# Patient Record
Sex: FEMALE | Race: WHITE | NOT HISPANIC OR LATINO | URBAN - METROPOLITAN AREA
[De-identification: names, ages, dates, MRNs, and addresses within clinical notes are randomized per-mention and may not be internally consistent; named-entity substitution may affect disease eponyms.]

---

## 2017-02-07 VITALS
RESPIRATION RATE: 16 BRPM | DIASTOLIC BLOOD PRESSURE: 62 MMHG | RESPIRATION RATE: 12 BRPM | HEART RATE: 79 BPM | DIASTOLIC BLOOD PRESSURE: 76 MMHG | RESPIRATION RATE: 21 BRPM | DIASTOLIC BLOOD PRESSURE: 81 MMHG | HEART RATE: 83 BPM | TEMPERATURE: 97.1 F | SYSTOLIC BLOOD PRESSURE: 134 MMHG | HEART RATE: 64 BPM | HEIGHT: 64 IN | DIASTOLIC BLOOD PRESSURE: 68 MMHG | RESPIRATION RATE: 22 BRPM | SYSTOLIC BLOOD PRESSURE: 124 MMHG | OXYGEN SATURATION: 97 % | OXYGEN SATURATION: 99 % | TEMPERATURE: 97.5 F | WEIGHT: 121 LBS | DIASTOLIC BLOOD PRESSURE: 63 MMHG | HEART RATE: 99 BPM | RESPIRATION RATE: 18 BRPM | DIASTOLIC BLOOD PRESSURE: 74 MMHG | SYSTOLIC BLOOD PRESSURE: 122 MMHG | HEART RATE: 68 BPM | HEART RATE: 65 BPM | SYSTOLIC BLOOD PRESSURE: 132 MMHG | SYSTOLIC BLOOD PRESSURE: 123 MMHG | HEART RATE: 69 BPM | HEART RATE: 66 BPM | SYSTOLIC BLOOD PRESSURE: 125 MMHG | RESPIRATION RATE: 20 BRPM | OXYGEN SATURATION: 100 %

## 2017-02-08 ENCOUNTER — OFFICE (AMBULATORY)
Dept: URBAN - METROPOLITAN AREA CLINIC 64 | Facility: CLINIC | Age: 82
End: 2017-02-08
Payer: MEDICARE

## 2017-02-08 ENCOUNTER — AMBULATORY SURGICAL CENTER (AMBULATORY)
Dept: URBAN - METROPOLITAN AREA SURGERY 17 | Facility: SURGERY | Age: 82
End: 2017-02-08
Payer: MEDICARE

## 2017-02-08 DIAGNOSIS — R13.10 DYSPHAGIA, UNSPECIFIED: ICD-10-CM

## 2017-02-08 DIAGNOSIS — K29.70 GASTRITIS, UNSPECIFIED, WITHOUT BLEEDING: ICD-10-CM

## 2017-02-08 DIAGNOSIS — K21.9 GASTRO-ESOPHAGEAL REFLUX DISEASE WITHOUT ESOPHAGITIS: ICD-10-CM

## 2017-02-08 DIAGNOSIS — K44.9 DIAPHRAGMATIC HERNIA WITHOUT OBSTRUCTION OR GANGRENE: ICD-10-CM

## 2017-02-08 LAB
GI HISTOLOGY: A. UNSPECIFIED: (no result)
GI HISTOLOGY: B. SELECT: (no result)
GI HISTOLOGY: C. UNSPECIFIED: (no result)
GI HISTOLOGY: D. UNSPECIFIED: (no result)
GI HISTOLOGY: PDF REPORT: (no result)

## 2017-02-08 PROCEDURE — 88305 TISSUE EXAM BY PATHOLOGIST: CPT | Performed by: INTERNAL MEDICINE

## 2017-02-08 PROCEDURE — 43239 EGD BIOPSY SINGLE/MULTIPLE: CPT | Performed by: INTERNAL MEDICINE

## 2017-02-08 RX ADMIN — PROPOFOL 50 MG: 10 INJECTION, EMULSION INTRAVENOUS at 10:15

## 2017-02-08 RX ADMIN — PROPOFOL 50 MG: 10 INJECTION, EMULSION INTRAVENOUS at 10:21

## 2017-02-08 RX ADMIN — LIDOCAINE HYDROCHLORIDE 50 MG: 10 INJECTION, SOLUTION EPIDURAL; INFILTRATION; INTRACAUDAL; PERINEURAL at 10:14

## 2017-02-08 RX ADMIN — PROPOFOL 50 MG: 10 INJECTION, EMULSION INTRAVENOUS at 10:19

## 2017-06-21 ENCOUNTER — TELEPHONE (OUTPATIENT)
Dept: CARDIOLOGY | Facility: CLINIC | Age: 82
End: 2017-06-21

## 2017-06-22 NOTE — TELEPHONE ENCOUNTER
I actually called and spoke with her.  The pain is exclusively with turning her head and positional changes. She has had no chest pain, SOA, or syncope.  She exerts herself without any issues.  I told her this is very likely musculoskeletal and not cardiac.  She will call back with any issues.  Thanks     JAYDEN

## 2017-10-04 ENCOUNTER — HOSPITAL ENCOUNTER (OUTPATIENT)
Facility: HOSPITAL | Age: 82
Setting detail: HOSPITAL OUTPATIENT SURGERY
End: 2017-10-04
Attending: PLASTIC SURGERY | Admitting: PLASTIC SURGERY

## 2017-10-18 ENCOUNTER — APPOINTMENT (OUTPATIENT)
Dept: PREADMISSION TESTING | Facility: HOSPITAL | Age: 82
End: 2017-10-18

## 2017-11-14 ENCOUNTER — APPOINTMENT (OUTPATIENT)
Dept: PREADMISSION TESTING | Facility: HOSPITAL | Age: 82
End: 2017-11-14

## 2017-11-14 VITALS
SYSTOLIC BLOOD PRESSURE: 121 MMHG | HEART RATE: 62 BPM | DIASTOLIC BLOOD PRESSURE: 64 MMHG | HEIGHT: 64 IN | RESPIRATION RATE: 16 BRPM | TEMPERATURE: 97.9 F | WEIGHT: 118 LBS | OXYGEN SATURATION: 96 % | BODY MASS INDEX: 20.14 KG/M2

## 2017-11-14 LAB
ANION GAP SERPL CALCULATED.3IONS-SCNC: 13.8 MMOL/L
BUN BLD-MCNC: 11 MG/DL (ref 8–23)
BUN/CREAT SERPL: 14.3 (ref 7–25)
CALCIUM SPEC-SCNC: 9.2 MG/DL (ref 8.6–10.5)
CHLORIDE SERPL-SCNC: 103 MMOL/L (ref 98–107)
CO2 SERPL-SCNC: 25.2 MMOL/L (ref 22–29)
CREAT BLD-MCNC: 0.77 MG/DL (ref 0.57–1)
DEPRECATED RDW RBC AUTO: 43.9 FL (ref 37–54)
ERYTHROCYTE [DISTWIDTH] IN BLOOD BY AUTOMATED COUNT: 13.3 % (ref 11.7–13)
GFR SERPL CREATININE-BSD FRML MDRD: 72 ML/MIN/1.73
GLUCOSE BLD-MCNC: 97 MG/DL (ref 65–99)
HCT VFR BLD AUTO: 37.1 % (ref 35.6–45.5)
HGB BLD-MCNC: 12.3 G/DL (ref 11.9–15.5)
MCH RBC QN AUTO: 30.1 PG (ref 26.9–32)
MCHC RBC AUTO-ENTMCNC: 33.2 G/DL (ref 32.4–36.3)
MCV RBC AUTO: 90.9 FL (ref 80.5–98.2)
PLATELET # BLD AUTO: 231 10*3/MM3 (ref 140–500)
PMV BLD AUTO: 8.4 FL (ref 6–12)
POTASSIUM BLD-SCNC: 3.3 MMOL/L (ref 3.5–5.2)
RBC # BLD AUTO: 4.08 10*6/MM3 (ref 3.9–5.2)
SODIUM BLD-SCNC: 142 MMOL/L (ref 136–145)
WBC NRBC COR # BLD: 6 10*3/MM3 (ref 4.5–10.7)

## 2017-11-14 PROCEDURE — 85027 COMPLETE CBC AUTOMATED: CPT | Performed by: PLASTIC SURGERY

## 2017-11-14 PROCEDURE — 36415 COLL VENOUS BLD VENIPUNCTURE: CPT

## 2017-11-14 PROCEDURE — 93010 ELECTROCARDIOGRAM REPORT: CPT | Performed by: INTERNAL MEDICINE

## 2017-11-14 PROCEDURE — 80048 BASIC METABOLIC PNL TOTAL CA: CPT | Performed by: PLASTIC SURGERY

## 2017-11-14 PROCEDURE — 93005 ELECTROCARDIOGRAM TRACING: CPT

## 2017-11-14 RX ORDER — FLUTICASONE PROPIONATE 50 MCG
2 SPRAY, SUSPENSION (ML) NASAL DAILY PRN
Status: ON HOLD | COMMUNITY
Start: 2017-11-02 | End: 2018-10-30

## 2017-11-28 ENCOUNTER — ANESTHESIA EVENT (OUTPATIENT)
Dept: PERIOP | Facility: HOSPITAL | Age: 82
End: 2017-11-28

## 2017-11-28 ENCOUNTER — ANESTHESIA (OUTPATIENT)
Dept: PERIOP | Facility: HOSPITAL | Age: 82
End: 2017-11-28

## 2017-11-28 ENCOUNTER — HOSPITAL ENCOUNTER (OUTPATIENT)
Facility: HOSPITAL | Age: 82
Setting detail: HOSPITAL OUTPATIENT SURGERY
Discharge: HOME | End: 2017-11-28
Attending: PLASTIC SURGERY | Admitting: PLASTIC SURGERY

## 2017-11-28 VITALS
DIASTOLIC BLOOD PRESSURE: 97 MMHG | RESPIRATION RATE: 16 BRPM | HEART RATE: 63 BPM | SYSTOLIC BLOOD PRESSURE: 120 MMHG | OXYGEN SATURATION: 100 % | TEMPERATURE: 98.1 F

## 2017-11-28 DIAGNOSIS — L98.9 SKIN LESION: ICD-10-CM

## 2017-11-28 PROCEDURE — 25010000002 PHENYLEPHRINE PER 1 ML: Performed by: NURSE ANESTHETIST, CERTIFIED REGISTERED

## 2017-11-28 PROCEDURE — 25010000002 PROPOFOL 10 MG/ML EMULSION: Performed by: NURSE ANESTHETIST, CERTIFIED REGISTERED

## 2017-11-28 PROCEDURE — 88305 TISSUE EXAM BY PATHOLOGIST: CPT | Performed by: PLASTIC SURGERY

## 2017-11-28 PROCEDURE — 25010000002 MIDAZOLAM PER 1 MG: Performed by: ANESTHESIOLOGY

## 2017-11-28 PROCEDURE — 25010000002 ONDANSETRON PER 1 MG: Performed by: ANESTHESIOLOGY

## 2017-11-28 PROCEDURE — 25010000002 FENTANYL CITRATE (PF) 100 MCG/2ML SOLUTION: Performed by: NURSE ANESTHETIST, CERTIFIED REGISTERED

## 2017-11-28 PROCEDURE — 88331 PATH CONSLTJ SURG 1 BLK 1SPC: CPT | Performed by: PLASTIC SURGERY

## 2017-11-28 RX ORDER — ONDANSETRON 2 MG/ML
INJECTION INTRAMUSCULAR; INTRAVENOUS AS NEEDED
Status: DISCONTINUED | OUTPATIENT
Start: 2017-11-28 | End: 2017-11-28 | Stop reason: SURG

## 2017-11-28 RX ORDER — SODIUM CHLORIDE 0.9 % (FLUSH) 0.9 %
1-10 SYRINGE (ML) INJECTION AS NEEDED
Status: DISCONTINUED | OUTPATIENT
Start: 2017-11-28 | End: 2017-11-28 | Stop reason: HOSPADM

## 2017-11-28 RX ORDER — BACITRACIN ZINC 500 [USP'U]/G
OINTMENT TOPICAL AS NEEDED
Status: DISCONTINUED | OUTPATIENT
Start: 2017-11-28 | End: 2017-11-28 | Stop reason: HOSPADM

## 2017-11-28 RX ORDER — PROPOFOL 10 MG/ML
VIAL (ML) INTRAVENOUS AS NEEDED
Status: DISCONTINUED | OUTPATIENT
Start: 2017-11-28 | End: 2017-11-28 | Stop reason: SURG

## 2017-11-28 RX ORDER — LIDOCAINE HYDROCHLORIDE 20 MG/ML
INJECTION, SOLUTION INFILTRATION; PERINEURAL AS NEEDED
Status: DISCONTINUED | OUTPATIENT
Start: 2017-11-28 | End: 2017-11-28 | Stop reason: SURG

## 2017-11-28 RX ORDER — OXYCODONE HYDROCHLORIDE AND ACETAMINOPHEN 5; 325 MG/1; MG/1
1 TABLET ORAL ONCE AS NEEDED
Status: CANCELLED | OUTPATIENT
Start: 2017-11-28 | End: 2017-12-08

## 2017-11-28 RX ORDER — HYDROCODONE BITARTRATE AND ACETAMINOPHEN 5; 325 MG/1; MG/1
1 TABLET ORAL EVERY 4 HOURS PRN
Qty: 12 TABLET | Refills: 0 | Status: ON HOLD | OUTPATIENT
Start: 2017-11-28 | End: 2018-10-30

## 2017-11-28 RX ORDER — NALBUPHINE HCL 10 MG/ML
2 AMPUL (ML) INJECTION EVERY 4 HOURS PRN
Status: CANCELLED | OUTPATIENT
Start: 2017-11-28

## 2017-11-28 RX ORDER — LIDOCAINE HYDROCHLORIDE 10 MG/ML
0.5 INJECTION, SOLUTION EPIDURAL; INFILTRATION; INTRACAUDAL; PERINEURAL ONCE AS NEEDED
Status: DISCONTINUED | OUTPATIENT
Start: 2017-11-28 | End: 2017-11-28 | Stop reason: HOSPADM

## 2017-11-28 RX ORDER — PROMETHAZINE HYDROCHLORIDE 25 MG/ML
6.25 INJECTION, SOLUTION INTRAMUSCULAR; INTRAVENOUS ONCE AS NEEDED
Status: CANCELLED | OUTPATIENT
Start: 2017-11-28

## 2017-11-28 RX ORDER — SODIUM CHLORIDE, SODIUM LACTATE, POTASSIUM CHLORIDE, CALCIUM CHLORIDE 600; 310; 30; 20 MG/100ML; MG/100ML; MG/100ML; MG/100ML
9 INJECTION, SOLUTION INTRAVENOUS CONTINUOUS
Status: DISCONTINUED | OUTPATIENT
Start: 2017-11-28 | End: 2017-11-28 | Stop reason: HOSPADM

## 2017-11-28 RX ORDER — ACETAMINOPHEN 325 MG/1
650 TABLET ORAL ONCE
Status: COMPLETED | OUTPATIENT
Start: 2017-11-28 | End: 2017-11-28

## 2017-11-28 RX ORDER — DIPHENHYDRAMINE HYDROCHLORIDE 50 MG/ML
12.5 INJECTION INTRAMUSCULAR; INTRAVENOUS
Status: CANCELLED | OUTPATIENT
Start: 2017-11-28

## 2017-11-28 RX ORDER — MAGNESIUM HYDROXIDE 1200 MG/15ML
LIQUID ORAL AS NEEDED
Status: DISCONTINUED | OUTPATIENT
Start: 2017-11-28 | End: 2017-11-28 | Stop reason: HOSPADM

## 2017-11-28 RX ORDER — NALBUPHINE HCL 10 MG/ML
10 AMPUL (ML) INJECTION EVERY 4 HOURS PRN
Status: CANCELLED | OUTPATIENT
Start: 2017-11-28

## 2017-11-28 RX ORDER — ACETAMINOPHEN 650 MG/1
650 SUPPOSITORY RECTAL ONCE AS NEEDED
Status: CANCELLED | OUTPATIENT
Start: 2017-11-28

## 2017-11-28 RX ORDER — FAMOTIDINE 10 MG/ML
20 INJECTION, SOLUTION INTRAVENOUS ONCE
Status: COMPLETED | OUTPATIENT
Start: 2017-11-28 | End: 2017-11-28

## 2017-11-28 RX ORDER — MIDAZOLAM HYDROCHLORIDE 1 MG/ML
1 INJECTION INTRAMUSCULAR; INTRAVENOUS
Status: DISCONTINUED | OUTPATIENT
Start: 2017-11-28 | End: 2017-11-28 | Stop reason: HOSPADM

## 2017-11-28 RX ORDER — PROMETHAZINE HYDROCHLORIDE 25 MG/1
25 SUPPOSITORY RECTAL ONCE AS NEEDED
Status: CANCELLED | OUTPATIENT
Start: 2017-11-28

## 2017-11-28 RX ORDER — NALOXONE HCL 0.4 MG/ML
0.4 VIAL (ML) INJECTION AS NEEDED
Status: CANCELLED | OUTPATIENT
Start: 2017-11-28

## 2017-11-28 RX ORDER — PROMETHAZINE HYDROCHLORIDE 25 MG/1
25 TABLET ORAL ONCE AS NEEDED
Status: CANCELLED | OUTPATIENT
Start: 2017-11-28

## 2017-11-28 RX ORDER — MIDAZOLAM HYDROCHLORIDE 1 MG/ML
2 INJECTION INTRAMUSCULAR; INTRAVENOUS
Status: DISCONTINUED | OUTPATIENT
Start: 2017-11-28 | End: 2017-11-28 | Stop reason: HOSPADM

## 2017-11-28 RX ORDER — PROPOFOL 10 MG/ML
VIAL (ML) INTRAVENOUS CONTINUOUS PRN
Status: DISCONTINUED | OUTPATIENT
Start: 2017-11-28 | End: 2017-11-28 | Stop reason: SURG

## 2017-11-28 RX ORDER — FENTANYL CITRATE 50 UG/ML
50 INJECTION, SOLUTION INTRAMUSCULAR; INTRAVENOUS
Status: CANCELLED | OUTPATIENT
Start: 2017-11-28

## 2017-11-28 RX ORDER — FENTANYL CITRATE 50 UG/ML
INJECTION, SOLUTION INTRAMUSCULAR; INTRAVENOUS AS NEEDED
Status: DISCONTINUED | OUTPATIENT
Start: 2017-11-28 | End: 2017-11-28 | Stop reason: SURG

## 2017-11-28 RX ORDER — HYDROMORPHONE HYDROCHLORIDE 1 MG/ML
0.5 INJECTION, SOLUTION INTRAMUSCULAR; INTRAVENOUS; SUBCUTANEOUS
Status: CANCELLED | OUTPATIENT
Start: 2017-11-28 | End: 2017-11-29

## 2017-11-28 RX ORDER — ACETAMINOPHEN 325 MG/1
650 TABLET ORAL ONCE AS NEEDED
Status: CANCELLED | OUTPATIENT
Start: 2017-11-28

## 2017-11-28 RX ORDER — HYDRALAZINE HYDROCHLORIDE 20 MG/ML
5 INJECTION INTRAMUSCULAR; INTRAVENOUS
Status: CANCELLED | OUTPATIENT
Start: 2017-11-28

## 2017-11-28 RX ADMIN — ONDANSETRON 4 MG: 2 INJECTION INTRAMUSCULAR; INTRAVENOUS at 15:26

## 2017-11-28 RX ADMIN — ACETAMINOPHEN 650 MG: 325 TABLET ORAL at 13:31

## 2017-11-28 RX ADMIN — PROPOFOL 100 MCG/KG/MIN: 10 INJECTION, EMULSION INTRAVENOUS at 14:40

## 2017-11-28 RX ADMIN — SODIUM CHLORIDE, POTASSIUM CHLORIDE, SODIUM LACTATE AND CALCIUM CHLORIDE 9 ML/HR: 600; 310; 30; 20 INJECTION, SOLUTION INTRAVENOUS at 13:31

## 2017-11-28 RX ADMIN — PHENYLEPHRINE HYDROCHLORIDE 50 MCG: 10 INJECTION INTRAVENOUS at 15:00

## 2017-11-28 RX ADMIN — MIDAZOLAM 1 MG: 1 INJECTION INTRAMUSCULAR; INTRAVENOUS at 13:32

## 2017-11-28 RX ADMIN — LIDOCAINE HYDROCHLORIDE 60 MG: 20 INJECTION, SOLUTION INFILTRATION; PERINEURAL at 14:40

## 2017-11-28 RX ADMIN — PHENYLEPHRINE HYDROCHLORIDE 50 MCG: 10 INJECTION INTRAVENOUS at 14:56

## 2017-11-28 RX ADMIN — FAMOTIDINE 20 MG: 10 INJECTION, SOLUTION INTRAVENOUS at 13:31

## 2017-11-28 RX ADMIN — PROPOFOL 50 MG: 10 INJECTION, EMULSION INTRAVENOUS at 14:40

## 2017-11-28 RX ADMIN — FENTANYL CITRATE 25 MCG: 50 INJECTION INTRAMUSCULAR; INTRAVENOUS at 14:40

## 2017-11-28 NOTE — ANESTHESIA PREPROCEDURE EVALUATION
Anesthesia Evaluation     NPO Solid Status: > 8 hours       Airway   Mallampati: III  TM distance: >3 FB  Neck ROM: full  possible difficult intubation  Dental - normal exam     Pulmonary - negative pulmonary ROS and normal exam   Cardiovascular     ECG reviewed  Rhythm: regular    (+) valvular problems/murmurs AS, murmur,     ROS comment: Mod/severe AS.  No hx of syncope or CP    Perfusion scan wnl    Neuro/Psych- negative ROS  GI/Hepatic/Renal/Endo - negative ROS     Musculoskeletal     Abdominal  - normal exam   Substance History      OB/GYN          Other                                      Anesthesia Plan    ASA 3     MAC   (Pt understands high probability of recall.  Will need to be awake without supplemental O2 for surgical portion.    Spoke w Dr. CHRISTIANSON, she desires deeper sedation and will inform us of any cautery use. She is aware of concerns of airway fire and O2)

## 2017-11-28 NOTE — ANESTHESIA POSTPROCEDURE EVALUATION
Patient: Tequila Vargas    Procedure Summary     Date Anesthesia Start Anesthesia Stop Room / Location    11/28/17 1436 1533  [facility] /  [facility]       Procedure Diagnosis Surgeon Provider    EXCISION ACTINIC KERATOSIS NASAL TIP (N/A ) No diagnosis on file. Maurine Waterhouse, MD Edwin Ray Render, MD          Anesthesia Type: MAC  Last vitals  BP   120/97 (11/28/17 1600)   Temp   36.7 °C (98.1 °F) (11/28/17 1531)   Pulse   63 (11/28/17 1600)   Resp   16 (11/28/17 1540)     SpO2   100 % (11/28/17 1600)     Post Anesthesia Care and Evaluation    Patient location during evaluation: bedside  Patient participation: complete - patient participated  Level of consciousness: awake and alert  Pain management: adequate  Airway patency: patent  Anesthetic complications: No anesthetic complications    Cardiovascular status: acceptable  Respiratory status: acceptable  Hydration status: acceptable    Comments: /97  Pulse 63  Temp 36.7 °C (98.1 °F) (Oral)   Resp 16  SpO2 100%  Pt seen in prop room where recovering prior to discharge.   Sign out done from enodo suite where I was on a computer

## 2017-11-30 LAB
LAB AP CASE REPORT: NORMAL
LAB AP CLINICAL INFORMATION: NORMAL
Lab: NORMAL
Lab: NORMAL
PATH REPORT.FINAL DX SPEC: NORMAL
PATH REPORT.GROSS SPEC: NORMAL

## 2018-10-16 ENCOUNTER — TELEPHONE (OUTPATIENT)
Dept: CARDIOLOGY | Facility: CLINIC | Age: 83
End: 2018-10-16

## 2018-10-16 DIAGNOSIS — I35.0 NONRHEUMATIC AORTIC VALVE STENOSIS: Primary | ICD-10-CM

## 2018-10-16 DIAGNOSIS — R06.02 SHORTNESS OF BREATH: ICD-10-CM

## 2018-10-16 NOTE — TELEPHONE ENCOUNTER
10/16/18   Pt called she is having increase Soa. She states she can just be standing and feels like she can't catch her breath. She states it has been going on for about a 1 month.   She is having chest tightness/pain and no swelling and some dizziness but thinks that from her Ear problems.   She wanted to know if you want to see in office or  order testing?    Pt's call back # [phone]    Thanks Jonh ACUÑA

## 2018-10-16 NOTE — TELEPHONE ENCOUNTER
I spoke with pt and she is aware no driving and Schedulers from Office will call her with Echo time for tomorrow.   Jonh ACUÑA

## 2018-10-16 NOTE — TELEPHONE ENCOUNTER
Jonh,    Per our conversation earlier, she is scheduled for an echo tomorrow.  She should not drive for now.  I am suspicious for significant aortic stenosis.      GM

## 2018-10-17 ENCOUNTER — HOSPITAL ENCOUNTER (OUTPATIENT)
Dept: CARDIOLOGY | Facility: HOSPITAL | Age: 83
Discharge: HOME | End: 2018-10-17
Attending: INTERNAL MEDICINE | Admitting: INTERNAL MEDICINE

## 2018-10-17 VITALS
SYSTOLIC BLOOD PRESSURE: 100 MMHG | BODY MASS INDEX: 20.14 KG/M2 | DIASTOLIC BLOOD PRESSURE: 70 MMHG | HEIGHT: 64 IN | HEART RATE: 110 BPM | WEIGHT: 118 LBS

## 2018-10-17 DIAGNOSIS — I35.0 NONRHEUMATIC AORTIC VALVE STENOSIS: ICD-10-CM

## 2018-10-17 DIAGNOSIS — R06.02 SHORTNESS OF BREATH: ICD-10-CM

## 2018-10-17 LAB
AORTIC DIMENSIONLESS INDEX: 0.2 (DI)
ASCENDING AORTA: 2.9 CM
BH CV ECHO MEAS - ACS: 0.8 CM
BH CV ECHO MEAS - AO MAX PG (FULL): 35.4 MMHG
BH CV ECHO MEAS - AO MAX PG: 37 MMHG
BH CV ECHO MEAS - AO MEAN PG (FULL): 26.8 MMHG
BH CV ECHO MEAS - AO MEAN PG: 27 MMHG
BH CV ECHO MEAS - AO ROOT AREA (BSA CORRECTED): 2
BH CV ECHO MEAS - AO ROOT AREA: 7.9 CM^2
BH CV ECHO MEAS - AO ROOT DIAM: 3.2 CM
BH CV ECHO MEAS - AO V2 MAX: 302.7 CM/SEC
BH CV ECHO MEAS - AO V2 MEAN: 258.3 CM/SEC
BH CV ECHO MEAS - AO V2 VTI: 71.9 CM
BH CV ECHO MEAS - AVA(I,A): 0.51 CM^2
BH CV ECHO MEAS - AVA(I,D): 0.51 CM^2
BH CV ECHO MEAS - AVA(V,A): 0.57 CM^2
BH CV ECHO MEAS - AVA(V,D): 0.57 CM^2
BH CV ECHO MEAS - BSA(HAYCOCK): 1.6 M^2
BH CV ECHO MEAS - BSA: 1.6 M^2
BH CV ECHO MEAS - BZI_BMI: 20.3 KILOGRAMS/M^2
BH CV ECHO MEAS - BZI_METRIC_HEIGHT: 162.6 CM
BH CV ECHO MEAS - BZI_METRIC_WEIGHT: 53.5 KG
BH CV ECHO MEAS - EDV(MOD-SP2): 56 ML
BH CV ECHO MEAS - EDV(MOD-SP4): 49 ML
BH CV ECHO MEAS - EDV(TEICH): 57.6 ML
BH CV ECHO MEAS - EF(CUBED): 64.4 %
BH CV ECHO MEAS - EF(MOD-BP): 53 %
BH CV ECHO MEAS - EF(MOD-SP2): 51.8 %
BH CV ECHO MEAS - EF(MOD-SP4): 55.1 %
BH CV ECHO MEAS - EF(TEICH): 56.8 %
BH CV ECHO MEAS - ESV(MOD-SP2): 27 ML
BH CV ECHO MEAS - ESV(MOD-SP4): 22 ML
BH CV ECHO MEAS - ESV(TEICH): 24.9 ML
BH CV ECHO MEAS - FS: 29.1 %
BH CV ECHO MEAS - IVS/LVPW: 1.2
BH CV ECHO MEAS - IVSD: 1.1 CM
BH CV ECHO MEAS - LAT PEAK E' VEL: 8 CM/SEC
BH CV ECHO MEAS - LV DIASTOLIC VOL/BSA (35-75): 31.3 ML/M^2
BH CV ECHO MEAS - LV MASS(C)D: 115.2 GRAMS
BH CV ECHO MEAS - LV MASS(C)DI: 73.7 GRAMS/M^2
BH CV ECHO MEAS - LV MAX PG: 1.3 MMHG
BH CV ECHO MEAS - LV MEAN PG: 0.7 MMHG
BH CV ECHO MEAS - LV SYSTOLIC VOL/BSA (12-30): 14.1 ML/M^2
BH CV ECHO MEAS - LV V1 MAX: 56.8 CM/SEC
BH CV ECHO MEAS - LV V1 MEAN: 39.1 CM/SEC
BH CV ECHO MEAS - LV V1 VTI: 12.1 CM
BH CV ECHO MEAS - LVIDD: 3.7 CM
BH CV ECHO MEAS - LVIDS: 2.6 CM
BH CV ECHO MEAS - LVLD AP2: 6.6 CM
BH CV ECHO MEAS - LVLD AP4: 6.5 CM
BH CV ECHO MEAS - LVLS AP2: 6.3 CM
BH CV ECHO MEAS - LVLS AP4: 5.5 CM
BH CV ECHO MEAS - LVOT AREA (M): 3.1 CM^2
BH CV ECHO MEAS - LVOT AREA: 3 CM^2
BH CV ECHO MEAS - LVOT DIAM: 2 CM
BH CV ECHO MEAS - LVPWD: 0.93 CM
BH CV ECHO MEAS - MED PEAK E' VEL: 8 CM/SEC
BH CV ECHO MEAS - MR MAX PG: 100.7 MMHG
BH CV ECHO MEAS - MR MAX VEL: 501.7 CM/SEC
BH CV ECHO MEAS - MV DEC SLOPE: 670.6 CM/SEC^2
BH CV ECHO MEAS - MV DEC TIME: 0.19 SEC
BH CV ECHO MEAS - MV E MAX VEL: 126 CM/SEC
BH CV ECHO MEAS - MV MAX PG: 8.2 MMHG
BH CV ECHO MEAS - MV MEAN PG: 2.3 MMHG
BH CV ECHO MEAS - MV P1/2T MAX VEL: 125.1 CM/SEC
BH CV ECHO MEAS - MV P1/2T: 54.7 MSEC
BH CV ECHO MEAS - MV V2 MAX: 143.3 CM/SEC
BH CV ECHO MEAS - MV V2 MEAN: 65.4 CM/SEC
BH CV ECHO MEAS - MV V2 VTI: 21.2 CM
BH CV ECHO MEAS - MVA P1/2T LCG: 1.8 CM^2
BH CV ECHO MEAS - MVA(P1/2T): 4 CM^2
BH CV ECHO MEAS - MVA(VTI): 1.7 CM^2
BH CV ECHO MEAS - PA ACC TIME: 0.11 SEC
BH CV ECHO MEAS - PA MAX PG (FULL): 2 MMHG
BH CV ECHO MEAS - PA MAX PG: 3.4 MMHG
BH CV ECHO MEAS - PA PR(ACCEL): 29.9 MMHG
BH CV ECHO MEAS - PA V2 MAX: 92.3 CM/SEC
BH CV ECHO MEAS - PVA(V,A): 1.7 CM^2
BH CV ECHO MEAS - PVA(V,D): 1.7 CM^2
BH CV ECHO MEAS - QP/QS: 0.91
BH CV ECHO MEAS - RAP SYSTOLE: 8 MMHG
BH CV ECHO MEAS - RV MAX PG: 1.4 MMHG
BH CV ECHO MEAS - RV MEAN PG: 0.86 MMHG
BH CV ECHO MEAS - RV V1 MAX: 59.2 CM/SEC
BH CV ECHO MEAS - RV V1 MEAN: 44.1 CM/SEC
BH CV ECHO MEAS - RV V1 VTI: 12.4 CM
BH CV ECHO MEAS - RVOT AREA: 2.7 CM^2
BH CV ECHO MEAS - RVOT DIAM: 1.9 CM
BH CV ECHO MEAS - RVSP: 43 MMHG
BH CV ECHO MEAS - SI(AO): 362.1 ML/M^2
BH CV ECHO MEAS - SI(CUBED): 20.6 ML/M^2
BH CV ECHO MEAS - SI(LVOT): 23.5 ML/M^2
BH CV ECHO MEAS - SI(MOD-SP2): 18.6 ML/M^2
BH CV ECHO MEAS - SI(MOD-SP4): 17.3 ML/M^2
BH CV ECHO MEAS - SI(TEICH): 20.9 ML/M^2
BH CV ECHO MEAS - SUP REN AO DIAM: 1.6 CM
BH CV ECHO MEAS - SV(AO): 566 ML
BH CV ECHO MEAS - SV(CUBED): 32.3 ML
BH CV ECHO MEAS - SV(LVOT): 36.8 ML
BH CV ECHO MEAS - SV(MOD-SP2): 29 ML
BH CV ECHO MEAS - SV(MOD-SP4): 27 ML
BH CV ECHO MEAS - SV(RVOT): 33.6 ML
BH CV ECHO MEAS - SV(TEICH): 32.7 ML
BH CV ECHO MEAS - TAPSE (>1.6): 1.4 CM2
BH CV ECHO MEAS - TR MAX VEL: 295.9 CM/SEC
BH CV ECHO MEASUREMENTS AVERAGE E/E' RATIO: 15.75
BH CV XLRA - RV BASE: 2.3 CM
BH CV XLRA - TDI S': 11 CM/SEC
LEFT ATRIUM VOLUME INDEX: 26 ML/M2
MAXIMAL PREDICTED HEART RATE: 139 BPM
SINUS: 2.8 CM
STJ: 2.8 CM
STRESS TARGET HR: 118 BPM
Z-SCORE OF LEFT VENTRICULAR DIMENSION IN END SYSTOLE: 5.3

## 2018-10-17 PROCEDURE — 93306 TTE W/DOPPLER COMPLETE: CPT

## 2018-10-17 PROCEDURE — 93306 TTE W/DOPPLER COMPLETE: CPT | Performed by: INTERNAL MEDICINE

## 2018-10-18 ENCOUNTER — APPOINTMENT (OUTPATIENT)
Dept: GENERAL RADIOLOGY | Facility: HOSPITAL | Age: 83
End: 2018-10-18
Attending: INTERNAL MEDICINE

## 2018-10-18 ENCOUNTER — HOSPITAL ENCOUNTER (INPATIENT)
Facility: HOSPITAL | Age: 83
LOS: 7 days | Discharge: HOME | End: 2018-10-25
Attending: INTERNAL MEDICINE | Admitting: INTERNAL MEDICINE

## 2018-10-18 DIAGNOSIS — I48.91 ATRIAL FIBRILLATION WITH RVR (HCC): ICD-10-CM

## 2018-10-18 DIAGNOSIS — I48.91 ATRIAL FIBRILLATION, RAPID (HCC): Primary | ICD-10-CM

## 2018-10-18 LAB
ALBUMIN SERPL-MCNC: 4.3 G/DL (ref 3.5–5.2)
ALBUMIN/GLOB SERPL: 1.5 G/DL
ALP SERPL-CCNC: 67 U/L (ref 39–117)
ALT SERPL W P-5'-P-CCNC: 20 U/L (ref 1–33)
ANION GAP SERPL CALCULATED.3IONS-SCNC: 15.9 MMOL/L
AST SERPL-CCNC: 30 U/L (ref 1–32)
BASOPHILS # BLD AUTO: 0.03 10*3/MM3 (ref 0–0.2)
BASOPHILS NFR BLD AUTO: 0.5 % (ref 0–1.5)
BILIRUB SERPL-MCNC: 0.6 MG/DL (ref 0.1–1.2)
BUN BLD-MCNC: 14 MG/DL (ref 8–23)
BUN/CREAT SERPL: 16.1 (ref 7–25)
CALCIUM SPEC-SCNC: 9.4 MG/DL (ref 8.6–10.5)
CHLORIDE SERPL-SCNC: 102 MMOL/L (ref 98–107)
CO2 SERPL-SCNC: 23.1 MMOL/L (ref 22–29)
CREAT BLD-MCNC: 0.87 MG/DL (ref 0.57–1)
DEPRECATED RDW RBC AUTO: 45.8 FL (ref 37–54)
EOSINOPHIL # BLD AUTO: 0.11 10*3/MM3 (ref 0–0.7)
EOSINOPHIL NFR BLD AUTO: 1.8 % (ref 0.3–6.2)
ERYTHROCYTE [DISTWIDTH] IN BLOOD BY AUTOMATED COUNT: 13.7 % (ref 11.7–13)
GFR SERPL CREATININE-BSD FRML MDRD: 62 ML/MIN/1.73
GLOBULIN UR ELPH-MCNC: 2.8 GM/DL
GLUCOSE BLD-MCNC: 101 MG/DL (ref 65–99)
HCT VFR BLD AUTO: 42.3 % (ref 35.6–45.5)
HGB BLD-MCNC: 13.3 G/DL (ref 11.9–15.5)
IMM GRANULOCYTES # BLD: 0 10*3/MM3 (ref 0–0.03)
IMM GRANULOCYTES NFR BLD: 0 % (ref 0–0.5)
LYMPHOCYTES # BLD AUTO: 1.49 10*3/MM3 (ref 0.9–4.8)
LYMPHOCYTES NFR BLD AUTO: 25 % (ref 19.6–45.3)
MAGNESIUM SERPL-MCNC: 2.1 MG/DL (ref 1.6–2.4)
MCH RBC QN AUTO: 29 PG (ref 26.9–32)
MCHC RBC AUTO-ENTMCNC: 31.4 G/DL (ref 32.4–36.3)
MCV RBC AUTO: 92.4 FL (ref 80.5–98.2)
MONOCYTES # BLD AUTO: 0.48 10*3/MM3 (ref 0.2–1.2)
MONOCYTES NFR BLD AUTO: 8.1 % (ref 5–12)
NEUTROPHILS # BLD AUTO: 3.85 10*3/MM3 (ref 1.9–8.1)
NEUTROPHILS NFR BLD AUTO: 64.6 % (ref 42.7–76)
PLATELET # BLD AUTO: 215 10*3/MM3 (ref 140–500)
PMV BLD AUTO: 8.6 FL (ref 6–12)
POTASSIUM BLD-SCNC: 4.1 MMOL/L (ref 3.5–5.2)
PROT SERPL-MCNC: 7.1 G/DL (ref 6–8.5)
RBC # BLD AUTO: 4.58 10*6/MM3 (ref 3.9–5.2)
SODIUM BLD-SCNC: 141 MMOL/L (ref 136–145)
T4 FREE SERPL-MCNC: 1.52 NG/DL (ref 0.93–1.7)
TROPONIN T SERPL-MCNC: <0.01 NG/ML (ref 0–0.03)
TSH SERPL DL<=0.05 MIU/L-ACNC: 2.22 MIU/ML (ref 0.27–4.2)
WBC NRBC COR # BLD: 5.96 10*3/MM3 (ref 4.5–10.7)

## 2018-10-18 PROCEDURE — 99223 1ST HOSP IP/OBS HIGH 75: CPT | Performed by: INTERNAL MEDICINE

## 2018-10-18 PROCEDURE — 84484 ASSAY OF TROPONIN QUANT: CPT | Performed by: INTERNAL MEDICINE

## 2018-10-18 PROCEDURE — 93010 ELECTROCARDIOGRAM REPORT: CPT | Performed by: INTERNAL MEDICINE

## 2018-10-18 PROCEDURE — 84439 ASSAY OF FREE THYROXINE: CPT | Performed by: INTERNAL MEDICINE

## 2018-10-18 PROCEDURE — 25010000002 ENOXAPARIN PER 10 MG: Performed by: INTERNAL MEDICINE

## 2018-10-18 PROCEDURE — G0378 HOSPITAL OBSERVATION PER HR: HCPCS

## 2018-10-18 PROCEDURE — 85025 COMPLETE CBC W/AUTO DIFF WBC: CPT | Performed by: INTERNAL MEDICINE

## 2018-10-18 PROCEDURE — 71046 X-RAY EXAM CHEST 2 VIEWS: CPT

## 2018-10-18 PROCEDURE — 80053 COMPREHEN METABOLIC PANEL: CPT | Performed by: INTERNAL MEDICINE

## 2018-10-18 PROCEDURE — 84443 ASSAY THYROID STIM HORMONE: CPT | Performed by: INTERNAL MEDICINE

## 2018-10-18 PROCEDURE — 83735 ASSAY OF MAGNESIUM: CPT | Performed by: INTERNAL MEDICINE

## 2018-10-18 PROCEDURE — 93005 ELECTROCARDIOGRAM TRACING: CPT | Performed by: INTERNAL MEDICINE

## 2018-10-18 PROCEDURE — 25010000002 DIGOXIN PER 500 MCG: Performed by: INTERNAL MEDICINE

## 2018-10-18 RX ORDER — ONDANSETRON 2 MG/ML
4 INJECTION INTRAMUSCULAR; INTRAVENOUS EVERY 6 HOURS PRN
Status: DISCONTINUED | OUTPATIENT
Start: 2018-10-18 | End: 2018-10-25 | Stop reason: HOSPADM

## 2018-10-18 RX ORDER — SODIUM CHLORIDE 0.9 % (FLUSH) 0.9 %
3-10 SYRINGE (ML) INJECTION AS NEEDED
Status: DISCONTINUED | OUTPATIENT
Start: 2018-10-18 | End: 2018-10-25 | Stop reason: HOSPADM

## 2018-10-18 RX ORDER — DIGOXIN 0.25 MG/ML
500 INJECTION INTRAMUSCULAR; INTRAVENOUS ONCE
Status: COMPLETED | OUTPATIENT
Start: 2018-10-18 | End: 2018-10-18

## 2018-10-18 RX ORDER — TEMAZEPAM 7.5 MG/1
7.5 CAPSULE ORAL NIGHTLY PRN
Status: DISCONTINUED | OUTPATIENT
Start: 2018-10-18 | End: 2018-10-25 | Stop reason: HOSPADM

## 2018-10-18 RX ORDER — ALPRAZOLAM 0.25 MG/1
0.25 TABLET ORAL 4 TIMES DAILY PRN
Status: DISCONTINUED | OUTPATIENT
Start: 2018-10-18 | End: 2018-10-25 | Stop reason: HOSPADM

## 2018-10-18 RX ORDER — SODIUM CHLORIDE 0.9 % (FLUSH) 0.9 %
3 SYRINGE (ML) INJECTION EVERY 12 HOURS SCHEDULED
Status: DISCONTINUED | OUTPATIENT
Start: 2018-10-18 | End: 2018-10-25 | Stop reason: HOSPADM

## 2018-10-18 RX ADMIN — Medication 3 ML: at 21:55

## 2018-10-18 RX ADMIN — DIGOXIN 500 MCG: 0.25 INJECTION INTRAMUSCULAR; INTRAVENOUS at 15:09

## 2018-10-18 RX ADMIN — ENOXAPARIN SODIUM 50 MG: 100 INJECTION SUBCUTANEOUS at 23:43

## 2018-10-18 RX ADMIN — SODIUM CHLORIDE 5 MG/HR: 900 INJECTION, SOLUTION INTRAVENOUS at 15:09

## 2018-10-19 ENCOUNTER — APPOINTMENT (OUTPATIENT)
Dept: CARDIOLOGY | Facility: HOSPITAL | Age: 83
End: 2018-10-19
Attending: INTERNAL MEDICINE

## 2018-10-19 PROBLEM — I48.91 ATRIAL FIBRILLATION WITH RVR: Status: ACTIVE | Noted: 2018-10-19

## 2018-10-19 LAB
ANION GAP SERPL CALCULATED.3IONS-SCNC: 10.9 MMOL/L
AORTIC DIMENSIONLESS INDEX: 0.2 (DI)
BH CV ECHO MEAS - AI DEC SLOPE: 265 CM/SEC^2
BH CV ECHO MEAS - AI MAX PG: 27.5 MMHG
BH CV ECHO MEAS - AI MAX VEL: 262 CM/SEC
BH CV ECHO MEAS - AI P1/2T: 289.6 MSEC
BH CV ECHO MEAS - AO MAX PG: 91 MMHG
BH CV ECHO MEAS - AO MEAN PG (FULL): 44 MMHG
BH CV ECHO MEAS - AO MEAN PG: 45 MMHG
BH CV ECHO MEAS - AO ROOT AREA (BSA CORRECTED): 1.9
BH CV ECHO MEAS - AO ROOT AREA: 7.1 CM^2
BH CV ECHO MEAS - AO ROOT DIAM: 3 CM
BH CV ECHO MEAS - AO V2 MAX: 477 CM/SEC
BH CV ECHO MEAS - AO V2 MEAN: 317 CM/SEC
BH CV ECHO MEAS - AO V2 VTI: 97 CM
BH CV ECHO MEAS - AVA(I,A): 0.43 CM^2
BH CV ECHO MEAS - AVA(I,D): 0.43 CM^2
BH CV ECHO MEAS - BSA(HAYCOCK): 1.5 M^2
BH CV ECHO MEAS - BSA: 1.6 M^2
BH CV ECHO MEAS - BZI_BMI: 20.1 KILOGRAMS/M^2
BH CV ECHO MEAS - BZI_METRIC_HEIGHT: 162.6 CM
BH CV ECHO MEAS - BZI_METRIC_WEIGHT: 53.1 KG
BH CV ECHO MEAS - LV MEAN PG: 1 MMHG
BH CV ECHO MEAS - LV V1 MAX: 70 CM/SEC
BH CV ECHO MEAS - LV V1 MEAN: 54.8 CM/SEC
BH CV ECHO MEAS - LV V1 VTI: 16.5 CM
BH CV ECHO MEAS - LVOT AREA (M): 2.5 CM^2
BH CV ECHO MEAS - LVOT AREA: 2.5 CM^2
BH CV ECHO MEAS - LVOT DIAM: 1.8 CM
BH CV ECHO MEAS - SI(AO): 440.2 ML/M^2
BH CV ECHO MEAS - SI(LVOT): 27 ML/M^2
BH CV ECHO MEAS - SV(AO): 685.7 ML
BH CV ECHO MEAS - SV(LVOT): 42 ML
BH CV VAS BP LEFT ARM: NORMAL MMHG
BUN BLD-MCNC: 14 MG/DL (ref 8–23)
BUN/CREAT SERPL: 16.3 (ref 7–25)
CALCIUM SPEC-SCNC: 8.8 MG/DL (ref 8.6–10.5)
CHLORIDE SERPL-SCNC: 106 MMOL/L (ref 98–107)
CO2 SERPL-SCNC: 26.1 MMOL/L (ref 22–29)
CREAT BLD-MCNC: 0.86 MG/DL (ref 0.57–1)
DEPRECATED RDW RBC AUTO: 47.1 FL (ref 37–54)
ERYTHROCYTE [DISTWIDTH] IN BLOOD BY AUTOMATED COUNT: 13.8 % (ref 11.7–13)
GFR SERPL CREATININE-BSD FRML MDRD: 63 ML/MIN/1.73
GLUCOSE BLD-MCNC: 88 MG/DL (ref 65–99)
HCT VFR BLD AUTO: 37.5 % (ref 35.6–45.5)
HGB BLD-MCNC: 11.8 G/DL (ref 11.9–15.5)
MAXIMAL PREDICTED HEART RATE: 139 BPM
MCH RBC QN AUTO: 29.3 PG (ref 26.9–32)
MCHC RBC AUTO-ENTMCNC: 31.5 G/DL (ref 32.4–36.3)
MCV RBC AUTO: 93.1 FL (ref 80.5–98.2)
PLATELET # BLD AUTO: 189 10*3/MM3 (ref 140–500)
PMV BLD AUTO: 8.5 FL (ref 6–12)
POTASSIUM BLD-SCNC: 3.7 MMOL/L (ref 3.5–5.2)
RBC # BLD AUTO: 4.03 10*6/MM3 (ref 3.9–5.2)
SODIUM BLD-SCNC: 143 MMOL/L (ref 136–145)
STRESS TARGET HR: 118 BPM
WBC NRBC COR # BLD: 4.98 10*3/MM3 (ref 4.5–10.7)

## 2018-10-19 PROCEDURE — 93321 DOPPLER ECHO F-UP/LMTD STD: CPT | Performed by: INTERNAL MEDICINE

## 2018-10-19 PROCEDURE — 93308 TTE F-UP OR LMTD: CPT

## 2018-10-19 PROCEDURE — 93321 DOPPLER ECHO F-UP/LMTD STD: CPT

## 2018-10-19 PROCEDURE — 85027 COMPLETE CBC AUTOMATED: CPT | Performed by: INTERNAL MEDICINE

## 2018-10-19 PROCEDURE — 93308 TTE F-UP OR LMTD: CPT | Performed by: INTERNAL MEDICINE

## 2018-10-19 PROCEDURE — 93010 ELECTROCARDIOGRAM REPORT: CPT | Performed by: INTERNAL MEDICINE

## 2018-10-19 PROCEDURE — 25010000002 DIGOXIN PER 500 MCG: Performed by: INTERNAL MEDICINE

## 2018-10-19 PROCEDURE — 25010000002 ENOXAPARIN PER 10 MG: Performed by: INTERNAL MEDICINE

## 2018-10-19 PROCEDURE — 93325 DOPPLER ECHO COLOR FLOW MAPG: CPT

## 2018-10-19 PROCEDURE — 80048 BASIC METABOLIC PNL TOTAL CA: CPT | Performed by: INTERNAL MEDICINE

## 2018-10-19 PROCEDURE — 93325 DOPPLER ECHO COLOR FLOW MAPG: CPT | Performed by: INTERNAL MEDICINE

## 2018-10-19 PROCEDURE — 93005 ELECTROCARDIOGRAM TRACING: CPT | Performed by: INTERNAL MEDICINE

## 2018-10-19 RX ORDER — DIGOXIN 125 MCG
125 TABLET ORAL
Status: DISCONTINUED | OUTPATIENT
Start: 2018-10-20 | End: 2018-10-22

## 2018-10-19 RX ORDER — DIGOXIN 0.25 MG/ML
500 INJECTION INTRAMUSCULAR; INTRAVENOUS ONCE
Status: COMPLETED | OUTPATIENT
Start: 2018-10-19 | End: 2018-10-19

## 2018-10-19 RX ADMIN — ENOXAPARIN SODIUM 50 MG: 100 INJECTION SUBCUTANEOUS at 10:07

## 2018-10-19 RX ADMIN — SODIUM CHLORIDE 5 MG/HR: 900 INJECTION, SOLUTION INTRAVENOUS at 05:56

## 2018-10-19 RX ADMIN — DILTIAZEM HYDROCHLORIDE 30 MG: 30 TABLET, FILM COATED ORAL at 12:38

## 2018-10-19 RX ADMIN — DIGOXIN 500 MCG: 0.25 INJECTION INTRAMUSCULAR; INTRAVENOUS at 12:38

## 2018-10-19 RX ADMIN — DILTIAZEM HYDROCHLORIDE 30 MG: 30 TABLET, FILM COATED ORAL at 22:13

## 2018-10-19 RX ADMIN — Medication 3 ML: at 10:27

## 2018-10-19 RX ADMIN — ENOXAPARIN SODIUM 50 MG: 100 INJECTION SUBCUTANEOUS at 22:19

## 2018-10-20 ENCOUNTER — APPOINTMENT (OUTPATIENT)
Dept: CARDIOLOGY | Facility: HOSPITAL | Age: 83
End: 2018-10-20
Attending: INTERNAL MEDICINE

## 2018-10-20 LAB
ANION GAP SERPL CALCULATED.3IONS-SCNC: 12.3 MMOL/L
BH CV XLRA MEAS LEFT CCA RATIO VEL: -80.3 CM/SEC
BH CV XLRA MEAS LEFT DIST CCA EDV: -27.6 CM/SEC
BH CV XLRA MEAS LEFT DIST CCA PSV: -80.3 CM/SEC
BH CV XLRA MEAS LEFT DIST ICA EDV: -24.4 CM/SEC
BH CV XLRA MEAS LEFT DIST ICA PSV: -74 CM/SEC
BH CV XLRA MEAS LEFT ICA RATIO VEL: -88.5 CM/SEC
BH CV XLRA MEAS LEFT ICA/CCA RATIO: 1.1
BH CV XLRA MEAS LEFT MID ICA EDV: -33 CM/SEC
BH CV XLRA MEAS LEFT MID ICA PSV: -88.5 CM/SEC
BH CV XLRA MEAS LEFT PROX CCA EDV: 19.3 CM/SEC
BH CV XLRA MEAS LEFT PROX CCA PSV: 69.2 CM/SEC
BH CV XLRA MEAS LEFT PROX ECA EDV: -7 CM/SEC
BH CV XLRA MEAS LEFT PROX ECA PSV: -57.5 CM/SEC
BH CV XLRA MEAS LEFT PROX ICA EDV: -31.7 CM/SEC
BH CV XLRA MEAS LEFT PROX ICA PSV: -84.4 CM/SEC
BH CV XLRA MEAS LEFT PROX SCLA PSV: 91.1 CM/SEC
BH CV XLRA MEAS LEFT VERTEBRAL A EDV: 7.5 CM/SEC
BH CV XLRA MEAS LEFT VERTEBRAL A PSV: 47.5 CM/SEC
BH CV XLRA MEAS RIGHT CCA RATIO VEL: -75.6 CM/SEC
BH CV XLRA MEAS RIGHT DIST CCA EDV: -24.6 CM/SEC
BH CV XLRA MEAS RIGHT DIST CCA PSV: -75.6 CM/SEC
BH CV XLRA MEAS RIGHT DIST ICA EDV: -25.2 CM/SEC
BH CV XLRA MEAS RIGHT DIST ICA PSV: -72.9 CM/SEC
BH CV XLRA MEAS RIGHT ICA RATIO VEL: -94.9 CM/SEC
BH CV XLRA MEAS RIGHT ICA/CCA RATIO: 1.3
BH CV XLRA MEAS RIGHT MID ICA EDV: -24.6 CM/SEC
BH CV XLRA MEAS RIGHT MID ICA PSV: -94.9 CM/SEC
BH CV XLRA MEAS RIGHT PROX CCA EDV: 11.1 CM/SEC
BH CV XLRA MEAS RIGHT PROX CCA PSV: 96.2 CM/SEC
BH CV XLRA MEAS RIGHT PROX ECA EDV: -4.7 CM/SEC
BH CV XLRA MEAS RIGHT PROX ECA PSV: -53 CM/SEC
BH CV XLRA MEAS RIGHT PROX ICA EDV: -15.7 CM/SEC
BH CV XLRA MEAS RIGHT PROX ICA PSV: -52.6 CM/SEC
BH CV XLRA MEAS RIGHT PROX SCLA PSV: 110 CM/SEC
BH CV XLRA MEAS RIGHT VERTEBRAL A EDV: 13 CM/SEC
BH CV XLRA MEAS RIGHT VERTEBRAL A PSV: 44.8 CM/SEC
BUN BLD-MCNC: 14 MG/DL (ref 8–23)
BUN/CREAT SERPL: 23.3 (ref 7–25)
CALCIUM SPEC-SCNC: 8.7 MG/DL (ref 8.6–10.5)
CHLORIDE SERPL-SCNC: 106 MMOL/L (ref 98–107)
CO2 SERPL-SCNC: 21.7 MMOL/L (ref 22–29)
CREAT BLD-MCNC: 0.6 MG/DL (ref 0.57–1)
DEPRECATED RDW RBC AUTO: 46 FL (ref 37–54)
ERYTHROCYTE [DISTWIDTH] IN BLOOD BY AUTOMATED COUNT: 13.6 % (ref 11.7–13)
GFR SERPL CREATININE-BSD FRML MDRD: 96 ML/MIN/1.73
GLUCOSE BLD-MCNC: 91 MG/DL (ref 65–99)
HCT VFR BLD AUTO: 38.4 % (ref 35.6–45.5)
HGB BLD-MCNC: 12.1 G/DL (ref 11.9–15.5)
LEFT ARM BP: NORMAL MMHG
MCH RBC QN AUTO: 29.2 PG (ref 26.9–32)
MCHC RBC AUTO-ENTMCNC: 31.5 G/DL (ref 32.4–36.3)
MCV RBC AUTO: 92.8 FL (ref 80.5–98.2)
PLATELET # BLD AUTO: 188 10*3/MM3 (ref 140–500)
PMV BLD AUTO: 8.5 FL (ref 6–12)
POTASSIUM BLD-SCNC: 3.9 MMOL/L (ref 3.5–5.2)
RBC # BLD AUTO: 4.14 10*6/MM3 (ref 3.9–5.2)
RIGHT ARM BP: NORMAL MMHG
SODIUM BLD-SCNC: 140 MMOL/L (ref 136–145)
WBC NRBC COR # BLD: 4.57 10*3/MM3 (ref 4.5–10.7)

## 2018-10-20 PROCEDURE — 99222 1ST HOSP IP/OBS MODERATE 55: CPT | Performed by: INTERNAL MEDICINE

## 2018-10-20 PROCEDURE — 25010000002 ENOXAPARIN PER 10 MG: Performed by: INTERNAL MEDICINE

## 2018-10-20 PROCEDURE — 85027 COMPLETE CBC AUTOMATED: CPT | Performed by: INTERNAL MEDICINE

## 2018-10-20 PROCEDURE — 93010 ELECTROCARDIOGRAM REPORT: CPT | Performed by: INTERNAL MEDICINE

## 2018-10-20 PROCEDURE — 93005 ELECTROCARDIOGRAM TRACING: CPT | Performed by: INTERNAL MEDICINE

## 2018-10-20 PROCEDURE — 93880 EXTRACRANIAL BILAT STUDY: CPT

## 2018-10-20 PROCEDURE — 80048 BASIC METABOLIC PNL TOTAL CA: CPT | Performed by: INTERNAL MEDICINE

## 2018-10-20 RX ADMIN — DILTIAZEM HYDROCHLORIDE 30 MG: 30 TABLET, FILM COATED ORAL at 13:08

## 2018-10-20 RX ADMIN — DIGOXIN 125 MCG: 0.12 TABLET ORAL at 13:08

## 2018-10-20 RX ADMIN — Medication 3 ML: at 01:40

## 2018-10-20 RX ADMIN — DILTIAZEM HYDROCHLORIDE 30 MG: 30 TABLET, FILM COATED ORAL at 23:02

## 2018-10-20 RX ADMIN — ENOXAPARIN SODIUM 50 MG: 100 INJECTION SUBCUTANEOUS at 13:08

## 2018-10-20 RX ADMIN — ENOXAPARIN SODIUM 50 MG: 100 INJECTION SUBCUTANEOUS at 23:02

## 2018-10-20 RX ADMIN — DILTIAZEM HYDROCHLORIDE 30 MG: 30 TABLET, FILM COATED ORAL at 06:38

## 2018-10-20 RX ADMIN — Medication 3 ML: at 21:05

## 2018-10-21 PROCEDURE — 93460 R&L HRT ART/VENTRICLE ANGIO: CPT | Performed by: INTERNAL MEDICINE

## 2018-10-21 PROCEDURE — 25010000002 HEPARIN (PORCINE) PER 1000 UNITS: Performed by: INTERNAL MEDICINE

## 2018-10-21 PROCEDURE — 0 IOPAMIDOL PER 1 ML: Performed by: INTERNAL MEDICINE

## 2018-10-21 PROCEDURE — 99232 SBSQ HOSP IP/OBS MODERATE 35: CPT | Performed by: INTERNAL MEDICINE

## 2018-10-21 PROCEDURE — C1894 INTRO/SHEATH, NON-LASER: HCPCS | Performed by: INTERNAL MEDICINE

## 2018-10-21 PROCEDURE — 85014 HEMATOCRIT: CPT

## 2018-10-21 PROCEDURE — 25010000002 FENTANYL CITRATE (PF) 100 MCG/2ML SOLUTION: Performed by: INTERNAL MEDICINE

## 2018-10-21 PROCEDURE — 25010000002 MIDAZOLAM PER 1 MG: Performed by: INTERNAL MEDICINE

## 2018-10-21 PROCEDURE — C1769 GUIDE WIRE: HCPCS | Performed by: INTERNAL MEDICINE

## 2018-10-21 PROCEDURE — C1887 CATHETER, GUIDING: HCPCS | Performed by: INTERNAL MEDICINE

## 2018-10-21 PROCEDURE — 25010000002 ENOXAPARIN PER 10 MG: Performed by: INTERNAL MEDICINE

## 2018-10-21 PROCEDURE — 99152 MOD SED SAME PHYS/QHP 5/>YRS: CPT | Performed by: INTERNAL MEDICINE

## 2018-10-21 PROCEDURE — B2111ZZ FLUOROSCOPY OF MULTIPLE CORONARY ARTERIES USING LOW OSMOLAR CONTRAST: ICD-10-PCS | Performed by: INTERNAL MEDICINE

## 2018-10-21 PROCEDURE — 85018 HEMOGLOBIN: CPT

## 2018-10-21 PROCEDURE — B2161ZZ FLUOROSCOPY OF RIGHT AND LEFT HEART USING LOW OSMOLAR CONTRAST: ICD-10-PCS | Performed by: INTERNAL MEDICINE

## 2018-10-21 PROCEDURE — 4A023N8 MEASUREMENT OF CARDIAC SAMPLING AND PRESSURE, BILATERAL, PERCUTANEOUS APPROACH: ICD-10-PCS | Performed by: INTERNAL MEDICINE

## 2018-10-21 RX ORDER — SODIUM CHLORIDE 9 MG/ML
100 INJECTION, SOLUTION INTRAVENOUS CONTINUOUS
Status: ACTIVE | OUTPATIENT
Start: 2018-10-21 | End: 2018-10-21

## 2018-10-21 RX ORDER — FENTANYL CITRATE 50 UG/ML
INJECTION, SOLUTION INTRAMUSCULAR; INTRAVENOUS AS NEEDED
Status: DISCONTINUED | OUTPATIENT
Start: 2018-10-21 | End: 2018-10-21 | Stop reason: HOSPADM

## 2018-10-21 RX ORDER — SODIUM CHLORIDE 9 MG/ML
INJECTION, SOLUTION INTRAVENOUS CONTINUOUS PRN
Status: COMPLETED | OUTPATIENT
Start: 2018-10-21 | End: 2018-10-21

## 2018-10-21 RX ORDER — LIDOCAINE HYDROCHLORIDE 20 MG/ML
INJECTION, SOLUTION INFILTRATION; PERINEURAL AS NEEDED
Status: DISCONTINUED | OUTPATIENT
Start: 2018-10-21 | End: 2018-10-21 | Stop reason: HOSPADM

## 2018-10-21 RX ORDER — MIDAZOLAM HYDROCHLORIDE 1 MG/ML
INJECTION INTRAMUSCULAR; INTRAVENOUS AS NEEDED
Status: DISCONTINUED | OUTPATIENT
Start: 2018-10-21 | End: 2018-10-21 | Stop reason: HOSPADM

## 2018-10-21 RX ADMIN — Medication 3 ML: at 20:05

## 2018-10-21 RX ADMIN — DILTIAZEM HYDROCHLORIDE 30 MG: 30 TABLET, FILM COATED ORAL at 06:46

## 2018-10-21 RX ADMIN — DIGOXIN 125 MCG: 0.12 TABLET ORAL at 13:30

## 2018-10-21 RX ADMIN — DILTIAZEM HYDROCHLORIDE 30 MG: 30 TABLET, FILM COATED ORAL at 21:17

## 2018-10-21 RX ADMIN — Medication 3 ML: at 09:24

## 2018-10-21 RX ADMIN — ENOXAPARIN SODIUM 50 MG: 100 INJECTION SUBCUTANEOUS at 21:16

## 2018-10-21 RX ADMIN — DILTIAZEM HYDROCHLORIDE 30 MG: 30 TABLET, FILM COATED ORAL at 14:43

## 2018-10-22 ENCOUNTER — APPOINTMENT (OUTPATIENT)
Dept: CT IMAGING | Facility: HOSPITAL | Age: 83
End: 2018-10-22

## 2018-10-22 ENCOUNTER — APPOINTMENT (OUTPATIENT)
Dept: RESPIRATORY THERAPY | Facility: HOSPITAL | Age: 83
End: 2018-10-22
Attending: INTERNAL MEDICINE

## 2018-10-22 LAB
ANION GAP SERPL CALCULATED.3IONS-SCNC: 8.3 MMOL/L
BUN BLD-MCNC: 13 MG/DL (ref 8–23)
BUN/CREAT SERPL: 17.8 (ref 7–25)
CALCIUM SPEC-SCNC: 8.8 MG/DL (ref 8.6–10.5)
CHLORIDE SERPL-SCNC: 106 MMOL/L (ref 98–107)
CO2 SERPL-SCNC: 24.7 MMOL/L (ref 22–29)
CREAT BLD-MCNC: 0.73 MG/DL (ref 0.57–1)
DEPRECATED RDW RBC AUTO: 46 FL (ref 37–54)
ERYTHROCYTE [DISTWIDTH] IN BLOOD BY AUTOMATED COUNT: 13.5 % (ref 11.7–13)
GFR SERPL CREATININE-BSD FRML MDRD: 77 ML/MIN/1.73
GLUCOSE BLD-MCNC: 97 MG/DL (ref 65–99)
HCT VFR BLD AUTO: 38.7 % (ref 35.6–45.5)
HCT VFR BLDA CALC: 33 % (ref 38–51)
HCT VFR BLDA CALC: 35 % (ref 38–51)
HGB BLD-MCNC: 12.2 G/DL (ref 11.9–15.5)
HGB BLDA-MCNC: 11.2 G/DL (ref 12–17)
HGB BLDA-MCNC: 11.9 G/DL (ref 12–17)
MCH RBC QN AUTO: 29.5 PG (ref 26.9–32)
MCHC RBC AUTO-ENTMCNC: 31.5 G/DL (ref 32.4–36.3)
MCV RBC AUTO: 93.5 FL (ref 80.5–98.2)
PLATELET # BLD AUTO: 176 10*3/MM3 (ref 140–500)
PMV BLD AUTO: 8.3 FL (ref 6–12)
POTASSIUM BLD-SCNC: 4.2 MMOL/L (ref 3.5–5.2)
RBC # BLD AUTO: 4.14 10*6/MM3 (ref 3.9–5.2)
SAO2 % BLDA: 72 % (ref 95–98)
SAO2 % BLDA: 90 % (ref 95–98)
SODIUM BLD-SCNC: 139 MMOL/L (ref 136–145)
WBC NRBC COR # BLD: 5.83 10*3/MM3 (ref 4.5–10.7)

## 2018-10-22 PROCEDURE — 93005 ELECTROCARDIOGRAM TRACING: CPT | Performed by: INTERNAL MEDICINE

## 2018-10-22 PROCEDURE — 80048 BASIC METABOLIC PNL TOTAL CA: CPT | Performed by: INTERNAL MEDICINE

## 2018-10-22 PROCEDURE — 94060 EVALUATION OF WHEEZING: CPT

## 2018-10-22 PROCEDURE — 25010000002 ENOXAPARIN PER 10 MG: Performed by: INTERNAL MEDICINE

## 2018-10-22 PROCEDURE — 93010 ELECTROCARDIOGRAM REPORT: CPT | Performed by: INTERNAL MEDICINE

## 2018-10-22 PROCEDURE — 94729 DIFFUSING CAPACITY: CPT

## 2018-10-22 PROCEDURE — 85027 COMPLETE CBC AUTOMATED: CPT | Performed by: INTERNAL MEDICINE

## 2018-10-22 PROCEDURE — 94640 AIRWAY INHALATION TREATMENT: CPT

## 2018-10-22 PROCEDURE — 99223 1ST HOSP IP/OBS HIGH 75: CPT | Performed by: THORACIC SURGERY (CARDIOTHORACIC VASCULAR SURGERY)

## 2018-10-22 PROCEDURE — 99232 SBSQ HOSP IP/OBS MODERATE 35: CPT | Performed by: INTERNAL MEDICINE

## 2018-10-22 PROCEDURE — 94726 PLETHYSMOGRAPHY LUNG VOLUMES: CPT

## 2018-10-22 PROCEDURE — 71275 CT ANGIOGRAPHY CHEST: CPT

## 2018-10-22 PROCEDURE — 0 IOPAMIDOL PER 1 ML: Performed by: INTERNAL MEDICINE

## 2018-10-22 PROCEDURE — 74174 CTA ABD&PLVS W/CONTRAST: CPT

## 2018-10-22 RX ORDER — DIGOXIN 250 MCG
250 TABLET ORAL
Status: DISCONTINUED | OUTPATIENT
Start: 2018-10-23 | End: 2018-10-25 | Stop reason: HOSPADM

## 2018-10-22 RX ORDER — ALBUTEROL SULFATE 2.5 MG/3ML
2.5 SOLUTION RESPIRATORY (INHALATION) ONCE
Status: COMPLETED | OUTPATIENT
Start: 2018-10-22 | End: 2018-10-22

## 2018-10-22 RX ORDER — DILTIAZEM HYDROCHLORIDE 120 MG/1
120 CAPSULE, COATED, EXTENDED RELEASE ORAL
Status: DISCONTINUED | OUTPATIENT
Start: 2018-10-22 | End: 2018-10-23

## 2018-10-22 RX ADMIN — IOPAMIDOL 95 ML: 755 INJECTION, SOLUTION INTRAVENOUS at 12:58

## 2018-10-22 RX ADMIN — ENOXAPARIN SODIUM 50 MG: 100 INJECTION SUBCUTANEOUS at 12:35

## 2018-10-22 RX ADMIN — ENOXAPARIN SODIUM 50 MG: 100 INJECTION SUBCUTANEOUS at 22:36

## 2018-10-22 RX ADMIN — ALBUTEROL SULFATE 2.5 MG: 2.5 SOLUTION RESPIRATORY (INHALATION) at 08:27

## 2018-10-22 RX ADMIN — DILTIAZEM HYDROCHLORIDE 30 MG: 30 TABLET, FILM COATED ORAL at 05:30

## 2018-10-22 RX ADMIN — DILTIAZEM HYDROCHLORIDE 120 MG: 120 CAPSULE, COATED, EXTENDED RELEASE ORAL at 17:17

## 2018-10-22 RX ADMIN — Medication 3 ML: at 20:28

## 2018-10-23 LAB
ANION GAP SERPL CALCULATED.3IONS-SCNC: 11.1 MMOL/L
BUN BLD-MCNC: 12 MG/DL (ref 8–23)
BUN/CREAT SERPL: 19.7 (ref 7–25)
CALCIUM SPEC-SCNC: 8.7 MG/DL (ref 8.6–10.5)
CHLORIDE SERPL-SCNC: 103 MMOL/L (ref 98–107)
CO2 SERPL-SCNC: 23.9 MMOL/L (ref 22–29)
CREAT BLD-MCNC: 0.61 MG/DL (ref 0.57–1)
DEPRECATED RDW RBC AUTO: 45.4 FL (ref 37–54)
ERYTHROCYTE [DISTWIDTH] IN BLOOD BY AUTOMATED COUNT: 13.5 % (ref 11.7–13)
GFR SERPL CREATININE-BSD FRML MDRD: 94 ML/MIN/1.73
GLUCOSE BLD-MCNC: 93 MG/DL (ref 65–99)
HCT VFR BLD AUTO: 36.3 % (ref 35.6–45.5)
HGB BLD-MCNC: 11.8 G/DL (ref 11.9–15.5)
MCH RBC QN AUTO: 29.9 PG (ref 26.9–32)
MCHC RBC AUTO-ENTMCNC: 32.5 G/DL (ref 32.4–36.3)
MCV RBC AUTO: 92.1 FL (ref 80.5–98.2)
PLATELET # BLD AUTO: 204 10*3/MM3 (ref 140–500)
PMV BLD AUTO: 8.5 FL (ref 6–12)
POTASSIUM BLD-SCNC: 4 MMOL/L (ref 3.5–5.2)
RBC # BLD AUTO: 3.94 10*6/MM3 (ref 3.9–5.2)
SODIUM BLD-SCNC: 138 MMOL/L (ref 136–145)
WBC NRBC COR # BLD: 5.02 10*3/MM3 (ref 4.5–10.7)

## 2018-10-23 PROCEDURE — 99233 SBSQ HOSP IP/OBS HIGH 50: CPT | Performed by: INTERNAL MEDICINE

## 2018-10-23 PROCEDURE — 99232 SBSQ HOSP IP/OBS MODERATE 35: CPT | Performed by: PHYSICIAN ASSISTANT

## 2018-10-23 PROCEDURE — 99232 SBSQ HOSP IP/OBS MODERATE 35: CPT | Performed by: INTERNAL MEDICINE

## 2018-10-23 PROCEDURE — 80048 BASIC METABOLIC PNL TOTAL CA: CPT | Performed by: INTERNAL MEDICINE

## 2018-10-23 PROCEDURE — 85027 COMPLETE CBC AUTOMATED: CPT | Performed by: INTERNAL MEDICINE

## 2018-10-23 PROCEDURE — 99233 SBSQ HOSP IP/OBS HIGH 50: CPT | Performed by: THORACIC SURGERY (CARDIOTHORACIC VASCULAR SURGERY)

## 2018-10-23 RX ORDER — DILTIAZEM HYDROCHLORIDE 180 MG/1
180 CAPSULE, COATED, EXTENDED RELEASE ORAL
Status: DISCONTINUED | OUTPATIENT
Start: 2018-10-24 | End: 2018-10-25 | Stop reason: HOSPADM

## 2018-10-23 RX ADMIN — Medication 3 ML: at 21:00

## 2018-10-23 RX ADMIN — DILTIAZEM HYDROCHLORIDE 120 MG: 120 CAPSULE, COATED, EXTENDED RELEASE ORAL at 07:54

## 2018-10-23 RX ADMIN — Medication 3 ML: at 09:00

## 2018-10-23 RX ADMIN — DIGOXIN 250 MCG: 250 TABLET ORAL at 12:30

## 2018-10-24 LAB
ABO GROUP BLD: NORMAL
ANION GAP SERPL CALCULATED.3IONS-SCNC: 11.8 MMOL/L
BILIRUB UR QL STRIP: NEGATIVE
BLD GP AB SCN SERPL QL: NEGATIVE
BUN BLD-MCNC: 10 MG/DL (ref 8–23)
BUN/CREAT SERPL: 16.9 (ref 7–25)
CALCIUM SPEC-SCNC: 8.9 MG/DL (ref 8.6–10.5)
CHLORIDE SERPL-SCNC: 103 MMOL/L (ref 98–107)
CLARITY UR: CLEAR
CO2 SERPL-SCNC: 23.2 MMOL/L (ref 22–29)
COLOR UR: YELLOW
CREAT BLD-MCNC: 0.59 MG/DL (ref 0.57–1)
DEPRECATED RDW RBC AUTO: 45.4 FL (ref 37–54)
ERYTHROCYTE [DISTWIDTH] IN BLOOD BY AUTOMATED COUNT: 13.4 % (ref 11.7–13)
GFR SERPL CREATININE-BSD FRML MDRD: 98 ML/MIN/1.73
GLUCOSE BLD-MCNC: 107 MG/DL (ref 65–99)
GLUCOSE UR STRIP-MCNC: NEGATIVE MG/DL
HCT VFR BLD AUTO: 37.2 % (ref 35.6–45.5)
HGB BLD-MCNC: 11.8 G/DL (ref 11.9–15.5)
HGB UR QL STRIP.AUTO: NEGATIVE
KETONES UR QL STRIP: NEGATIVE
LEUKOCYTE ESTERASE UR QL STRIP.AUTO: NEGATIVE
MCH RBC QN AUTO: 29.4 PG (ref 26.9–32)
MCHC RBC AUTO-ENTMCNC: 31.7 G/DL (ref 32.4–36.3)
MCV RBC AUTO: 92.5 FL (ref 80.5–98.2)
NITRITE UR QL STRIP: NEGATIVE
PH UR STRIP.AUTO: 7 [PH] (ref 5–8)
PLATELET # BLD AUTO: 175 10*3/MM3 (ref 140–500)
PMV BLD AUTO: 8.6 FL (ref 6–12)
POTASSIUM BLD-SCNC: 3.8 MMOL/L (ref 3.5–5.2)
PROT UR QL STRIP: NEGATIVE
RBC # BLD AUTO: 4.02 10*6/MM3 (ref 3.9–5.2)
RH BLD: POSITIVE
SODIUM BLD-SCNC: 138 MMOL/L (ref 136–145)
SP GR UR STRIP: 1.01 (ref 1–1.03)
T&S EXPIRATION DATE: NORMAL
UROBILINOGEN UR QL STRIP: NORMAL
WBC NRBC COR # BLD: 5.78 10*3/MM3 (ref 4.5–10.7)

## 2018-10-24 PROCEDURE — 99232 SBSQ HOSP IP/OBS MODERATE 35: CPT | Performed by: INTERNAL MEDICINE

## 2018-10-24 PROCEDURE — 80048 BASIC METABOLIC PNL TOTAL CA: CPT | Performed by: INTERNAL MEDICINE

## 2018-10-24 PROCEDURE — 85027 COMPLETE CBC AUTOMATED: CPT | Performed by: INTERNAL MEDICINE

## 2018-10-24 PROCEDURE — 99233 SBSQ HOSP IP/OBS HIGH 50: CPT | Performed by: THORACIC SURGERY (CARDIOTHORACIC VASCULAR SURGERY)

## 2018-10-24 PROCEDURE — 81003 URINALYSIS AUTO W/O SCOPE: CPT | Performed by: NURSE PRACTITIONER

## 2018-10-24 PROCEDURE — 86901 BLOOD TYPING SEROLOGIC RH(D): CPT | Performed by: NURSE PRACTITIONER

## 2018-10-24 PROCEDURE — 86900 BLOOD TYPING SEROLOGIC ABO: CPT

## 2018-10-24 PROCEDURE — 86923 COMPATIBILITY TEST ELECTRIC: CPT

## 2018-10-24 PROCEDURE — 86850 RBC ANTIBODY SCREEN: CPT | Performed by: NURSE PRACTITIONER

## 2018-10-24 PROCEDURE — 86901 BLOOD TYPING SEROLOGIC RH(D): CPT

## 2018-10-24 PROCEDURE — 86900 BLOOD TYPING SEROLOGIC ABO: CPT | Performed by: NURSE PRACTITIONER

## 2018-10-24 RX ORDER — CHLORHEXIDINE GLUCONATE 0.12 MG/ML
15 RINSE ORAL EVERY 12 HOURS SCHEDULED
Status: DISCONTINUED | OUTPATIENT
Start: 2018-10-24 | End: 2018-10-25 | Stop reason: HOSPADM

## 2018-10-24 RX ORDER — DOCUSATE SODIUM 100 MG/1
100 CAPSULE, LIQUID FILLED ORAL 2 TIMES DAILY PRN
Status: DISCONTINUED | OUTPATIENT
Start: 2018-10-24 | End: 2018-10-25 | Stop reason: HOSPADM

## 2018-10-24 RX ADMIN — DIGOXIN 250 MCG: 250 TABLET ORAL at 13:20

## 2018-10-24 RX ADMIN — Medication 3 ML: at 08:30

## 2018-10-24 RX ADMIN — DILTIAZEM HYDROCHLORIDE 180 MG: 180 CAPSULE, COATED, EXTENDED RELEASE ORAL at 08:30

## 2018-10-24 RX ADMIN — DOCUSATE SODIUM 100 MG: 100 CAPSULE, LIQUID FILLED ORAL at 11:38

## 2018-10-24 RX ADMIN — Medication 3 ML: at 21:00

## 2018-10-25 ENCOUNTER — TELEPHONE (OUTPATIENT)
Dept: CARDIOLOGY | Facility: CLINIC | Age: 83
End: 2018-10-25

## 2018-10-25 ENCOUNTER — PREP FOR SURGERY (OUTPATIENT)
Dept: OTHER | Facility: HOSPITAL | Age: 83
End: 2018-10-25

## 2018-10-25 VITALS
TEMPERATURE: 97.6 F | SYSTOLIC BLOOD PRESSURE: 104 MMHG | OXYGEN SATURATION: 93 % | HEART RATE: 80 BPM | WEIGHT: 117 LBS | DIASTOLIC BLOOD PRESSURE: 73 MMHG | RESPIRATION RATE: 16 BRPM | HEIGHT: 64 IN | BODY MASS INDEX: 19.97 KG/M2

## 2018-10-25 PROCEDURE — 99232 SBSQ HOSP IP/OBS MODERATE 35: CPT | Performed by: PHYSICIAN ASSISTANT

## 2018-10-25 PROCEDURE — 99233 SBSQ HOSP IP/OBS HIGH 50: CPT | Performed by: THORACIC SURGERY (CARDIOTHORACIC VASCULAR SURGERY)

## 2018-10-25 PROCEDURE — 99239 HOSP IP/OBS DSCHRG MGMT >30: CPT | Performed by: INTERNAL MEDICINE

## 2018-10-25 RX ORDER — DIGOXIN 250 MCG
250 TABLET ORAL
Qty: 30 TABLET | Refills: 11 | Status: SHIPPED | OUTPATIENT
Start: 2018-10-25 | End: 2019-10-10 | Stop reason: SDUPTHER

## 2018-10-25 RX ORDER — DILTIAZEM HYDROCHLORIDE 180 MG/1
180 CAPSULE, COATED, EXTENDED RELEASE ORAL
Qty: 30 CAPSULE | Refills: 11 | Status: SHIPPED | OUTPATIENT
Start: 2018-10-25 | End: 2019-01-08

## 2018-10-25 RX ADMIN — DILTIAZEM HYDROCHLORIDE 180 MG: 180 CAPSULE, COATED, EXTENDED RELEASE ORAL at 09:19

## 2018-10-25 RX ADMIN — Medication 3 ML: at 09:20

## 2018-10-25 RX ADMIN — DIGOXIN 250 MCG: 250 TABLET ORAL at 13:16

## 2018-10-26 ENCOUNTER — PREP FOR SURGERY (OUTPATIENT)
Dept: OTHER | Facility: HOSPITAL | Age: 83
End: 2018-10-26

## 2018-10-26 ENCOUNTER — READMISSION MANAGEMENT (OUTPATIENT)
Dept: CALL CENTER | Facility: HOSPITAL | Age: 83
End: 2018-10-26

## 2018-10-26 DIAGNOSIS — I35.0 AORTIC VALVE STENOSIS, ETIOLOGY OF CARDIAC VALVE DISEASE UNSPECIFIED: Primary | ICD-10-CM

## 2018-10-26 RX ORDER — CHLORHEXIDINE GLUCONATE 0.12 MG/ML
15 RINSE ORAL ONCE
Status: CANCELLED | OUTPATIENT
Start: 2018-10-30 | End: 2018-10-30

## 2018-10-26 RX ORDER — CEFAZOLIN SODIUM 2 G/100ML
2 INJECTION, SOLUTION INTRAVENOUS ONCE
Status: CANCELLED | OUTPATIENT
Start: 2018-10-30 | End: 2018-10-30

## 2018-10-27 NOTE — OUTREACH NOTE
Prep Survey      Responses   Facility patient discharged from?  [facility]   Is patient eligible?  Yes   Discharge diagnosis  A-fib with RVR,  Heart cath - severe aortic stenosis   Does the patient have one of the following disease processes/diagnoses(primary or secondary)?  Other   Does the patient have Home health ordered?  No   Is there a DME ordered?  No   General alerts for this patient  For TVAR on 10/30   Prep survey completed?  Yes          Olinda Pulido RN

## 2018-10-29 ENCOUNTER — READMISSION MANAGEMENT (OUTPATIENT)
Dept: CALL CENTER | Facility: HOSPITAL | Age: 83
End: 2018-10-29

## 2018-10-29 ENCOUNTER — ANESTHESIA EVENT (OUTPATIENT)
Dept: PERIOP | Facility: HOSPITAL | Age: 83
End: 2018-10-29

## 2018-10-29 NOTE — OUTREACH NOTE
Medical Week 1 Survey      Responses   Facility patient discharged from?  [facility]   Does the patient have one of the following disease processes/diagnoses(primary or secondary)?  Other   Is there a successful TCM telephone encounter documented?  No   Week 1 attempt successful?  Yes   Call start time  1142   Call end time  1151   General alerts for this patient  For TVAR on 10/30   Discharge diagnosis  A-fib with RVR,  Heart cath - severe aortic stenosis   Meds reviewed with patient/caregiver?  Yes   Is the patient having any side effects they believe may be caused by any medication additions or changes?  No   Does the patient have all medications ordered at discharge?  Yes   Is the patient taking all medications as directed (includes completed medication regime)?  Yes   Comments regarding appointments  Patient scheduled for valve replacement juan on 10/30/2018 at [facility]   Does the patient have a primary care provider?   Yes   Does the patient have an appointment with their PCP within 7 days of discharge?  N/A [Will be a readmission tomorrow- for surgery]   Comments regarding PCP  Dr Carter- PCP.    Has the patient kept scheduled appointments due by today?  Yes   Psychosocial issues?  No   Comments  No SOB, dizziness, or chest pain.    Did the patient receive a copy of their discharge instructions?  Yes   Nursing interventions  Reviewed instructions with patient   What is the patient's perception of their health status since discharge?  Improving   Is the patient/caregiver able to teach back signs and symptoms related to disease process for when to call PCP?  Yes   Is the patient/caregiver able to teach back signs and symptoms related to disease process for when to call 911?  Yes   Is the patient/caregiver able to teach back the hierarchy of who to call/visit for symptoms/problems? PCP, Specialist, Home health nurse, Urgent Care, ED, 911  Yes   Week 1 call completed?  Yes          Bladimir Vargas RN

## 2018-10-30 ENCOUNTER — APPOINTMENT (OUTPATIENT)
Dept: GENERAL RADIOLOGY | Facility: HOSPITAL | Age: 83
End: 2018-10-30

## 2018-10-30 ENCOUNTER — ANCILLARY PROCEDURE (OUTPATIENT)
Dept: PERIOP | Facility: HOSPITAL | Age: 83
End: 2018-10-30

## 2018-10-30 ENCOUNTER — READMISSION MANAGEMENT (OUTPATIENT)
Dept: CALL CENTER | Facility: HOSPITAL | Age: 83
End: 2018-10-30

## 2018-10-30 ENCOUNTER — ANESTHESIA (OUTPATIENT)
Dept: PERIOP | Facility: HOSPITAL | Age: 83
End: 2018-10-30

## 2018-10-30 ENCOUNTER — HOSPITAL ENCOUNTER (INPATIENT)
Facility: HOSPITAL | Age: 83
LOS: 1 days | Discharge: HOME | End: 2018-10-31
Attending: THORACIC SURGERY (CARDIOTHORACIC VASCULAR SURGERY) | Admitting: INTERNAL MEDICINE

## 2018-10-30 DIAGNOSIS — I35.0 NONRHEUMATIC AORTIC VALVE STENOSIS: Primary | ICD-10-CM

## 2018-10-30 DIAGNOSIS — I35.0 AORTIC VALVE STENOSIS, ETIOLOGY OF CARDIAC VALVE DISEASE UNSPECIFIED: ICD-10-CM

## 2018-10-30 DIAGNOSIS — I50.32 CHRONIC DIASTOLIC CONGESTIVE HEART FAILURE (HCC): ICD-10-CM

## 2018-10-30 LAB
BASE EXCESS BLDA CALC-SCNC: -3 MMOL/L (ref -5–5)
BASE EXCESS BLDA CALC-SCNC: 0 MMOL/L (ref -5–5)
CO2 BLDA-SCNC: 24 MMOL/L (ref 24–29)
CO2 BLDA-SCNC: 27 MMOL/L (ref 24–29)
GLUCOSE BLDC GLUCOMTR-MCNC: 100 MG/DL (ref 70–130)
GLUCOSE BLDC GLUCOMTR-MCNC: 110 MG/DL (ref 70–130)
HCO3 BLDA-SCNC: 22.7 MMOL/L (ref 22–26)
HCO3 BLDA-SCNC: 25.7 MMOL/L (ref 22–26)
HCT VFR BLDA CALC: 27 % (ref 38–51)
HCT VFR BLDA CALC: 28 % (ref 38–51)
HGB BLDA-MCNC: 9.2 G/DL (ref 12–17)
HGB BLDA-MCNC: 9.5 G/DL (ref 12–17)
PCO2 BLDA: 43.2 MM HG (ref 35–45)
PCO2 BLDA: 49.6 MM HG (ref 35–45)
PH BLDA: 7.32 PH UNITS (ref 7.35–7.6)
PH BLDA: 7.33 PH UNITS (ref 7.35–7.6)
PO2 BLDA: 112 MMHG (ref 80–105)
PO2 BLDA: 140 MMHG (ref 80–105)
POTASSIUM BLDA-SCNC: 3.1 MMOL/L (ref 3.5–4.9)
POTASSIUM BLDA-SCNC: 3.3 MMOL/L (ref 3.5–4.9)
SAO2 % BLDA: 98 % (ref 95–98)
SAO2 % BLDA: 99 % (ref 95–98)

## 2018-10-30 PROCEDURE — C1769 GUIDE WIRE: HCPCS | Performed by: THORACIC SURGERY (CARDIOTHORACIC VASCULAR SURGERY)

## 2018-10-30 PROCEDURE — 33361 REPLACE AORTIC VALVE PERQ: CPT | Performed by: THORACIC SURGERY (CARDIOTHORACIC VASCULAR SURGERY)

## 2018-10-30 PROCEDURE — 25010000002 PROTAMINE SULFATE PER 10 MG: Performed by: ANESTHESIOLOGY

## 2018-10-30 PROCEDURE — 85347 COAGULATION TIME ACTIVATED: CPT

## 2018-10-30 PROCEDURE — 25010000002 PROPOFOL 10 MG/ML EMULSION: Performed by: ANESTHESIOLOGY

## 2018-10-30 PROCEDURE — C1751 CATH, INF, PER/CENT/MIDLINE: HCPCS | Performed by: ANESTHESIOLOGY

## 2018-10-30 PROCEDURE — 71045 X-RAY EXAM CHEST 1 VIEW: CPT

## 2018-10-30 PROCEDURE — 85018 HEMOGLOBIN: CPT

## 2018-10-30 PROCEDURE — 25010000002 HEPARIN (PORCINE) PER 1000 UNITS: Performed by: ANESTHESIOLOGY

## 2018-10-30 PROCEDURE — B41D1ZZ FLUOROSCOPY OF AORTA AND BILATERAL LOWER EXTREMITY ARTERIES USING LOW OSMOLAR CONTRAST: ICD-10-PCS | Performed by: THORACIC SURGERY (CARDIOTHORACIC VASCULAR SURGERY)

## 2018-10-30 PROCEDURE — C1760 CLOSURE DEV, VASC: HCPCS | Performed by: THORACIC SURGERY (CARDIOTHORACIC VASCULAR SURGERY)

## 2018-10-30 PROCEDURE — 02RF08Z REPLACEMENT OF AORTIC VALVE WITH ZOOPLASTIC TISSUE, OPEN APPROACH: ICD-10-PCS | Performed by: THORACIC SURGERY (CARDIOTHORACIC VASCULAR SURGERY)

## 2018-10-30 PROCEDURE — 25010000002 FENTANYL CITRATE (PF) 100 MCG/2ML SOLUTION: Performed by: ANESTHESIOLOGY

## 2018-10-30 PROCEDURE — 93010 ELECTROCARDIOGRAM REPORT: CPT | Performed by: INTERNAL MEDICINE

## 2018-10-30 PROCEDURE — C1894 INTRO/SHEATH, NON-LASER: HCPCS | Performed by: THORACIC SURGERY (CARDIOTHORACIC VASCULAR SURGERY)

## 2018-10-30 PROCEDURE — 93005 ELECTROCARDIOGRAM TRACING: CPT | Performed by: INTERNAL MEDICINE

## 2018-10-30 PROCEDURE — 25010000002 MIDAZOLAM PER 1 MG: Performed by: ANESTHESIOLOGY

## 2018-10-30 PROCEDURE — 25010000003 CEFAZOLIN IN DEXTROSE 2-4 GM/100ML-% SOLUTION: Performed by: NURSE PRACTITIONER

## 2018-10-30 PROCEDURE — 85014 HEMATOCRIT: CPT

## 2018-10-30 PROCEDURE — 25010000002 HEPARIN (PORCINE) PER 1000 UNITS: Performed by: THORACIC SURGERY (CARDIOTHORACIC VASCULAR SURGERY)

## 2018-10-30 PROCEDURE — 25010000002 PHENYLEPHRINE PER 1 ML: Performed by: ANESTHESIOLOGY

## 2018-10-30 PROCEDURE — 82803 BLOOD GASES ANY COMBINATION: CPT

## 2018-10-30 PROCEDURE — 82947 ASSAY GLUCOSE BLOOD QUANT: CPT

## 2018-10-30 DEVICE — EDWARDS SAPIEN 3 TRANSCATHETER HEART VALVE (23MM)
Type: IMPLANTABLE DEVICE | Site: HEART | Status: FUNCTIONAL
Brand: EDWARDS SAPIEN 3 TRANSCATHETER HEART VALVE (THV)

## 2018-10-30 RX ORDER — MIDAZOLAM HYDROCHLORIDE 1 MG/ML
1 INJECTION INTRAMUSCULAR; INTRAVENOUS
Status: DISCONTINUED | OUTPATIENT
Start: 2018-10-30 | End: 2018-10-30 | Stop reason: HOSPADM

## 2018-10-30 RX ORDER — SODIUM CHLORIDE 0.9 % (FLUSH) 0.9 %
1-10 SYRINGE (ML) INJECTION AS NEEDED
Status: DISCONTINUED | OUTPATIENT
Start: 2018-10-30 | End: 2018-10-30 | Stop reason: HOSPADM

## 2018-10-30 RX ORDER — SODIUM CHLORIDE 9 MG/ML
INJECTION, SOLUTION INTRAVENOUS CONTINUOUS PRN
Status: DISCONTINUED | OUTPATIENT
Start: 2018-10-30 | End: 2018-10-30 | Stop reason: SURG

## 2018-10-30 RX ORDER — SODIUM CHLORIDE 9 MG/ML
15 INJECTION, SOLUTION INTRAVENOUS CONTINUOUS
Status: DISCONTINUED | OUTPATIENT
Start: 2018-10-30 | End: 2018-10-31 | Stop reason: HOSPADM

## 2018-10-30 RX ORDER — MIDAZOLAM HYDROCHLORIDE 1 MG/ML
2 INJECTION INTRAMUSCULAR; INTRAVENOUS
Status: DISCONTINUED | OUTPATIENT
Start: 2018-10-30 | End: 2018-10-30 | Stop reason: HOSPADM

## 2018-10-30 RX ORDER — CHLORHEXIDINE GLUCONATE 0.12 MG/ML
15 RINSE ORAL ONCE
Status: DISCONTINUED | OUTPATIENT
Start: 2018-10-30 | End: 2018-10-30 | Stop reason: HOSPADM

## 2018-10-30 RX ORDER — MAGNESIUM HYDROXIDE 1200 MG/15ML
LIQUID ORAL AS NEEDED
Status: DISCONTINUED | OUTPATIENT
Start: 2018-10-30 | End: 2018-10-30 | Stop reason: HOSPADM

## 2018-10-30 RX ORDER — SODIUM CHLORIDE 9 MG/ML
100 INJECTION, SOLUTION INTRAVENOUS CONTINUOUS
Status: DISCONTINUED | OUTPATIENT
Start: 2018-10-30 | End: 2018-10-30

## 2018-10-30 RX ORDER — PROPOFOL 10 MG/ML
VIAL (ML) INTRAVENOUS CONTINUOUS PRN
Status: DISCONTINUED | OUTPATIENT
Start: 2018-10-30 | End: 2018-10-30 | Stop reason: SURG

## 2018-10-30 RX ORDER — LIDOCAINE HYDROCHLORIDE 10 MG/ML
0.5 INJECTION, SOLUTION EPIDURAL; INFILTRATION; INTRACAUDAL; PERINEURAL ONCE AS NEEDED
Status: DISCONTINUED | OUTPATIENT
Start: 2018-10-30 | End: 2018-10-30 | Stop reason: HOSPADM

## 2018-10-30 RX ORDER — FENTANYL CITRATE 50 UG/ML
50 INJECTION, SOLUTION INTRAMUSCULAR; INTRAVENOUS
Status: DISCONTINUED | OUTPATIENT
Start: 2018-10-30 | End: 2018-10-30 | Stop reason: HOSPADM

## 2018-10-30 RX ORDER — CEFAZOLIN SODIUM 2 G/100ML
2 INJECTION, SOLUTION INTRAVENOUS ONCE
Status: COMPLETED | OUTPATIENT
Start: 2018-10-30 | End: 2018-10-30

## 2018-10-30 RX ORDER — PROTAMINE SULFATE 10 MG/ML
INJECTION, SOLUTION INTRAVENOUS AS NEEDED
Status: DISCONTINUED | OUTPATIENT
Start: 2018-10-30 | End: 2018-10-30 | Stop reason: SURG

## 2018-10-30 RX ORDER — DILTIAZEM HYDROCHLORIDE 180 MG/1
180 CAPSULE, COATED, EXTENDED RELEASE ORAL ONCE
Status: COMPLETED | OUTPATIENT
Start: 2018-10-30 | End: 2018-10-30

## 2018-10-30 RX ORDER — HEPARIN SODIUM 1000 [USP'U]/ML
INJECTION, SOLUTION INTRAVENOUS; SUBCUTANEOUS AS NEEDED
Status: DISCONTINUED | OUTPATIENT
Start: 2018-10-30 | End: 2018-10-30 | Stop reason: SURG

## 2018-10-30 RX ORDER — DILTIAZEM HYDROCHLORIDE 180 MG/1
180 CAPSULE, COATED, EXTENDED RELEASE ORAL
Status: CANCELLED | OUTPATIENT
Start: 2018-10-30

## 2018-10-30 RX ORDER — CLOPIDOGREL BISULFATE 75 MG/1
75 TABLET ORAL DAILY
Status: DISCONTINUED | OUTPATIENT
Start: 2018-10-31 | End: 2018-10-31 | Stop reason: HOSPADM

## 2018-10-30 RX ORDER — DIGOXIN 250 MCG
250 TABLET ORAL
Status: CANCELLED | OUTPATIENT
Start: 2018-10-31

## 2018-10-30 RX ORDER — SODIUM CHLORIDE, SODIUM LACTATE, POTASSIUM CHLORIDE, CALCIUM CHLORIDE 600; 310; 30; 20 MG/100ML; MG/100ML; MG/100ML; MG/100ML
9 INJECTION, SOLUTION INTRAVENOUS CONTINUOUS
Status: DISCONTINUED | OUTPATIENT
Start: 2018-10-30 | End: 2018-10-31 | Stop reason: HOSPADM

## 2018-10-30 RX ORDER — ASPIRIN 81 MG/1
81 TABLET ORAL DAILY
Status: DISCONTINUED | OUTPATIENT
Start: 2018-10-30 | End: 2018-10-31 | Stop reason: HOSPADM

## 2018-10-30 RX ORDER — FAMOTIDINE 10 MG/ML
20 INJECTION, SOLUTION INTRAVENOUS ONCE
Status: COMPLETED | OUTPATIENT
Start: 2018-10-30 | End: 2018-10-30

## 2018-10-30 RX ADMIN — EPHEDRINE SULFATE 10 MG: 50 INJECTION INTRAMUSCULAR; INTRAVENOUS; SUBCUTANEOUS at 15:04

## 2018-10-30 RX ADMIN — PHENYLEPHRINE HYDROCHLORIDE 0.25 MCG/KG/MIN: 10 INJECTION, SOLUTION INTRAMUSCULAR; INTRAVENOUS; SUBCUTANEOUS at 14:34

## 2018-10-30 RX ADMIN — EPHEDRINE SULFATE 5 MG: 50 INJECTION INTRAMUSCULAR; INTRAVENOUS; SUBCUTANEOUS at 14:56

## 2018-10-30 RX ADMIN — FENTANYL CITRATE 50 MCG: 50 INJECTION INTRAMUSCULAR; INTRAVENOUS at 11:59

## 2018-10-30 RX ADMIN — SODIUM CHLORIDE 100 ML/HR: 9 INJECTION, SOLUTION INTRAVENOUS at 15:56

## 2018-10-30 RX ADMIN — FAMOTIDINE 20 MG: 10 INJECTION, SOLUTION INTRAVENOUS at 08:14

## 2018-10-30 RX ADMIN — Medication 1 MG: at 11:59

## 2018-10-30 RX ADMIN — SODIUM CHLORIDE 0.4 MCG/KG/HR: 900 INJECTION, SOLUTION INTRAVENOUS at 14:07

## 2018-10-30 RX ADMIN — PHENYLEPHRINE HYDROCHLORIDE 50 MCG: 10 INJECTION INTRAVENOUS at 14:24

## 2018-10-30 RX ADMIN — HEPARIN SODIUM 3000 UNITS: 1000 INJECTION, SOLUTION INTRAVENOUS; SUBCUTANEOUS at 14:50

## 2018-10-30 RX ADMIN — Medication 1 MG: at 11:27

## 2018-10-30 RX ADMIN — Medication 0.5 MG: at 14:12

## 2018-10-30 RX ADMIN — Medication 0.5 MG: at 14:07

## 2018-10-30 RX ADMIN — FENTANYL CITRATE 25 MCG: 50 INJECTION INTRAMUSCULAR; INTRAVENOUS at 11:40

## 2018-10-30 RX ADMIN — PROTAMINE SULFATE 110 MG: 10 INJECTION, SOLUTION INTRAVENOUS at 15:01

## 2018-10-30 RX ADMIN — ASPIRIN 81 MG: 81 TABLET, DELAYED RELEASE ORAL at 18:14

## 2018-10-30 RX ADMIN — CEFAZOLIN SODIUM 2 G: 2 INJECTION, SOLUTION INTRAVENOUS at 14:08

## 2018-10-30 RX ADMIN — FENTANYL CITRATE 25 MCG: 50 INJECTION INTRAMUSCULAR; INTRAVENOUS at 11:27

## 2018-10-30 RX ADMIN — FENTANYL CITRATE 25 MCG: 50 INJECTION INTRAMUSCULAR; INTRAVENOUS at 14:07

## 2018-10-30 RX ADMIN — PHENYLEPHRINE HYDROCHLORIDE 100 MCG: 10 INJECTION INTRAVENOUS at 14:36

## 2018-10-30 RX ADMIN — SODIUM CHLORIDE 15 ML/HR: 9 INJECTION, SOLUTION INTRAVENOUS at 16:10

## 2018-10-30 RX ADMIN — PROPOFOL 50 MCG/KG/MIN: 10 INJECTION, EMULSION INTRAVENOUS at 14:07

## 2018-10-30 RX ADMIN — Medication 1 MG: at 11:33

## 2018-10-30 RX ADMIN — PHENYLEPHRINE HYDROCHLORIDE 100 MCG: 10 INJECTION INTRAVENOUS at 14:54

## 2018-10-30 RX ADMIN — HEPARIN SODIUM 8000 UNITS: 1000 INJECTION, SOLUTION INTRAVENOUS; SUBCUTANEOUS at 14:42

## 2018-10-30 RX ADMIN — DILTIAZEM HYDROCHLORIDE 180 MG: 180 CAPSULE, COATED, EXTENDED RELEASE ORAL at 08:13

## 2018-10-30 RX ADMIN — PHENYLEPHRINE HYDROCHLORIDE 50 MCG: 10 INJECTION INTRAVENOUS at 14:30

## 2018-10-30 RX ADMIN — SODIUM CHLORIDE: 9 INJECTION, SOLUTION INTRAVENOUS at 13:51

## 2018-10-30 RX ADMIN — FENTANYL CITRATE 25 MCG: 50 INJECTION INTRAMUSCULAR; INTRAVENOUS at 14:12

## 2018-10-30 RX ADMIN — Medication 1 MG: at 11:40

## 2018-10-30 RX ADMIN — PHENYLEPHRINE HYDROCHLORIDE 100 MCG: 10 INJECTION INTRAVENOUS at 15:04

## 2018-10-30 NOTE — ANESTHESIA POSTPROCEDURE EVALUATION
Patient: Tequila Vargas    Procedure Summary     Date:  10/30/18 Room / Location:  [facility] OR 19 INV / [facility]    Anesthesia Start:  1351 Anesthesia Stop:  1530    Procedure:  TRANSFEMORAL TRANSCATHETER AORTIC VALVE REPLACEMENT WITH POSSIBLE BAIL OUT OPEN HEART CARDIAC SURGERY with TTE (N/A Chest) Diagnosis:       Aortic valve stenosis, etiology of cardiac valve disease unspecified      (Aortic valve stenosis, etiology of cardiac valve disease unspecified [I35.0])    Surgeon:  Black Hagen MD Provider:  Moise Robles MD    Anesthesia Type:  MAC ASA Status:  4          Anesthesia Type: MAC  Last vitals  BP   95/47 (10/30/18 1209)   Temp   36.7 °C (98 °F) (10/30/18 0731)   Pulse   79 (10/30/18 1209)   Resp   14 (10/30/18 1209)     SpO2   100 % (10/30/18 1209)     Post Anesthesia Care and Evaluation    Patient location during evaluation: PACU  Patient participation: complete - patient participated  Level of consciousness: awake and alert  Pain management: adequate  Airway patency: patent  Anesthetic complications: No anesthetic complications  PONV Status: none  Cardiovascular status: acceptable  Respiratory status: acceptable and nasal cannula  Hydration status: acceptable

## 2018-10-30 NOTE — ANESTHESIA PREPROCEDURE EVALUATION
Anesthesia Evaluation     Patient summary reviewed and Nursing notes reviewed   NPO Solid Status: > 8 hours  NPO Liquid Status: > 2 hours           Airway   Mallampati: II  TM distance: >3 FB  Neck ROM: full  no difficulty expected and No difficulty expected  Dental - normal exam     Pulmonary     breath sounds clear to auscultation  Cardiovascular     ECG reviewed  Rhythm: irregular  Rate: normal    (+) valvular problems/murmurs AS, dysrhythmias Atrial Fib,     ROS comment: The valve appears trileaflet. The aortic valve is abnormal in structure. Trace aortic valve regurgitation is present. The aortic valve leaflets are severely calcified. There is severe aortic stenosis with a peak gradient of 91 mm Hg, and a mean gradient of 45 mm Hg..  PE comment: Afib    Neuro/Psych  GI/Hepatic/Renal/Endo      Musculoskeletal     Abdominal  - normal exam   Substance History      OB/GYN          Other      history of cancer                  Anesthesia Plan    ASA 4     MAC   total IV anesthesia(Art line/6 FR cordis)  intravenous induction   Anesthetic plan, all risks, benefits, and alternatives have been provided, discussed and informed consent has been obtained with: patient.

## 2018-10-30 NOTE — ANESTHESIA PROCEDURE NOTES
Arterial Line    Pre-sedation assessment completed: 10/30/2018 11:32 AM    Patient reassessed immediately prior to procedure    Patient location during procedure: holding area  Start time: 10/30/2018 11:32 AM  Stop Time:10/30/2018 11:39 AM       Line placed for hemodynamic monitoring.  Performed By   Anesthesiologist: SARAH LEE  Preanesthetic Checklist  Completed: patient identified, site marked, surgical consent, pre-op evaluation, timeout performed, IV checked, risks and benefits discussed and monitors and equipment checked  Arterial Line Prep   Sterile Tech: gloves and sterile barriers  Prep: ChloraPrep  Patient monitoring: blood pressure monitoring, continuous pulse oximetry and EKG  Arterial Line Procedure   Laterality:left  Location:  radial artery  Catheter size: 20 G   Guidance: landmark technique  Number of attempts: 1  Successful placement: yes          Post Assessment   Dressing Type: occlusive dressing applied.   Complications no  Circ/Move/Sens Assessment: normal.   Patient Tolerance: patient tolerated the procedure well with no apparent complications

## 2018-10-30 NOTE — ANESTHESIA PROCEDURE NOTES
Central Line    Pre-sedation assessment completed: 10/30/2018 11:40 AM    Patient reassessed immediately prior to procedure    Patient location during procedure: holding area  Start time: 10/30/2018 11:40 AM  Stop Time:10/30/2018 12:12 PM  Indications: vascular access and MD/Surgeon request  Staff  Anesthesiologist: SARAH LEE  Preanesthetic Checklist  Completed: patient identified, site marked, surgical consent, pre-op evaluation, timeout performed, IV checked, risks and benefits discussed and monitors and equipment checked  Central Line Prep  Sterile Tech:cap, gloves, gown, mask and sterile barriers  Prep: chloraprep  Patient monitoring: blood pressure monitoring, continuous pulse oximetry and EKG  Central Line Procedure  Laterality:right  Location:internal jugular  Catheter Type:MAC, Cordis and single lumen  Catheter Size:6 Fr  Guidance:ultrasound guided  PROCEDURE NOTE/ULTRASOUND INTERPRETATION.  Using ultrasound guidance the potential vascular sites for insertion of the catheter were visualized to determine the patency of the vessel to be used for vascular access.  After selecting the appropriate site for insertion, the needle was visualized under ultrasound being inserted into the internal jugular vein, followed by ultrasound confirmation of wire and catheter placement. There were no abnormalities seen on ultrasound; an image was taken; and the patient tolerated the procedure with no complications.   Assessment  Post procedure:biopatch applied, line sutured and occlusive dressing applied  Assessement:blood return through all ports, free fluid flow, no pneumothorax on x-ray, placement verified by x-ray, chest x-ray ordered and Todd Test  Complications:no  Patient Tolerance:patient tolerated the procedure well with no apparent complications

## 2018-10-31 ENCOUNTER — APPOINTMENT (OUTPATIENT)
Dept: CARDIOLOGY | Facility: HOSPITAL | Age: 83
End: 2018-10-31
Attending: INTERNAL MEDICINE

## 2018-10-31 VITALS
SYSTOLIC BLOOD PRESSURE: 109 MMHG | DIASTOLIC BLOOD PRESSURE: 71 MMHG | BODY MASS INDEX: 20.32 KG/M2 | RESPIRATION RATE: 16 BRPM | HEIGHT: 64 IN | HEART RATE: 75 BPM | TEMPERATURE: 98.6 F | OXYGEN SATURATION: 94 % | WEIGHT: 119 LBS

## 2018-10-31 LAB
ACT BLD: 103 SECONDS (ref 82–152)
ACT BLD: 131 SECONDS (ref 82–152)
ACT BLD: 219 SECONDS (ref 82–152)
ACT BLD: 252 SECONDS (ref 82–152)
ANION GAP SERPL CALCULATED.3IONS-SCNC: 11.5 MMOL/L
AORTIC DIMENSIONLESS INDEX: 0.4 (DI)
BH CV ECHO MEAS - AO MAX PG (FULL): 18.4 MMHG
BH CV ECHO MEAS - AO MAX PG: 22.1 MMHG
BH CV ECHO MEAS - AO MEAN PG (FULL): 11 MMHG
BH CV ECHO MEAS - AO MEAN PG: 13 MMHG
BH CV ECHO MEAS - AO ROOT AREA (BSA CORRECTED): 2
BH CV ECHO MEAS - AO ROOT AREA: 7.5 CM^2
BH CV ECHO MEAS - AO ROOT DIAM: 3.1 CM
BH CV ECHO MEAS - AO V2 MAX: 235 CM/SEC
BH CV ECHO MEAS - AO V2 MEAN: 171 CM/SEC
BH CV ECHO MEAS - AO V2 VTI: 46.8 CM
BH CV ECHO MEAS - ASC AORTA: 3.2 CM
BH CV ECHO MEAS - AVA(I,A): 1.1 CM^2
BH CV ECHO MEAS - AVA(I,D): 1.1 CM^2
BH CV ECHO MEAS - AVA(V,A): 1.2 CM^2
BH CV ECHO MEAS - AVA(V,D): 1.2 CM^2
BH CV ECHO MEAS - BSA(HAYCOCK): 1.6 M^2
BH CV ECHO MEAS - BSA: 1.6 M^2
BH CV ECHO MEAS - BZI_BMI: 20.4 KILOGRAMS/M^2
BH CV ECHO MEAS - BZI_METRIC_HEIGHT: 162.6 CM
BH CV ECHO MEAS - BZI_METRIC_WEIGHT: 54 KG
BH CV ECHO MEAS - EDV(CUBED): 68.9 ML
BH CV ECHO MEAS - EDV(MOD-SP2): 44 ML
BH CV ECHO MEAS - EDV(MOD-SP4): 29 ML
BH CV ECHO MEAS - EDV(TEICH): 74.2 ML
BH CV ECHO MEAS - EF(CUBED): 64.6 %
BH CV ECHO MEAS - EF(MOD-BP): 60.8 %
BH CV ECHO MEAS - EF(MOD-SP2): 63.6 %
BH CV ECHO MEAS - EF(MOD-SP4): 58.6 %
BH CV ECHO MEAS - EF(TEICH): 56.6 %
BH CV ECHO MEAS - ESV(CUBED): 24.4 ML
BH CV ECHO MEAS - ESV(MOD-SP2): 16 ML
BH CV ECHO MEAS - ESV(MOD-SP4): 12 ML
BH CV ECHO MEAS - ESV(TEICH): 32.2 ML
BH CV ECHO MEAS - FS: 29.3 %
BH CV ECHO MEAS - IVS/LVPW: 0.73
BH CV ECHO MEAS - IVSD: 0.8 CM
BH CV ECHO MEAS - LV DIASTOLIC VOL/BSA (35-75): 18.5 ML/M^2
BH CV ECHO MEAS - LV MASS(C)D: 123 GRAMS
BH CV ECHO MEAS - LV MASS(C)DI: 78.4 GRAMS/M^2
BH CV ECHO MEAS - LV MAX PG: 3.7 MMHG
BH CV ECHO MEAS - LV MEAN PG: 2 MMHG
BH CV ECHO MEAS - LV SYSTOLIC VOL/BSA (12-30): 7.6 ML/M^2
BH CV ECHO MEAS - LV V1 MAX: 96.5 CM/SEC
BH CV ECHO MEAS - LV V1 MEAN: 66 CM/SEC
BH CV ECHO MEAS - LV V1 VTI: 18.7 CM
BH CV ECHO MEAS - LVIDD: 4.1 CM
BH CV ECHO MEAS - LVIDS: 2.9 CM
BH CV ECHO MEAS - LVLD AP2: 5.7 CM
BH CV ECHO MEAS - LVLD AP4: 5.8 CM
BH CV ECHO MEAS - LVLS AP2: 5.2 CM
BH CV ECHO MEAS - LVLS AP4: 4.8 CM
BH CV ECHO MEAS - LVOT AREA (M): 2.8 CM^2
BH CV ECHO MEAS - LVOT AREA: 2.8 CM^2
BH CV ECHO MEAS - LVOT DIAM: 1.9 CM
BH CV ECHO MEAS - LVPWD: 1.1 CM
BH CV ECHO MEAS - MV A DUR: 39 SEC
BH CV ECHO MEAS - MV DEC SLOPE: 697 CM/SEC^2
BH CV ECHO MEAS - MV DEC TIME: 0.22 SEC
BH CV ECHO MEAS - MV E MAX VEL: 128 CM/SEC
BH CV ECHO MEAS - MV MAX PG: 10.1 MMHG
BH CV ECHO MEAS - MV MEAN PG: 2 MMHG
BH CV ECHO MEAS - MV P1/2T MAX VEL: 164 CM/SEC
BH CV ECHO MEAS - MV P1/2T: 68.9 MSEC
BH CV ECHO MEAS - MV V2 MAX: 159 CM/SEC
BH CV ECHO MEAS - MV V2 MEAN: 61.1 CM/SEC
BH CV ECHO MEAS - MV V2 VTI: 39.4 CM
BH CV ECHO MEAS - MVA P1/2T LCG: 1.3 CM^2
BH CV ECHO MEAS - MVA(P1/2T): 3.2 CM^2
BH CV ECHO MEAS - MVA(VTI): 1.3 CM^2
BH CV ECHO MEAS - PA ACC TIME: 0.12 SEC
BH CV ECHO MEAS - PA MAX PG (FULL): 2.6 MMHG
BH CV ECHO MEAS - PA MAX PG: 4.2 MMHG
BH CV ECHO MEAS - PA PR(ACCEL): 25 MMHG
BH CV ECHO MEAS - PA V2 MAX: 102 CM/SEC
BH CV ECHO MEAS - PVA(V,A): 1.4 CM^2
BH CV ECHO MEAS - PVA(V,D): 1.4 CM^2
BH CV ECHO MEAS - QP/QS: 0.54
BH CV ECHO MEAS - RAP SYSTOLE: 8 MMHG
BH CV ECHO MEAS - RV MAX PG: 1.5 MMHG
BH CV ECHO MEAS - RV MEAN PG: 1 MMHG
BH CV ECHO MEAS - RV V1 MAX: 62.1 CM/SEC
BH CV ECHO MEAS - RV V1 MEAN: 47 CM/SEC
BH CV ECHO MEAS - RV V1 VTI: 12.7 CM
BH CV ECHO MEAS - RVOT AREA: 2.3 CM^2
BH CV ECHO MEAS - RVOT DIAM: 1.7 CM
BH CV ECHO MEAS - RVSP: 37 MMHG
BH CV ECHO MEAS - SI(AO): 225.2 ML/M^2
BH CV ECHO MEAS - SI(CUBED): 28.4 ML/M^2
BH CV ECHO MEAS - SI(LVOT): 33.8 ML/M^2
BH CV ECHO MEAS - SI(MOD-SP2): 17.8 ML/M^2
BH CV ECHO MEAS - SI(MOD-SP4): 10.8 ML/M^2
BH CV ECHO MEAS - SI(TEICH): 26.8 ML/M^2
BH CV ECHO MEAS - SV(AO): 353.2 ML
BH CV ECHO MEAS - SV(CUBED): 44.5 ML
BH CV ECHO MEAS - SV(LVOT): 53 ML
BH CV ECHO MEAS - SV(MOD-SP2): 28 ML
BH CV ECHO MEAS - SV(MOD-SP4): 17 ML
BH CV ECHO MEAS - SV(RVOT): 28.8 ML
BH CV ECHO MEAS - SV(TEICH): 42 ML
BH CV ECHO MEAS - TAPSE (>1.6): 1.77 CM2
BH CV ECHO MEAS - TR MAX VEL: 270 CM/SEC
BH CV VAS BP LEFT ARM: NORMAL MMHG
BH CV XLRA - RV BASE: 3.05 CM
BH CV XLRA - TDI S': 12.6 CM/SEC
BUN BLD-MCNC: 8 MG/DL (ref 8–23)
BUN/CREAT SERPL: 12.3 (ref 7–25)
CALCIUM SPEC-SCNC: 8.4 MG/DL (ref 8.6–10.5)
CHLORIDE SERPL-SCNC: 107 MMOL/L (ref 98–107)
CO2 SERPL-SCNC: 22.5 MMOL/L (ref 22–29)
CREAT BLD-MCNC: 0.65 MG/DL (ref 0.57–1)
DEPRECATED RDW RBC AUTO: 44.3 FL (ref 37–54)
ERYTHROCYTE [DISTWIDTH] IN BLOOD BY AUTOMATED COUNT: 13.2 % (ref 11.7–13)
GFR SERPL CREATININE-BSD FRML MDRD: 87 ML/MIN/1.73
GLUCOSE BLD-MCNC: 84 MG/DL (ref 65–99)
HCT VFR BLD AUTO: 34 % (ref 35.6–45.5)
HGB BLD-MCNC: 10.8 G/DL (ref 11.9–15.5)
IVRT: 7 MSEC
LEFT ATRIUM VOLUME INDEX: 39 ML/M2
LV EF 2D ECHO EST: 61 %
MAXIMAL PREDICTED HEART RATE: 139 BPM
MCH RBC QN AUTO: 29.2 PG (ref 26.9–32)
MCHC RBC AUTO-ENTMCNC: 31.8 G/DL (ref 32.4–36.3)
MCV RBC AUTO: 91.9 FL (ref 80.5–98.2)
PLATELET # BLD AUTO: 183 10*3/MM3 (ref 140–500)
PMV BLD AUTO: 8.5 FL (ref 6–12)
POTASSIUM BLD-SCNC: 3.7 MMOL/L (ref 3.5–5.2)
RBC # BLD AUTO: 3.7 10*6/MM3 (ref 3.9–5.2)
SODIUM BLD-SCNC: 141 MMOL/L (ref 136–145)
STRESS TARGET HR: 118 BPM
WBC NRBC COR # BLD: 4.11 10*3/MM3 (ref 4.5–10.7)

## 2018-10-31 PROCEDURE — 99232 SBSQ HOSP IP/OBS MODERATE 35: CPT | Performed by: PHYSICIAN ASSISTANT

## 2018-10-31 PROCEDURE — 85027 COMPLETE CBC AUTOMATED: CPT | Performed by: INTERNAL MEDICINE

## 2018-10-31 PROCEDURE — 93306 TTE W/DOPPLER COMPLETE: CPT

## 2018-10-31 PROCEDURE — 93010 ELECTROCARDIOGRAM REPORT: CPT | Performed by: INTERNAL MEDICINE

## 2018-10-31 PROCEDURE — 99233 SBSQ HOSP IP/OBS HIGH 50: CPT | Performed by: THORACIC SURGERY (CARDIOTHORACIC VASCULAR SURGERY)

## 2018-10-31 PROCEDURE — 80048 BASIC METABOLIC PNL TOTAL CA: CPT | Performed by: INTERNAL MEDICINE

## 2018-10-31 PROCEDURE — 93005 ELECTROCARDIOGRAM TRACING: CPT | Performed by: INTERNAL MEDICINE

## 2018-10-31 PROCEDURE — 93306 TTE W/DOPPLER COMPLETE: CPT | Performed by: INTERNAL MEDICINE

## 2018-10-31 RX ORDER — ASPIRIN 81 MG/1
81 TABLET ORAL DAILY
Qty: 30 TABLET | Refills: 1 | Status: SHIPPED | OUTPATIENT
Start: 2018-10-31 | End: 2018-11-17 | Stop reason: HOSPADM

## 2018-10-31 RX ORDER — CLOPIDOGREL BISULFATE 75 MG/1
75 TABLET ORAL DAILY
Qty: 30 TABLET | Refills: 0 | Status: SHIPPED | OUTPATIENT
Start: 2018-10-31 | End: 2018-11-26 | Stop reason: SDUPTHER

## 2018-10-31 RX ADMIN — ASPIRIN 81 MG: 81 TABLET, DELAYED RELEASE ORAL at 09:45

## 2018-10-31 RX ADMIN — CLOPIDOGREL 75 MG: 75 TABLET, FILM COATED ORAL at 09:45

## 2018-10-31 NOTE — OUTREACH NOTE
Medical Week 2 Survey      Responses   Facility patient discharged from?  [place]   Does the patient have one of the following disease processes/diagnoses(primary or secondary)?  Other   Week 2 attempt successful?  No   Revoke  Readmitted          Olinda Pulido RN

## 2018-11-01 ENCOUNTER — TELEPHONE (OUTPATIENT)
Dept: CARDIOLOGY | Facility: CLINIC | Age: 83
End: 2018-11-01

## 2018-11-01 LAB
ABO + RH BLD: NORMAL
ABO + RH BLD: NORMAL
BH BB BLOOD EXPIRATION DATE: NORMAL
BH BB BLOOD EXPIRATION DATE: NORMAL
BH BB BLOOD TYPE BARCODE: 6200
BH BB BLOOD TYPE BARCODE: 6200
BH BB DISPENSE STATUS: NORMAL
BH BB DISPENSE STATUS: NORMAL
BH BB PRODUCT CODE: NORMAL
BH BB PRODUCT CODE: NORMAL
BH BB UNIT NUMBER: NORMAL
BH BB UNIT NUMBER: NORMAL
UNIT  ABO: NORMAL
UNIT  ABO: NORMAL
UNIT  RH: NORMAL
UNIT  RH: NORMAL

## 2018-11-01 NOTE — TELEPHONE ENCOUNTER
11/01/18   Kym,   will you please get pt appt.   Pt called she needs a 1 week appt with Kym Tierney NP for [facility]  f/up.    Thanks Jonh ACUÑA

## 2018-11-07 ENCOUNTER — OFFICE VISIT (OUTPATIENT)
Dept: CARDIOLOGY | Facility: CLINIC | Age: 83
End: 2018-11-07

## 2018-11-07 VITALS
OXYGEN SATURATION: 98 % | HEART RATE: 130 BPM | BODY MASS INDEX: 20.45 KG/M2 | SYSTOLIC BLOOD PRESSURE: 114 MMHG | DIASTOLIC BLOOD PRESSURE: 62 MMHG | WEIGHT: 119.8 LBS | HEIGHT: 64 IN

## 2018-11-07 DIAGNOSIS — I48.91 ATRIAL FIBRILLATION WITH RVR (HCC): Primary | ICD-10-CM

## 2018-11-07 DIAGNOSIS — I35.0 NONRHEUMATIC AORTIC VALVE STENOSIS: ICD-10-CM

## 2018-11-07 DIAGNOSIS — Z95.2 S/P TAVR (TRANSCATHETER AORTIC VALVE REPLACEMENT): ICD-10-CM

## 2018-11-07 PROCEDURE — 99214 OFFICE O/P EST MOD 30 MIN: CPT | Performed by: NURSE PRACTITIONER

## 2018-11-07 NOTE — PROGRESS NOTES
Patient Name: Tequila Vargas  :1935  Age: 83 y.o.  Primary Cardiologist: Sahil Marie MD  Encounter Provider:  LINDA Astudillo      Chief Complaint:   Chief Complaint   Patient presents with   • Follow-up     aortic valve stenosis         HPI  Tequila Vargas is a 83 y.o. female with a history significant for rheumatic fever as a child and aortic stenosis, atrial fibrillation with RVR.  On 10/30/18 she underwent TAVR by Dr. Corrales and Dr. Sosa.  Patient did well after discharge and remained in rate-controlled atrial fibrillation, continuing digoxin and Cardizem.  Patient is currently not anticoagulated secondary to gross hematuria while anticoagulated.  Patient was seen and evaluated by urology who felt that hematuria was related to anticoagulation, however they are scheduling a outpatient cystoscopy to be done in the next few weeks.    Patient presents today for hospital follow-up.  Patient states since the TAVR she feels better.  She does report episodes of tachycardia and heart palpitations.  Patient unfortunately got confused with her medications.  She did stop the Eliquis secondary to hematuria, unfortunately she also stopped digoxin and diltiazem.  She states that she has been seen by urology and reports that her cystoscopy is scheduled for .  Patient denies episodes of chest pain, lightheadedness, swelling.  She does report a mild amount of fatigue.    The following portions of the patient's history were reviewed and updated as appropriate: allergies, current medications, past family history, past medical history, past social history, past surgical history and problem list.    Current Outpatient Prescriptions on File Prior to Visit   Medication Sig Dispense Refill   • aspirin 81 MG EC tablet Take 1 tablet by mouth Daily. 30 tablet 1   • clopidogrel (PLAVIX) 75 MG tablet Take 1 tablet by mouth Daily. Follow-up with cardiology for refill. 30 tablet 0   • digoxin (LANOXIN) 250  "MCG tablet Take 1 tablet by mouth Daily. 30 tablet 11   • diltiaZEM CD (CARDIZEM CD) 180 MG 24 hr capsule Take 1 capsule by mouth Daily. 30 capsule 11     No current facility-administered medications on file prior to visit.          Review of Systems   Constitution: Positive for malaise/fatigue.   Cardiovascular: Positive for palpitations. Negative for chest pain and leg swelling.   Respiratory: Negative for shortness of breath.    Neurological: Negative for light-headedness.   All other systems reviewed and are negative.      OBJECTIVE:   Vital Signs  Vitals:    11/07/18 1053   BP: 114/62   Pulse: (!) 130   SpO2: 98%     Estimated body mass index is 20.56 kg/m² as calculated from the following:    Height as of this encounter: 162.6 cm (64\").    Weight as of this encounter: 54.3 kg (119 lb 12.8 oz).    Physical Exam   Constitutional: She is oriented to person, place, and time. Vital signs are normal. She appears well-developed and well-nourished. She is active.   Eyes: Conjunctivae are normal.   Neck: Carotid bruit is not present.   Cardiovascular: Normal heart sounds.  An irregularly irregular rhythm present. Tachycardia present.    130s   Pulmonary/Chest: Breath sounds normal.   Abdominal: Normal appearance.   Neurological: She is alert and oriented to person, place, and time. GCS eye subscore is 4. GCS verbal subscore is 5. GCS motor subscore is 6.   Skin: Skin is warm, dry and intact.   Psychiatric: She has a normal mood and affect. Her speech is normal and behavior is normal. Judgment and thought content normal. Cognition and memory are normal.       Procedures    Cardiac Cath:  10/21/2018:  FINDINGS:     1. HEMODYNAMICS:  RA -/7/5, RV 32/6, PA 32/13/21, PCWP -/20/13, /11, AO 89/63/72.  The peak AoV gradient was 65, mean of 40.  The cardiac output was 5.3 L/min.     2. LEFT VENTRICULOGRAPHY: EF 60%, no mitral regurgitation.     3. CORONARY ANGIOGRAPHY:  Right dominant system, mild coronary disease.  The " left main is normal.  The proximal LAD has 10% stenosis.  The mid LAD has 10% stenosis.  The circumflex has 30% proximal stenosis.  The RCA is normal.     SUMMARY: Severe aortic stenosis.     RECOMMENDATIONS: Proceed with surgical evaluation for SAVR versus TAVR.    Cardiac Echo:  10/30/2018:  · Left ventricular systolic function is normal. Calculated EF = 60.8%. Estimated EF was in agreement with the calculated EF. Estimated EF = 61%. Normal left ventricular cavity size noted. All left ventricular wall segments contract normally. Left ventricular wall thickness is consistent with mild concentric hypertrophy. Left ventricular diastolic function was indeterminate.  · Left atrial cavity size is dilated. Left atrial volume is moderately increased.  · Right atrial cavity size is borderline dilated.  · The aortic valve is not well visualized. There is a prosthetic aortic valve. There is a TAVR valve present. The aortic valve peak and mean gradients are within defined limits. Trivial perivalvular regurgitation is present. There is no significant prosthetic valve stenosis. The inferior aspect of the aortic valve prosthesis is slightly visualized the distal LV outflow tract.  · Moderate MAC is present. Trace mitral valve regurgitation is present.  · Moderate tricuspid valve regurgitation is present. Estimated right ventricular systolic pressure from tricuspid regurgitation is mildly elevated (35-45 mmHg). Calculated right ventricular systolic pressure from tricuspid regurgitation is 37 mmHg.    10/19/2018:  · There is severe aortic stenosis with a peak gradient of 91 mm Hg, and a mean gradient of 45 mm Hg..    10/17/2018:  · Estimated EF appears to be in the range of 51 - 55%.  · Left ventricular wall thickness is consistent with mild concentric hypertrophy.  · Normal right ventricular cavity size and systolic function noted.  · Left atrial cavity size is moderately dilated.  · There is at least moderate aortic stenosis,  although this may be underestimated secondary to the atrial fibrillation  · Aortic valve maximum pressure gradient is 37 mmHg. Aortic valve mean pressure gradient is 27 mmHg. The aortic valve leaflets are severely calcified.  · Mild aortic valve regurgitation is present.  · Moderate MAC is present. Mild mitral valve regurgitation is present  · Mild to moderate tricuspid valve regurgitation is present.  · Calculated right ventricular systolic pressure from tricuspid regurgitation is 43 mmHg.  · There is no evidence of pericardial effusion.    11/4/2016:  · Left ventricular function is normal. Calculated EF = 68.5%.  · Left ventricular wall thickness is consistent with mild concentric hypertrophy.  · Left ventricular diastolic dysfunction is noted (grade II w/high LAP) consistent with pseudonormalization.  · There is moderate aortic stenosis with a mean gradient of 23 mm Hg, and a peak gradient of 40 mm Hg..  · Mild aortic valve regurgitation is present.  · Mild mitral valve regurgitation is present  · The calculated RVSP is 26 mm Hg (normal).  · There is no evidence of pericardial effusion      ASSESSMENT:      Diagnosis Plan   1. Atrial fibrillation with RVR (CMS/HCC)     2. Nonrheumatic aortic valve stenosis     3. S/P TAVR (transcatheter aortic valve replacement)           PLAN OF CARE:     1.  Atrial fibrillation with RVR: Patient was advised to stop Eliquis secondary to hematuria needed to be evaluated.  Unfortunately patient also stopped digoxin and diltiazem.  Today in the office her heart rate is elevated at 130 irregularly irregular.  I reviewed her medications with her and made a list informing her to continue digoxin and diltiazem was exam.  She has a scheduled cystoscopy for November 17, 2018.  I informed her to hold Plavix 4 days prior to procedure.  She was also given samples of Eliquis 2.5 mg in the office which is to be started after her cystoscopy.  This was all written down and given to patient.   She was also advised to come in in 2 days for a repeat ECG.    2.  Aortic valve stenosis: s/p TAVR    3.  S/p TAVR: Patient states that her breathing and symptoms have improved since procedure.  Denies complaints.    4.  Follow-up in 2 days for repeat ECG.  Follow-up with Dr. Marie in one month.    Atrial Fibrillation and Atrial Flutter  Assessment  • The patient has paroxysmal atrial fibrillation  • The patient's CHADS2-VASc score is 4  • A [id number] score of 2 or more is considered a high risk for a thromboembolic event    Plan  • Attempt to maintain sinus rhythm  • Add apixaban for antithrombotic therapy  • Continue diltiazem for rhythm control  • Continue diltiazem and digoxin for rate control      Thank you for allowing me to participate in the care of your patient,      Sincerely,   Terrie Tierney APRPRESLEY  [facility]  11/07/18  11:05 AM

## 2018-11-09 ENCOUNTER — TELEPHONE (OUTPATIENT)
Dept: CARDIOLOGY | Facility: CLINIC | Age: 83
End: 2018-11-09

## 2018-11-09 NOTE — TELEPHONE ENCOUNTER
11/09/18  3:43 PM    Left message for patient to discuss cystoscopy.  I have had discussion with  in regards to cystoscopy and he would recommend patient wait till at least 6 months after TAVR to reduce risk of infection and the valve.    Spoke with Jonelle in triage at [facility], Dr. Navarrete who has cancelled cystoscopy.       Kym Tierney, LINDA  [facility]

## 2018-11-14 ENCOUNTER — DOCUMENTATION (OUTPATIENT)
Dept: TELEMETRY | Facility: HOSPITAL | Age: 83
End: 2018-11-14

## 2018-11-14 ENCOUNTER — TELEPHONE (OUTPATIENT)
Dept: CARDIOLOGY | Facility: CLINIC | Age: 83
End: 2018-11-14

## 2018-11-14 NOTE — TELEPHONE ENCOUNTER
Pt called questioning why her appointment with Urologist was cancelled. Informed per Dr. Sosa requested to wait 6 months after TAVR. Pt understood information but questions if she should be taking Eliquis? Pt also states she is severely SOB, no chest pain, fatigue, or dizziness. Please advise. Thanks, Ike

## 2018-11-14 NOTE — PROGRESS NOTES
"Mrs. Vargas's daughter contacted me stating her mother \"sounds terrible, is out of breath, has a rapid heartbeat\" and that she was very worried about her.  I called the patient and she confirmed that is weak, has REYES, palpitations and \"can't walk across the floor.\"  I asked if anything made the symptoms worse or better and she said she gets exhausted walking to the bathroom and sometimes feels like something is sitting on her chest.\"   Dr. Marie recommended that she come to the hospital to be admitted.  The patient insists that she cannot come in until tomorrow.  Dr. Marie will arrange for a bed and the hospital will call Mrs. Vargas in the morning and tell her when/where to come.  Mrs. Vargas is aware and agrees.  I also communicated this information to her daughter.               "

## 2018-11-15 ENCOUNTER — APPOINTMENT (OUTPATIENT)
Dept: GENERAL RADIOLOGY | Facility: HOSPITAL | Age: 83
End: 2018-11-15

## 2018-11-15 ENCOUNTER — APPOINTMENT (OUTPATIENT)
Dept: CARDIOLOGY | Facility: HOSPITAL | Age: 83
End: 2018-11-15
Attending: INTERNAL MEDICINE

## 2018-11-15 ENCOUNTER — HOSPITAL ENCOUNTER (OUTPATIENT)
Facility: HOSPITAL | Age: 83
Setting detail: OBSERVATION
Discharge: HOME | End: 2018-11-17
Attending: INTERNAL MEDICINE | Admitting: INTERNAL MEDICINE

## 2018-11-15 PROBLEM — I48.91 ATRIAL FIBRILLATION: Status: ACTIVE | Noted: 2018-11-15

## 2018-11-15 LAB
ANION GAP SERPL CALCULATED.3IONS-SCNC: 12.9 MMOL/L
APTT PPP: 28.3 SECONDS (ref 22.7–35.4)
BUN BLD-MCNC: 11 MG/DL (ref 8–23)
BUN/CREAT SERPL: 11.1 (ref 7–25)
CALCIUM SPEC-SCNC: 9.5 MG/DL (ref 8.6–10.5)
CHLORIDE SERPL-SCNC: 102 MMOL/L (ref 98–107)
CO2 SERPL-SCNC: 27.1 MMOL/L (ref 22–29)
CREAT BLD-MCNC: 0.99 MG/DL (ref 0.57–1)
DEPRECATED RDW RBC AUTO: 43.2 FL (ref 37–54)
DIGOXIN SERPL-MCNC: 1.2 NG/ML (ref 0.6–1.2)
ERYTHROCYTE [DISTWIDTH] IN BLOOD BY AUTOMATED COUNT: 13 % (ref 11.7–13)
GFR SERPL CREATININE-BSD FRML MDRD: 54 ML/MIN/1.73
GLUCOSE BLD-MCNC: 160 MG/DL (ref 65–99)
HCT VFR BLD AUTO: 37.4 % (ref 35.6–45.5)
HGB BLD-MCNC: 12.2 G/DL (ref 11.9–15.5)
INR PPP: 1.12 (ref 0.9–1.1)
MAGNESIUM SERPL-MCNC: 2.1 MG/DL (ref 1.6–2.4)
MCH RBC QN AUTO: 29.8 PG (ref 26.9–32)
MCHC RBC AUTO-ENTMCNC: 32.6 G/DL (ref 32.4–36.3)
MCV RBC AUTO: 91.4 FL (ref 80.5–98.2)
NT-PROBNP SERPL-MCNC: 1036 PG/ML (ref 0–1800)
PLATELET # BLD AUTO: 257 10*3/MM3 (ref 140–500)
PMV BLD AUTO: 8.2 FL (ref 6–12)
POTASSIUM BLD-SCNC: 3.8 MMOL/L (ref 3.5–5.2)
PROTHROMBIN TIME: 14.2 SECONDS (ref 11.7–14.2)
RBC # BLD AUTO: 4.09 10*6/MM3 (ref 3.9–5.2)
SODIUM BLD-SCNC: 142 MMOL/L (ref 136–145)
TROPONIN T SERPL-MCNC: <0.01 NG/ML (ref 0–0.03)
WBC NRBC COR # BLD: 6.05 10*3/MM3 (ref 4.5–10.7)

## 2018-11-15 PROCEDURE — 85730 THROMBOPLASTIN TIME PARTIAL: CPT | Performed by: INTERNAL MEDICINE

## 2018-11-15 PROCEDURE — 93306 TTE W/DOPPLER COMPLETE: CPT | Performed by: INTERNAL MEDICINE

## 2018-11-15 PROCEDURE — G0378 HOSPITAL OBSERVATION PER HR: HCPCS

## 2018-11-15 PROCEDURE — 93010 ELECTROCARDIOGRAM REPORT: CPT | Performed by: INTERNAL MEDICINE

## 2018-11-15 PROCEDURE — 71046 X-RAY EXAM CHEST 2 VIEWS: CPT

## 2018-11-15 PROCEDURE — 99219 PR INITIAL OBSERVATION CARE/DAY 50 MINUTES: CPT | Performed by: INTERNAL MEDICINE

## 2018-11-15 PROCEDURE — 83880 ASSAY OF NATRIURETIC PEPTIDE: CPT | Performed by: INTERNAL MEDICINE

## 2018-11-15 PROCEDURE — 93306 TTE W/DOPPLER COMPLETE: CPT

## 2018-11-15 PROCEDURE — 80162 ASSAY OF DIGOXIN TOTAL: CPT | Performed by: INTERNAL MEDICINE

## 2018-11-15 PROCEDURE — 85027 COMPLETE CBC AUTOMATED: CPT | Performed by: INTERNAL MEDICINE

## 2018-11-15 PROCEDURE — 83735 ASSAY OF MAGNESIUM: CPT | Performed by: INTERNAL MEDICINE

## 2018-11-15 PROCEDURE — 25010000002 FUROSEMIDE PER 20 MG: Performed by: INTERNAL MEDICINE

## 2018-11-15 PROCEDURE — 80048 BASIC METABOLIC PNL TOTAL CA: CPT | Performed by: INTERNAL MEDICINE

## 2018-11-15 PROCEDURE — 85610 PROTHROMBIN TIME: CPT | Performed by: INTERNAL MEDICINE

## 2018-11-15 PROCEDURE — 93005 ELECTROCARDIOGRAM TRACING: CPT | Performed by: INTERNAL MEDICINE

## 2018-11-15 PROCEDURE — 84484 ASSAY OF TROPONIN QUANT: CPT | Performed by: INTERNAL MEDICINE

## 2018-11-15 RX ORDER — DEXTROSE MONOHYDRATE 25 G/50ML
25 INJECTION, SOLUTION INTRAVENOUS
Status: DISCONTINUED | OUTPATIENT
Start: 2018-11-15 | End: 2018-11-15

## 2018-11-15 RX ORDER — DIGOXIN 250 MCG
250 TABLET ORAL
Status: DISCONTINUED | OUTPATIENT
Start: 2018-11-15 | End: 2018-11-17 | Stop reason: HOSPADM

## 2018-11-15 RX ORDER — SODIUM CHLORIDE 0.9 % (FLUSH) 0.9 %
3-10 SYRINGE (ML) INJECTION AS NEEDED
Status: DISCONTINUED | OUTPATIENT
Start: 2018-11-15 | End: 2018-11-17 | Stop reason: HOSPADM

## 2018-11-15 RX ORDER — ACETAMINOPHEN 325 MG/1
650 TABLET ORAL EVERY 4 HOURS PRN
Status: DISCONTINUED | OUTPATIENT
Start: 2018-11-15 | End: 2018-11-17 | Stop reason: HOSPADM

## 2018-11-15 RX ORDER — ASPIRIN 81 MG/1
81 TABLET ORAL DAILY
Status: DISCONTINUED | OUTPATIENT
Start: 2018-11-15 | End: 2018-11-17 | Stop reason: HOSPADM

## 2018-11-15 RX ORDER — SODIUM CHLORIDE 0.9 % (FLUSH) 0.9 %
3 SYRINGE (ML) INJECTION EVERY 12 HOURS SCHEDULED
Status: DISCONTINUED | OUTPATIENT
Start: 2018-11-15 | End: 2018-11-17 | Stop reason: HOSPADM

## 2018-11-15 RX ORDER — DILTIAZEM HYDROCHLORIDE 180 MG/1
180 CAPSULE, COATED, EXTENDED RELEASE ORAL
Status: DISCONTINUED | OUTPATIENT
Start: 2018-11-15 | End: 2018-11-17 | Stop reason: HOSPADM

## 2018-11-15 RX ORDER — NICOTINE POLACRILEX 4 MG
15 LOZENGE BUCCAL
Status: DISCONTINUED | OUTPATIENT
Start: 2018-11-15 | End: 2018-11-15

## 2018-11-15 RX ORDER — FUROSEMIDE 10 MG/ML
20 INJECTION INTRAMUSCULAR; INTRAVENOUS ONCE
Status: COMPLETED | OUTPATIENT
Start: 2018-11-15 | End: 2018-11-15

## 2018-11-15 RX ORDER — CLOPIDOGREL BISULFATE 75 MG/1
75 TABLET ORAL DAILY
Status: DISCONTINUED | OUTPATIENT
Start: 2018-11-15 | End: 2018-11-17 | Stop reason: HOSPADM

## 2018-11-15 RX ADMIN — Medication 3 ML: at 15:12

## 2018-11-15 RX ADMIN — FUROSEMIDE 20 MG: 10 INJECTION, SOLUTION INTRAMUSCULAR; INTRAVENOUS at 17:00

## 2018-11-15 RX ADMIN — Medication 3 ML: at 20:43

## 2018-11-15 NOTE — H&P
Cardiology History & Physical / Consultation      Patient Name: Tequila Vargas  Age/Sex: 83 y.o. female  : 1935  MRN: [id number]    Date of Admission: 11/15/2018  Date of Encounter Visit: 11/15/18  Encounter Provider: Althea Baumann RN  Referring Provider: Benedicto Marie MD  Place of Service: [facility] CARDIOLOGY  Patient Care Team:  Richard Carter MD as PCP - General  Gian Massey MD as PCP - Claims Attributed  Benedicto Marie MD as Consulting Physician (Cardiology)          Subjective:     Chief Complaint: Atrial fibrillation    History of Present Illness:  Tequila Vargas is a     83-year-old female who is usually followed by Dr. Marie.  Patient had a history of rheumatic fever and aortic stenosis.  She underwent a TAVR on 10/30/18.  She had some hematuria show she was not anticoagulated.  Initially she was feeling better but then got more short of breath.  She was also complaining of some tachycardi  , palpitations.  She was seen in the office was found to be in atrial fibrillation with a rapid rate.  Medicines were adjusted but she continued to have problems.  She contacted our office saying that she progressively had worsened.  Upon evaluation patient said she got short of breath when she walks stairs or exerting herself.  She said sometimes when she laid flat she may felt a little short of breath.  Her echo post have her was not really remarkable.  Patient was resting comfortably.  She was in atrial fibrillation her heart rate was in the 80s when I saw her and she was in no apparent distress.        Previous Cardiac Testing  ECHO 10/31/2018  · Left ventricular systolic function is normal. Calculated EF = 60.8%. Estimated EF was in agreement with the calculated EF. Estimated EF = 61%. Normal left ventricular cavity size noted. All left ventricular wall segments contract normally. Left ventricular wall thickness is consistent with  mild concentric hypertrophy. Left ventricular diastolic function was indeterminate.  · Left atrial cavity size is dilated. Left atrial volume is moderately increased.  · Right atrial cavity size is borderline dilated.  · The aortic valve is not well visualized. There is a prosthetic aortic valve. There is a TAVR valve present. The aortic valve peak and mean gradients are within defined limits. Trivial perivalvular regurgitation is present. There is no significant prosthetic valve stenosis. The inferior aspect of the aortic valve prosthesis is slightly visualized the distal LV outflow tract.  · Moderate MAC is present. Trace mitral valve regurgitation is present.  · Moderate tricuspid valve regurgitation is present. Estimated right ventricular systolic pressure from tricuspid regurgitation is mildly elevated (35-45 mmHg). Calculated right ventricular systolic pressure from tricuspid regurgitation is 37 mmHg.     Cardiac Catheterization 10/21/2018  FINDINGS:     1. HEMODYNAMICS:  RA -/7/5, RV 32/6, PA 32/13/21, PCWP -/20/13, /11, AO 89/63/72.  The peak AoV gradient was 65, mean of 40.  The cardiac output was 5.3 L/min.     2. LEFT VENTRICULOGRAPHY: EF 60%, no mitral regurgitation.     3. CORONARY ANGIOGRAPHY:  Right dominant system, mild coronary disease.  The left main is normal.  The proximal LAD has 10% stenosis.  The mid LAD has 10% stenosis.  The circumflex has 30% proximal stenosis.  The RCA is normal.     SUMMARY: Severe aortic stenosis.     RECOMMENDATIONS: Proceed with surgical evaluation for SAVR versus TAVR    Past Medical History:  Past Medical History:   Diagnosis Date   • Aortic stenosis     Moderate by echo 11/4/16 (mean gradient 23 mm Hg, peak gradient 40 mm Hg).   • Chronic back pain    • Colon polyps    • Murmur    • Osteoporosis    • Rheumatic fever     as a child   • Seasonal allergies    • Spinal stenosis    • Squamous cell carcinoma     NOSE       Past Surgical History:   Procedure  Laterality Date   • APPENDECTOMY     • COLONOSCOPY     • ELBOW ARTHROPLASTY Right    • HAND SURGERY Right     excision of tendon cyst; ganglion cyst removal   • HYSTERECTOMY      s/p partial hysterectomy (one ovary intact - although she does not know which one).   • MYRINGOTOMY Left     WITH TUBE PLACEMENT. SINCE REMOVED   • TOTAL KNEE ARTHROPLASTY Right    • TOTAL KNEE ARTHROPLASTY REVISION Right        Home Medications:   Medications Prior to Admission   Medication Sig Dispense Refill Last Dose   • aspirin 81 MG EC tablet Take 1 tablet by mouth Daily. 30 tablet 1 Taking   • clopidogrel (PLAVIX) 75 MG tablet Take 1 tablet by mouth Daily. Follow-up with cardiology for refill. 30 tablet 0 Taking   • digoxin (LANOXIN) 250 MCG tablet Take 1 tablet by mouth Daily. 30 tablet 11 Not Taking   • diltiaZEM CD (CARDIZEM CD) 180 MG 24 hr capsule Take 1 capsule by mouth Daily. 30 capsule 11 Not Taking       Allergies:  No Known Allergies    Past Social History:  Social History     Socioeconomic History   • Marital status:      Spouse name: Not on file   • Number of children: Not on file   • Years of education: Not on file   • Highest education level: Not on file   Social Needs   • Financial resource strain: Not on file   • Food insecurity - worry: Not on file   • Food insecurity - inability: Not on file   • Transportation needs - medical: Not on file   • Transportation needs - non-medical: Not on file   Occupational History   • Not on file   Tobacco Use   • Smoking status: Never Smoker   • Smokeless tobacco: Never Used   Substance and Sexual Activity   • Alcohol use: No     Comment: no caffeine   • Drug use: No   • Sexual activity: Not on file   Other Topics Concern   • Not on file   Social History Narrative   • Not on file       Past Family History: History reviewed. No pertinent family history.   Family History   Problem Relation Age of Onset   • Colon cancer Father    • Heart failure Father    • Heart disease Sister          s/p valve replacement (valve unknown)   • Colon cancer Brother    • Malig Hyperthermia Neg Hx        Review of Systems   Constitutional: Positive for fatigue.   Respiratory: Positive for shortness of breath.    All other systems reviewed and are negative.          Objective:     Objective:  BP: ()/()   Arterial Line BP: ()/()   No intake or output data in the 24 hours ending 11/15/18 1155  There is no height or weight on file to calculate BMI.  There were no vitals filed for this visit.        Physical Exam:   Physical Exam   Constitutional: She is oriented to person, place, and time. She appears well-developed.   HENT:   Head: Normocephalic.   Eyes: Conjunctivae are normal.   Neck: Normal range of motion.   Cardiovascular: Normal rate and normal heart sounds. An irregularly irregular rhythm present.   Pulmonary/Chest: Breath sounds normal.   Abdominal: Soft. Bowel sounds are normal.   Musculoskeletal: Normal range of motion. She exhibits no edema.   Neurological: She is alert and oriented to person, place, and time.   Skin: Skin is warm and dry.   Psychiatric: She has a normal mood and affect. Her behavior is normal.   Vitals reviewed.       Labs:   Lab Review:                                               Telemetry      PREVIOUS EKG:            Assessment:       * No active hospital problems. *        Plan:     1.  Atrial fibrillation.  Patient was in a rapid ventricular response as an outpatient but is currently well controlled.  2.  TAVR will repeat echo just to make sure nothing change.  3.  Shortness of breath she really looked comfortable and was not what was described to Dr. Marie over the phone.  We'll give her a little bit of Lasix and see what evolves.    Thank you for allowing me to participate in the care of Tequila SHAVONNE Vargas. Feel free to contact me directly with any further questions or concerns.    Althea Baumann RN  [facility]  11/15/18  11:55 AM     Deonte HARRIS  MD Denise  [facility]  11/15/2018 4:02 PM

## 2018-11-16 ENCOUNTER — APPOINTMENT (OUTPATIENT)
Dept: CT IMAGING | Facility: HOSPITAL | Age: 83
End: 2018-11-16

## 2018-11-16 LAB
ANION GAP SERPL CALCULATED.3IONS-SCNC: 13.8 MMOL/L
BH CV ECHO MEAS - AO MAX PG (FULL): 13.3 MMHG
BH CV ECHO MEAS - AO MAX PG: 16.6 MMHG
BH CV ECHO MEAS - AO MEAN PG (FULL): 8 MMHG
BH CV ECHO MEAS - AO MEAN PG: 10 MMHG
BH CV ECHO MEAS - AO ROOT AREA (BSA CORRECTED): 1.7
BH CV ECHO MEAS - AO ROOT AREA: 5.7 CM^2
BH CV ECHO MEAS - AO ROOT DIAM: 2.7 CM
BH CV ECHO MEAS - AO V2 MAX: 204 CM/SEC
BH CV ECHO MEAS - AO V2 MEAN: 148 CM/SEC
BH CV ECHO MEAS - AO V2 VTI: 34.4 CM
BH CV ECHO MEAS - AVA(I,A): 1.3 CM^2
BH CV ECHO MEAS - AVA(I,D): 1.3 CM^2
BH CV ECHO MEAS - AVA(V,A): 1.3 CM^2
BH CV ECHO MEAS - AVA(V,D): 1.3 CM^2
BH CV ECHO MEAS - BSA(HAYCOCK): 1.5 M^2
BH CV ECHO MEAS - BSA: 1.6 M^2
BH CV ECHO MEAS - BZI_BMI: 20.1 KILOGRAMS/M^2
BH CV ECHO MEAS - BZI_METRIC_HEIGHT: 162.6 CM
BH CV ECHO MEAS - BZI_METRIC_WEIGHT: 53.1 KG
BH CV ECHO MEAS - EDV(CUBED): 59.3 ML
BH CV ECHO MEAS - EDV(MOD-SP2): 33 ML
BH CV ECHO MEAS - EDV(MOD-SP4): 60 ML
BH CV ECHO MEAS - EDV(TEICH): 65.9 ML
BH CV ECHO MEAS - EF(CUBED): 76.7 %
BH CV ECHO MEAS - EF(MOD-BP): 63 %
BH CV ECHO MEAS - EF(MOD-SP2): 63.6 %
BH CV ECHO MEAS - EF(MOD-SP4): 61.7 %
BH CV ECHO MEAS - EF(TEICH): 69.4 %
BH CV ECHO MEAS - ESV(CUBED): 13.8 ML
BH CV ECHO MEAS - ESV(MOD-SP2): 12 ML
BH CV ECHO MEAS - ESV(MOD-SP4): 23 ML
BH CV ECHO MEAS - ESV(TEICH): 20.2 ML
BH CV ECHO MEAS - FS: 38.5 %
BH CV ECHO MEAS - IVS/LVPW: 0.67
BH CV ECHO MEAS - IVSD: 0.8 CM
BH CV ECHO MEAS - LAT PEAK E' VEL: 11 CM/SEC
BH CV ECHO MEAS - LV DIASTOLIC VOL/BSA (35-75): 38.5 ML/M^2
BH CV ECHO MEAS - LV MASS(C)D: 122.1 GRAMS
BH CV ECHO MEAS - LV MASS(C)DI: 78.4 GRAMS/M^2
BH CV ECHO MEAS - LV MAX PG: 3.3 MMHG
BH CV ECHO MEAS - LV MEAN PG: 2 MMHG
BH CV ECHO MEAS - LV SYSTOLIC VOL/BSA (12-30): 14.8 ML/M^2
BH CV ECHO MEAS - LV V1 MAX: 90.9 CM/SEC
BH CV ECHO MEAS - LV V1 MEAN: 61.2 CM/SEC
BH CV ECHO MEAS - LV V1 VTI: 16.1 CM
BH CV ECHO MEAS - LVIDD: 3.9 CM
BH CV ECHO MEAS - LVIDS: 2.4 CM
BH CV ECHO MEAS - LVLD AP2: 5.4 CM
BH CV ECHO MEAS - LVLD AP4: 6.5 CM
BH CV ECHO MEAS - LVLS AP2: 4.5 CM
BH CV ECHO MEAS - LVLS AP4: 5.6 CM
BH CV ECHO MEAS - LVOT AREA (M): 2.8 CM^2
BH CV ECHO MEAS - LVOT AREA: 2.8 CM^2
BH CV ECHO MEAS - LVOT DIAM: 1.9 CM
BH CV ECHO MEAS - LVPWD: 1.2 CM
BH CV ECHO MEAS - MED PEAK E' VEL: 10 CM/SEC
BH CV ECHO MEAS - MV A MAX VEL: 30.6 CM/SEC
BH CV ECHO MEAS - MV DEC SLOPE: 647 CM/SEC^2
BH CV ECHO MEAS - MV DEC TIME: 0.09 SEC
BH CV ECHO MEAS - MV E MAX VEL: 112 CM/SEC
BH CV ECHO MEAS - MV E/A: 3.7
BH CV ECHO MEAS - MV MAX PG: 8.4 MMHG
BH CV ECHO MEAS - MV MEAN PG: 2 MMHG
BH CV ECHO MEAS - MV P1/2T MAX VEL: 146 CM/SEC
BH CV ECHO MEAS - MV P1/2T: 66.1 MSEC
BH CV ECHO MEAS - MV V2 MAX: 145 CM/SEC
BH CV ECHO MEAS - MV V2 MEAN: 56.7 CM/SEC
BH CV ECHO MEAS - MV V2 VTI: 36.9 CM
BH CV ECHO MEAS - MVA P1/2T LCG: 1.5 CM^2
BH CV ECHO MEAS - MVA(P1/2T): 3.3 CM^2
BH CV ECHO MEAS - MVA(VTI): 1.2 CM^2
BH CV ECHO MEAS - PA ACC TIME: 0.1 SEC
BH CV ECHO MEAS - PA MAX PG (FULL): 1.5 MMHG
BH CV ECHO MEAS - PA MAX PG: 2.5 MMHG
BH CV ECHO MEAS - PA PR(ACCEL): 34.5 MMHG
BH CV ECHO MEAS - PA V2 MAX: 78.5 CM/SEC
BH CV ECHO MEAS - PULM A REVS DUR: 0.08 SEC
BH CV ECHO MEAS - PULM A REVS VEL: 21.3 CM/SEC
BH CV ECHO MEAS - PULM DIAS VEL: 28.1 CM/SEC
BH CV ECHO MEAS - PULM S/D: 1.2
BH CV ECHO MEAS - PULM SYS VEL: 34.4 CM/SEC
BH CV ECHO MEAS - PVA(V,A): 1.8 CM^2
BH CV ECHO MEAS - PVA(V,D): 1.8 CM^2
BH CV ECHO MEAS - QP/QS: 0.59
BH CV ECHO MEAS - RAP SYSTOLE: 3 MMHG
BH CV ECHO MEAS - RV MAX PG: 1 MMHG
BH CV ECHO MEAS - RV MEAN PG: 1 MMHG
BH CV ECHO MEAS - RV V1 MAX: 50.2 CM/SEC
BH CV ECHO MEAS - RV V1 MEAN: 33.6 CM/SEC
BH CV ECHO MEAS - RV V1 VTI: 9.6 CM
BH CV ECHO MEAS - RVOT AREA: 2.8 CM^2
BH CV ECHO MEAS - RVOT DIAM: 1.9 CM
BH CV ECHO MEAS - RVSP: 27 MMHG
BH CV ECHO MEAS - SI(AO): 126.5 ML/M^2
BH CV ECHO MEAS - SI(CUBED): 29.2 ML/M^2
BH CV ECHO MEAS - SI(LVOT): 29.3 ML/M^2
BH CV ECHO MEAS - SI(MOD-SP2): 13.5 ML/M^2
BH CV ECHO MEAS - SI(MOD-SP4): 23.8 ML/M^2
BH CV ECHO MEAS - SI(TEICH): 29.4 ML/M^2
BH CV ECHO MEAS - SV(AO): 197 ML
BH CV ECHO MEAS - SV(CUBED): 45.5 ML
BH CV ECHO MEAS - SV(LVOT): 45.6 ML
BH CV ECHO MEAS - SV(MOD-SP2): 21 ML
BH CV ECHO MEAS - SV(MOD-SP4): 37 ML
BH CV ECHO MEAS - SV(RVOT): 27.1 ML
BH CV ECHO MEAS - SV(TEICH): 45.8 ML
BH CV ECHO MEAS - TAPSE (>1.6): 2.2 CM2
BH CV ECHO MEAS - TR MAX VEL: 246 CM/SEC
BH CV ECHO MEASUREMENTS AVERAGE E/E' RATIO: 10.67
BH CV VAS BP LEFT ARM: NORMAL MMHG
BH CV XLRA - RV BASE: 3.7 CM
BH CV XLRA - TDI S': 12 CM/SEC
BUN BLD-MCNC: 15 MG/DL (ref 8–23)
BUN/CREAT SERPL: 18.5 (ref 7–25)
CALCIUM SPEC-SCNC: 9.1 MG/DL (ref 8.6–10.5)
CHLORIDE SERPL-SCNC: 101 MMOL/L (ref 98–107)
CO2 SERPL-SCNC: 26.2 MMOL/L (ref 22–29)
CREAT BLD-MCNC: 0.81 MG/DL (ref 0.57–1)
GFR SERPL CREATININE-BSD FRML MDRD: 68 ML/MIN/1.73
GLUCOSE BLD-MCNC: 93 MG/DL (ref 65–99)
LEFT ATRIUM VOLUME INDEX: 28 ML/M2
MAXIMAL PREDICTED HEART RATE: 139 BPM
POTASSIUM BLD-SCNC: 4.1 MMOL/L (ref 3.5–5.2)
SODIUM BLD-SCNC: 141 MMOL/L (ref 136–145)
STRESS TARGET HR: 118 BPM

## 2018-11-16 PROCEDURE — 96376 TX/PRO/DX INJ SAME DRUG ADON: CPT

## 2018-11-16 PROCEDURE — G0378 HOSPITAL OBSERVATION PER HR: HCPCS

## 2018-11-16 PROCEDURE — A9270 NON-COVERED ITEM OR SERVICE: HCPCS | Performed by: INTERNAL MEDICINE

## 2018-11-16 PROCEDURE — 0 IOPAMIDOL PER 1 ML: Performed by: INTERNAL MEDICINE

## 2018-11-16 PROCEDURE — 63710000001 DILTIAZEM CD 180 MG CAPSULE SUSTAINED-RELEASE 24 HR: Performed by: INTERNAL MEDICINE

## 2018-11-16 PROCEDURE — 96374 THER/PROPH/DIAG INJ IV PUSH: CPT

## 2018-11-16 PROCEDURE — 99225 PR SBSQ OBSERVATION CARE/DAY 25 MINUTES: CPT | Performed by: INTERNAL MEDICINE

## 2018-11-16 PROCEDURE — 71275 CT ANGIOGRAPHY CHEST: CPT

## 2018-11-16 PROCEDURE — 25010000002 FUROSEMIDE PER 20 MG: Performed by: INTERNAL MEDICINE

## 2018-11-16 PROCEDURE — 63710000001 ASPIRIN 81 MG TABLET DELAYED-RELEASE: Performed by: INTERNAL MEDICINE

## 2018-11-16 PROCEDURE — 80048 BASIC METABOLIC PNL TOTAL CA: CPT | Performed by: INTERNAL MEDICINE

## 2018-11-16 PROCEDURE — 94799 UNLISTED PULMONARY SVC/PX: CPT

## 2018-11-16 PROCEDURE — 63710000001 POTASSIUM CHLORIDE 10 MEQ CAPSULE CONTROLLED-RELEASE: Performed by: INTERNAL MEDICINE

## 2018-11-16 PROCEDURE — 63710000001 CLOPIDOGREL 75 MG TABLET: Performed by: INTERNAL MEDICINE

## 2018-11-16 RX ORDER — POTASSIUM CHLORIDE 750 MG/1
20 CAPSULE, EXTENDED RELEASE ORAL ONCE
Status: COMPLETED | OUTPATIENT
Start: 2018-11-16 | End: 2018-11-16

## 2018-11-16 RX ORDER — FUROSEMIDE 10 MG/ML
20 INJECTION INTRAMUSCULAR; INTRAVENOUS EVERY 12 HOURS
Status: COMPLETED | OUTPATIENT
Start: 2018-11-16 | End: 2018-11-16

## 2018-11-16 RX ADMIN — Medication 3 ML: at 20:56

## 2018-11-16 RX ADMIN — DIGOXIN 250 MCG: 250 TABLET ORAL at 13:20

## 2018-11-16 RX ADMIN — Medication 3 ML: at 08:47

## 2018-11-16 RX ADMIN — DILTIAZEM HYDROCHLORIDE 180 MG: 180 CAPSULE, COATED, EXTENDED RELEASE ORAL at 08:44

## 2018-11-16 RX ADMIN — IOPAMIDOL 95 ML: 755 INJECTION, SOLUTION INTRAVENOUS at 11:30

## 2018-11-16 RX ADMIN — CLOPIDOGREL 75 MG: 75 TABLET, FILM COATED ORAL at 08:45

## 2018-11-16 RX ADMIN — POTASSIUM CHLORIDE 20 MEQ: 750 CAPSULE, EXTENDED RELEASE ORAL at 10:45

## 2018-11-16 RX ADMIN — FUROSEMIDE 20 MG: 10 INJECTION, SOLUTION INTRAMUSCULAR; INTRAVENOUS at 10:45

## 2018-11-16 RX ADMIN — FUROSEMIDE 20 MG: 10 INJECTION, SOLUTION INTRAMUSCULAR; INTRAVENOUS at 21:03

## 2018-11-16 RX ADMIN — ASPIRIN 81 MG: 81 TABLET, DELAYED RELEASE ORAL at 08:46

## 2018-11-16 NOTE — PROGRESS NOTES
Discharge Planning Assessment  [facility]     Patient Name: Tequila Vargas  MRN: [id number]  Today's Date: 11/16/2018    Admit Date: 11/15/2018    Discharge Needs Assessment     Row Name 11/16/18 1508       Living Environment    Lives With  spouse    Name(s) of Who Lives With Patient  Loy Vargas    Current Living Arrangements  home/apartment/condo    Duration at Residence  28 years    Primary Care Provided by  self    Provides Primary Care For  no one    Family Caregiver if Needed  spouse    Quality of Family Relationships  helpful;supportive;involved    Able to Return to Prior Arrangements  yes       Resource/Environmental Concerns    Resource/Environmental Concerns  none       Transition Planning    Patient/Family Anticipates Transition to  home with family    Patient/Family Anticipated Services at Transition  none    Transportation Anticipated  family or friend will provide       Discharge Needs Assessment    Readmission Within the Last 30 Days  no previous admission in last 30 days    Concerns to be Addressed  no discharge needs identified;denies needs/concerns at this time    Equipment Currently Used at Home  none        Discharge Plan     Row Name 11/16/18 0669       Plan    Plan  Home;  Denies needs.    Patient/Family in Agreement with Plan  yes    Plan Comments  Spoke with Pt and spouse Loy Vargas ([phone]) at bedside.  CCP introduced self and role.  Pt confirmed information on face sheet.  Pt stated she is IADL'S, retired & drives.  Pt lives in a house with three entrance steps with her spouse.  Pt denies past home health, subacute rehab and DME.  Pt confirms pharmacy as [facility].  Pt denies issues with affording her medications.  Pt plans to return home at discharge and denies needs at this time.  CCP will continue to follow…ANABELLA RN/CCP        Destination      No service coordination in this encounter.      Durable Medical Equipment      No service coordination in this  encounter.      Dialysis/Infusion      No service coordination in this encounter.      Home Medical Care      No service coordination in this encounter.      Community Resources      No service coordination in this encounter.          Demographic Summary     Row Name 11/16/18 1505       General Information    Admission Type  observation    Arrived From  home    Required Notices Provided  Observation Status Notice    Referral Source  admission list    Reason for Consult  discharge planning    Preferred Language  English     Used During This Interaction  no        Functional Status     Row Name 11/16/18 1505       Functional Status    Usual Activity Tolerance  good    Current Activity Tolerance  good       Functional Status, IADL    Medications  independent    Meal Preparation  independent    Housekeeping  independent    Laundry  independent    Shopping  independent       Mental Status    General Appearance WDL  WDL       Mental Status Summary    Recent Changes in Mental Status/Cognitive Functioning  no changes       Employment/    Employment Status  retired        Psychosocial    No documentation.       Abuse/Neglect     Row Name 11/16/18 1506       Personal Safety    Feels Unsafe at Home or Work/School  no    Feels Threatened by Someone  no    Does Anyone Try to Keep You From Having Contact with Others or Doing Things Outside Your Home?  no    Physical Signs of Abuse Present  no        Legal    No documentation.       Substance Abuse    No documentation.       Patient Forms    No documentation.           Michelle Gaxiola RN

## 2018-11-16 NOTE — PLAN OF CARE
Problem: Patient Care Overview  Goal: Plan of Care Review  Outcome: Ongoing (interventions implemented as appropriate)   11/16/18 0513   Coping/Psychosocial   Plan of Care Reviewed With patient   Plan of Care Review   Progress no change   OTHER   Outcome Summary VSS. A-fib. RA. SCD's in use. ECHO 11/15. No complaints of pain or discomfort. Will continue to monitor.

## 2018-11-16 NOTE — PLAN OF CARE
Problem: Patient Care Overview  Goal: Plan of Care Review  Outcome: Ongoing (interventions implemented as appropriate)   11/16/18 1538   Coping/Psychosocial   Plan of Care Reviewed With patient   Plan of Care Review   Progress improving   OTHER   Outcome Summary Pt VSS. Afib, rate 70-80s with rest, improving to a heart rate of 106 with activity this afternoon. No complaints of pain. Early in shift complained of some SOA. Feeling better after 1.5L diuresis. Will continue to monitor.        Problem: Arrhythmia/Dysrhythmia (Symptomatic) (Adult)  Goal: Signs and Symptoms of Listed Potential Problems Will be Absent, Minimized or Managed (Arrhythmia/Dysrhythmia)  Outcome: Ongoing (interventions implemented as appropriate)   11/16/18 1538   Goal/Outcome Evaluation   Problems Assessed (Arrhythmia/Dysrhythmia) all   Problems Present (Dysrhythmia) electrophysiologic conduction defect       Problem: Fall Risk (Adult)  Goal: Identify Related Risk Factors and Signs and Symptoms  Outcome: Ongoing (interventions implemented as appropriate)   11/16/18 1538   Fall Risk (Adult)   Related Risk Factors (Fall Risk) other (see comments)  (Pt on diuretics with low BP)   Signs and Symptoms (Fall Risk) presence of risk factors

## 2018-11-16 NOTE — PROGRESS NOTES
LOS: 0 days   Patient Care Team:  Richard Carter MD as PCP - General  Gian Massey MD as PCP - Claims Attributed  Benedicto Marie MD as Consulting Physician (Cardiology)    Chief Complaint: Follow-up SOA, persistent atrial fibrillation, recent TAVR.    Interval History: Still SOA walking to bathroom.  HR does increase with ambulation - up to 118 while in halls, but come down after rest.  No chest pain.    Vital Signs:  Temp:  [97.8 °F (36.6 °C)-99 °F (37.2 °C)] 97.8 °F (36.6 °C)  Heart Rate:  [79-94] 79  Resp:  [16-18] 16  BP: (106-136)/(70-82) 106/70    Intake/Output Summary (Last 24 hours) at 11/16/2018 0935  Last data filed at 11/16/2018 0831  Gross per 24 hour   Intake 480 ml   Output --   Net 480 ml       Physical Exam:   General Appearance:    No acute distress, alert and oriented x4   Lungs:     Clear to auscultation bilaterally     Heart:    Irregularly irregular with normal rate.  No murmur.   Abdomen:     Soft, non-tender, non-distended.    Extremities:   Moves all extremities well.  No clubbing, cyanosis, or edema.     Results Review:    Results from last 7 days   Lab Units  11/16/18   0414   SODIUM mmol/L  141   POTASSIUM mmol/L  4.1   CHLORIDE mmol/L  101   CO2 mmol/L  26.2   BUN mg/dL  15   CREATININE mg/dL  0.81   GLUCOSE mg/dL  93   CALCIUM mg/dL  9.1     Results from last 7 days   Lab Units  11/15/18   1422   TROPONIN T ng/mL  <0.010     Results from last 7 days   Lab Units  11/15/18   1422   WBC 10*3/mm3  6.05   HEMOGLOBIN g/dL  12.2   HEMATOCRIT %  37.4   PLATELETS 10*3/mm3  257     Results from last 7 days   Lab Units  11/15/18   1422   INR   1.12*   APTT seconds  28.3         Results from last 7 days   Lab Units  11/15/18   1422   MAGNESIUM mg/dL  2.1           I reviewed the patient's new clinical results.        Assessment:  1. Shortness of breath with exertion  2. Status post TAVR 10/30/18 (for severe AS)  3. Persistent atrial fibrillation (diagnosed 10/18)  4. Normal EF  60-65%  5. Hematuria and Epistaxis while on Lovenox in past.    Plan:  -Echo reviewed - normal TAVR and normal normal EF.  -Her rate is largely controlled, although goes up with exertion (but comes down).  Will continue Cardizem CD and Digoxin.  -Etiology of SOA not clear - will check CTA of chest.  -Lasix 20 mg IV bid today and reassess response tomorrow AM.  -She had hematuria (severe) and epistaxis while on Lovenox in recent past.  I plan to start Eliquis 2.5 mg bid in future.  She has not had cystoscopy because of recent TAVR.  Will continue ASA and Plavix for now (unfortunately, this does not give her great stroke protection).    Benedicto Marie MD  11/16/18  9:35 AM

## 2018-11-17 VITALS
RESPIRATION RATE: 18 BRPM | BODY MASS INDEX: 19.55 KG/M2 | HEART RATE: 88 BPM | DIASTOLIC BLOOD PRESSURE: 82 MMHG | HEIGHT: 64 IN | OXYGEN SATURATION: 96 % | WEIGHT: 114.5 LBS | TEMPERATURE: 97.8 F | SYSTOLIC BLOOD PRESSURE: 117 MMHG

## 2018-11-17 LAB
ANION GAP SERPL CALCULATED.3IONS-SCNC: 12.6 MMOL/L
BUN BLD-MCNC: 18 MG/DL (ref 8–23)
BUN/CREAT SERPL: 21.2 (ref 7–25)
CALCIUM SPEC-SCNC: 9.4 MG/DL (ref 8.6–10.5)
CHLORIDE SERPL-SCNC: 97 MMOL/L (ref 98–107)
CO2 SERPL-SCNC: 26.4 MMOL/L (ref 22–29)
CREAT BLD-MCNC: 0.85 MG/DL (ref 0.57–1)
GFR SERPL CREATININE-BSD FRML MDRD: 64 ML/MIN/1.73
GLUCOSE BLD-MCNC: 95 MG/DL (ref 65–99)
POTASSIUM BLD-SCNC: 4.1 MMOL/L (ref 3.5–5.2)
SODIUM BLD-SCNC: 136 MMOL/L (ref 136–145)

## 2018-11-17 PROCEDURE — G0378 HOSPITAL OBSERVATION PER HR: HCPCS

## 2018-11-17 PROCEDURE — 80048 BASIC METABOLIC PNL TOTAL CA: CPT | Performed by: INTERNAL MEDICINE

## 2018-11-17 PROCEDURE — 99217 PR OBSERVATION CARE DISCHARGE MANAGEMENT: CPT | Performed by: NURSE PRACTITIONER

## 2018-11-17 RX ORDER — ASPIRIN 81 MG/1
81 TABLET ORAL DAILY
Qty: 90 TABLET | Refills: 3 | Status: SHIPPED | OUTPATIENT
Start: 2018-11-17

## 2018-11-17 RX ADMIN — CLOPIDOGREL 75 MG: 75 TABLET, FILM COATED ORAL at 09:30

## 2018-11-17 RX ADMIN — ASPIRIN 81 MG: 81 TABLET, DELAYED RELEASE ORAL at 09:30

## 2018-11-17 RX ADMIN — DILTIAZEM HYDROCHLORIDE 180 MG: 180 CAPSULE, COATED, EXTENDED RELEASE ORAL at 09:30

## 2018-11-17 NOTE — DISCHARGE SUMMARY
Hospital Discharge    Patient Name: Tequila Vargas  Age/Sex: 83 y.o. female  : 1935  MRN: [id number]    Encounter Provider: LINDA Rolon  Referring Provider: Benedicto Marie MD  Place of Service: [facility] CARDIOLOGY  Patient Care Team:  Richard Carter MD as PCP - General  Gian Massey MD as PCP - Claims Attributed  Benedicto Marie MD as Consulting Physician (Cardiology)         Date of Discharge:  2018   Date of Admit: 11/15/2018    Discharge Condition: Good  Discharge Diagnosis:    Atrial fibrillation (CMS/HCC)      Hospital Course:   Tequila Vargas is a 83 y.o. female who is status post TAVR on 10/30/18.  She had some significant hematuria and epistaxis postoperatively with loenox and was supposed to follow-up with urology.  An issue she was feeling better but then got more short of breath.  She was also complaining of tachycardia and palpitations.  She was seen in office and found to be in atrial fibrillation with a rapid rate.  She was admitted to the hospital on 11/15/18.  She had some med adjustments for her heart rate but she still progressively had worsening shortness of breath.   She had a CT angiogram of her chest which showed no acute pulmonary embolism but possible esophagitis, in which she report she does have intermittent GERD symptoms.  I will have her discuss her GERD/esophagitis with her PCP on an outpatient basis.  She was diuresed with a couple doses of IV diuretics and her breathing has improved.  She is feeling better in respect to her breathing and ready to go home today. She continues to be in atrial fibrillation with rate control with CCB and digoxin. She is on ASA and plavix s/p TAVR on 10/30/18. She has been unable to have cystoscopy and was advised to wait 6 months post TAVR per Dr. Sosa. Dr. Marie eventually wants her on Eliquis 2.5 mg BID for stroke prophylaxis when able.              Assessment/Plan:  1. Atrial fibrillation: Peristent. Newly dx on 10/18/18.  Rate controlled on Cardizem CD and Digoxin. Per Dr. Marie plan to start Eliquis 2.5 mg BID in the future. Currently on ASA/Plavix- needing a cystoscopy for severe hematuria on Lovenox before other AC started. Dig level upper limit of normal on 11/15/18- keep an eye on this outpatient.  2. Aortic stenosis: s/p TAVR  3. Dyspnea: etiology unclear, CTA of chest negative for PE. Given some IV Lasix yesterday.  4. Hematuria- hx of severe with lovenox. Also had epistaxis. Was supposed to have cystoscopy but had not d/t recent TAVR. Currently on ASA/Plavix.   5. Esophagitis: detected on CTA. Defer to PCP to manage outpatient.              Objective:  Temp:  [97.9 °F (36.6 °C)-98 °F (36.7 °C)] 98 °F (36.7 °C)  Heart Rate:  [69-92] 70  Resp:  [16-18] 18  BP: (112-125)/(70-87) 120/75    Intake/Output Summary (Last 24 hours) at 11/17/2018 0851  Last data filed at 11/17/2018 0411  Gross per 24 hour   Intake 600 ml   Output 1650 ml   Net -1050 ml     Body mass index is 19.65 kg/m².      11/15/18  1202 11/15/18  1853 11/17/18  0500   Weight: 53.3 kg (117 lb 9.6 oz) 53.1 kg (117 lb) 51.9 kg (114 lb 8 oz)     Weight change: -1.406 kg (-1.6 oz)    Physical Exam:    General Appearance:    Alert, cooperative, in no acute distress   Lungs:     Clear to auscultation.  Normal respiratory effort and rate.      Heart:    Irregularly irregular rhythm and mostly normal rate, normal S1 and S2, no murmurs, gallops or rubs.     Chest Wall:    No abnormalities observed   Abdomen:     Soft, nontender, positive bowel sounds.     Extremities:   no cyanosis, clubbing or edema.  No marked joint deformities.  Adequate musculoskeletal strength.               Procedures Performed      Results Review:      11/16/18 CTA of Chest:  IMPRESSION:  1. There is no evidence for pulmonary thromboemboli.   2. Mildly thickened appearance of the distal esophagus and GE  junction.  Please correlate clinically for esophagitis.        11/16/18 Echo:  Estimated EF appears to be in the range of 61 - 65%  Normal right ventricular systolic function noted. Right ventricular cavity is mildly dilated.  Left atrial cavity size is moderately dilated.  Right atrial cavity size is moderately dilated.  There is a well-seated tissue TAVR.  Gradients across the TAVR are normal  Mild tricuspid valve regurgitation is present  Calculated right ventricular systolic pressure from tricuspid regurgitation is 27 mmHg.  There is no evidence of pericardial effusion.     11/15/18 chest x-ray:  HISTORY: Recent aortic valve surgery. Shortness of breath.     FINDINGS: The lungs are slightly hyperinflated and clear. There is mild  cardiomegaly with an aortic valve stent in place. There has been  resolution of changes of interstitial edema when compared to the study  of 10/30/2018.          Consults:  Consults     Date and Time Order Name Status Description    10/24/2018 1441 Inpatient Consult to Anesthesiology Completed     10/23/2018 1030 Inpatient Urology Consult Completed           Pertinent Test Results:  Results from last 7 days   Lab Units  11/17/18   0309  11/16/18   0414  11/15/18   1422   SODIUM mmol/L  136  141  142   POTASSIUM mmol/L  4.1  4.1  3.8   CHLORIDE mmol/L  97*  101  102   CO2 mmol/L  26.4  26.2  27.1   BUN mg/dL  18  15  11   CREATININE mg/dL  0.85  0.81  0.99   GLUCOSE mg/dL  95  93  160*   CALCIUM mg/dL  9.4  9.1  9.5     Results from last 7 days   Lab Units  11/15/18   1422   TROPONIN T ng/mL  <0.010     Results from last 7 days   Lab Units  11/15/18   1422   WBC 10*3/mm3  6.05   HEMOGLOBIN g/dL  12.2   HEMATOCRIT %  37.4   PLATELETS 10*3/mm3  257     Results from last 7 days   Lab Units  11/15/18   1422   INR   1.12*   APTT seconds  28.3     Results from last 7 days   Lab Units  11/15/18   1422   MAGNESIUM mg/dL  2.1           Invalid input(s): LDLCALC  Results from last 7 days   Lab  Units  11/15/18   1422   PROBNP pg/mL  1,036.0           Discharge Medications     Discharge Medications      Continue These Medications      Instructions Start Date   aspirin 81 MG EC tablet   81 mg, Oral, Daily      clopidogrel 75 MG tablet  Commonly known as:  PLAVIX   75 mg, Oral, Daily, Follow-up with cardiology for refill.      digoxin 250 MCG tablet  Commonly known as:  LANOXIN   250 mcg, Oral, Daily Digoxin      diltiaZEM  MG 24 hr capsule  Commonly known as:  CARDIZEM CD   180 mg, Oral, Every 24 Hours Scheduled             Discharge Diet:    Dietary Orders (From admission, onward)    Start     Ordered    11/15/18 1212  Diet Regular; Cardiac  Diet Effective Now     Question Answer Comment   Diet Texture / Consistency Regular    Common Modifiers Cardiac        11/15/18 1214          Activity at Discharge:   No restrictions.    Discharge disposition: home     Discharge Instructions and Follow ups:  Future Appointments   Date Time Provider Department Center   12/5/2018 12:30 PM Black Hagen MD [facility] CTS [facility] None   12/10/2018  2:00 PM [facility] ECHO/[facility]   12/10/2018  2:30 PM Kam Sosa MD [facility] CD [facility] None   3/28/2019 11:40 AM Benedicto Marie MD [facility] CD [facility] None     Follow-up Information     Richard Carter MD .    Specialty:  Family Medicine  Contact information:  [address] [id number] [phone]             Gian Massey MD .    Specialty:  Gastroenterology  Contact information:  [address]  [place] [id number] [phone]                 Patient needs follow up in 1 week with LINDA Fabian or Dr. Marie for hospital follow-up. Please call office to arrange.     Test Results Pending at Discharge: None     LINDA Rolon  11/17/18  8:51 AM    Time: Discharge 30 min    EMR Dragon/Transcription disclaimer:   Much of this encounter note is an electronic transcription/translation of spoken language to printed text. The  electronic translation of spoken language may permit erroneous, or at times, nonsensical words or phrases to be inadvertently transcribed; Although I have reviewed the note for such errors, some may still exist.

## 2018-11-17 NOTE — PLAN OF CARE
Problem: Patient Care Overview  Goal: Plan of Care Review   11/17/18 0942   Coping/Psychosocial   Plan of Care Reviewed With patient   Plan of Care Review   Progress improving   OTHER   Outcome Summary VSS. Remains in afib. Denies pain or SOA. Chief complaint is fatigue. Home today. CTM closely.

## 2018-11-17 NOTE — PLAN OF CARE
Problem: Patient Care Overview  Goal: Plan of Care Review  Outcome: Ongoing (interventions implemented as appropriate)   11/17/18 0516   Coping/Psychosocial   Plan of Care Reviewed With patient   Plan of Care Review   Progress improving   OTHER   Outcome Summary Vitals stable. No falls. No c/o pain. Pt remains in afib, rate controlled. Possible d/c this AM. Pt resting comfortably. Monitoring closely.        Problem: Arrhythmia/Dysrhythmia (Symptomatic) (Adult)  Goal: Signs and Symptoms of Listed Potential Problems Will be Absent, Minimized or Managed (Arrhythmia/Dysrhythmia)  Outcome: Ongoing (interventions implemented as appropriate)   11/17/18 0516   Goal/Outcome Evaluation   Problems Assessed (Arrhythmia/Dysrhythmia) all   Problems Present (Dysrhythmia) electrophysiologic conduction defect;situational response       Problem: Fall Risk (Adult)  Goal: Identify Related Risk Factors and Signs and Symptoms  Outcome: Outcome(s) achieved Date Met: 11/17/18 11/17/18 0516   Fall Risk (Adult)   Related Risk Factors (Fall Risk) gait/mobility problems;sleep pattern alteration;slippery/uneven surfaces;environment unfamiliar   Signs and Symptoms (Fall Risk) presence of risk factors     Goal: Absence of Fall  Outcome: Ongoing (interventions implemented as appropriate)   11/17/18 0516   Fall Risk (Adult)   Absence of Fall making progress toward outcome

## 2018-11-26 ENCOUNTER — OFFICE VISIT (OUTPATIENT)
Dept: CARDIOLOGY | Facility: CLINIC | Age: 83
End: 2018-11-26

## 2018-11-26 VITALS
OXYGEN SATURATION: 98 % | HEART RATE: 79 BPM | HEIGHT: 64 IN | DIASTOLIC BLOOD PRESSURE: 86 MMHG | SYSTOLIC BLOOD PRESSURE: 124 MMHG | WEIGHT: 118 LBS | BODY MASS INDEX: 20.14 KG/M2

## 2018-11-26 DIAGNOSIS — I35.0 NONRHEUMATIC AORTIC VALVE STENOSIS: ICD-10-CM

## 2018-11-26 DIAGNOSIS — I48.0 PAROXYSMAL ATRIAL FIBRILLATION (HCC): Primary | ICD-10-CM

## 2018-11-26 DIAGNOSIS — Z95.2 S/P TAVR (TRANSCATHETER AORTIC VALVE REPLACEMENT): ICD-10-CM

## 2018-11-26 PROCEDURE — 99213 OFFICE O/P EST LOW 20 MIN: CPT | Performed by: NURSE PRACTITIONER

## 2018-11-26 RX ORDER — CLOPIDOGREL BISULFATE 75 MG/1
75 TABLET ORAL DAILY
Qty: 30 TABLET | Refills: 3 | Status: SHIPPED | OUTPATIENT
Start: 2018-11-26 | End: 2019-01-02

## 2018-11-26 NOTE — PROGRESS NOTES
Patient Name: Tequila Vargas  :1935  Age: 83 y.o.  Primary Cardiologist: Sahil Marie MD  Encounter Provider:  LINDA Astudillo      Chief Complaint:   Chief Complaint   Patient presents with   • Follow-up     s/p MVR         HPI  Tequila Vargas is a 83 y.o. female with a history significant for rheumatic fever as a child and aortic stenosis, atrial fibrillation with RVR.  On 10/30/18 she underwent TAVR by Dr. Corrales and Dr. Sosa.  Patient did well after discharge and remained in rate-controlled atrial fibrillation, continuing digoxin and Cardizem.  Patient is currently not anticoagulated secondary to gross hematuria while anticoagulated.  Patient was seen and evaluated by urology who felt that hematuria was related to anticoagulation, however they would like to perform cystoscopy.    At last office visit patient was very confused with her medications and she had stopped all medications for atrial fibrillation rate control.  She was advised to restart digoxin as well as diltiazem.  Patient was hospitalized 11/15-18 for episodes of dyspnea.  She was diuresed with IV diuretics and breathing improved.    Patient presents today for follow-up.  She reports that she is doing better.  She states that shortness of breath has resolved.  Heart rate is more controlled.  She has not started Eliquis yet and was waiting for our recommendation.  She denies any chest pain, shortness of breath, palpitations.  Reports that she is still fatigued from surgery but states that this is gradually improving.    The following portions of the patient's history were reviewed and updated as appropriate: allergies, current medications, past family history, past medical history, past social history, past surgical history and problem list.    Current Outpatient Medications on File Prior to Visit   Medication Sig Dispense Refill   • aspirin 81 MG EC tablet Take 1 tablet by mouth Daily. 90 tablet 3   • digoxin (LANOXIN)  "250 MCG tablet Take 1 tablet by mouth Daily. 30 tablet 11   • diltiaZEM CD (CARDIZEM CD) 180 MG 24 hr capsule Take 1 capsule by mouth Daily. 30 capsule 11   • [DISCONTINUED] clopidogrel (PLAVIX) 75 MG tablet Take 1 tablet by mouth Daily. Follow-up with cardiology for refill. 30 tablet 0     No current facility-administered medications on file prior to visit.          Review of Systems   Constitution: Positive for malaise/fatigue.   Cardiovascular: Positive for leg swelling. Negative for chest pain and palpitations.   Respiratory: Negative for shortness of breath.    Neurological: Negative for light-headedness.   All other systems reviewed and are negative.      OBJECTIVE:   Vital Signs  Vitals:    11/26/18 1124   BP: 124/86   Pulse: 79   SpO2: 98%     Estimated body mass index is 20.25 kg/m² as calculated from the following:    Height as of this encounter: 162.6 cm (64\").    Weight as of this encounter: 53.5 kg (118 lb).    Physical Exam   Constitutional: She is oriented to person, place, and time. Vital signs are normal. She appears well-developed and well-nourished. She is active.   Eyes: Conjunctivae are normal.   Neck: Carotid bruit is not present.   Cardiovascular: Normal rate, regular rhythm and normal heart sounds.   130s   Pulmonary/Chest: Breath sounds normal.   Abdominal: Normal appearance.   Neurological: She is alert and oriented to person, place, and time. GCS eye subscore is 4. GCS verbal subscore is 5. GCS motor subscore is 6.   Skin: Skin is warm, dry and intact.   Psychiatric: She has a normal mood and affect. Her speech is normal and behavior is normal. Judgment and thought content normal. Cognition and memory are normal.       Procedures    Cardiac Cath:  10/21/2018:  FINDINGS:     1. HEMODYNAMICS:  RA -/7/5, RV 32/6, PA 32/13/21, PCWP -/20/13, /11, AO 89/63/72.  The peak AoV gradient was 65, mean of 40.  The cardiac output was 5.3 L/min.     2. LEFT VENTRICULOGRAPHY: EF 60%, no mitral " regurgitation.     3. CORONARY ANGIOGRAPHY:  Right dominant system, mild coronary disease.  The left main is normal.  The proximal LAD has 10% stenosis.  The mid LAD has 10% stenosis.  The circumflex has 30% proximal stenosis.  The RCA is normal.     SUMMARY: Severe aortic stenosis.     RECOMMENDATIONS: Proceed with surgical evaluation for SAVR versus TAVR.    Cardiac Echo:  11/15/2018:  · Estimated EF appears to be in the range of 61 - 65%  · Normal right ventricular systolic function noted. Right ventricular cavity is mildly dilated.  · Left atrial cavity size is moderately dilated.  · Right atrial cavity size is moderately dilated.  · There is a well-seated tissue TAVR.  · Gradients across the TAVR are normal  · Mild tricuspid valve regurgitation is present  · Calculated right ventricular systolic pressure from tricuspid regurgitation is 27 mmHg.  · There is no evidence of pericardial effusion.    10/30/2018:  · Left ventricular systolic function is normal. Calculated EF = 60.8%. Estimated EF was in agreement with the calculated EF. Estimated EF = 61%. Normal left ventricular cavity size noted. All left ventricular wall segments contract normally. Left ventricular wall thickness is consistent with mild concentric hypertrophy. Left ventricular diastolic function was indeterminate.  · Left atrial cavity size is dilated. Left atrial volume is moderately increased.  · Right atrial cavity size is borderline dilated.  · The aortic valve is not well visualized. There is a prosthetic aortic valve. There is a TAVR valve present. The aortic valve peak and mean gradients are within defined limits. Trivial perivalvular regurgitation is present. There is no significant prosthetic valve stenosis. The inferior aspect of the aortic valve prosthesis is slightly visualized the distal LV outflow tract.  · Moderate MAC is present. Trace mitral valve regurgitation is present.  · Moderate tricuspid valve regurgitation is present.  Estimated right ventricular systolic pressure from tricuspid regurgitation is mildly elevated (35-45 mmHg). Calculated right ventricular systolic pressure from tricuspid regurgitation is 37 mmHg.    10/19/2018:  · There is severe aortic stenosis with a peak gradient of 91 mm Hg, and a mean gradient of 45 mm Hg..    10/17/2018:  · Estimated EF appears to be in the range of 51 - 55%.  · Left ventricular wall thickness is consistent with mild concentric hypertrophy.  · Normal right ventricular cavity size and systolic function noted.  · Left atrial cavity size is moderately dilated.  · There is at least moderate aortic stenosis, although this may be underestimated secondary to the atrial fibrillation  · Aortic valve maximum pressure gradient is 37 mmHg. Aortic valve mean pressure gradient is 27 mmHg. The aortic valve leaflets are severely calcified.  · Mild aortic valve regurgitation is present.  · Moderate MAC is present. Mild mitral valve regurgitation is present  · Mild to moderate tricuspid valve regurgitation is present.  · Calculated right ventricular systolic pressure from tricuspid regurgitation is 43 mmHg.  · There is no evidence of pericardial effusion.    11/4/2016:  · Left ventricular function is normal. Calculated EF = 68.5%.  · Left ventricular wall thickness is consistent with mild concentric hypertrophy.  · Left ventricular diastolic dysfunction is noted (grade II w/high LAP) consistent with pseudonormalization.  · There is moderate aortic stenosis with a mean gradient of 23 mm Hg, and a peak gradient of 40 mm Hg..  · Mild aortic valve regurgitation is present.  · Mild mitral valve regurgitation is present  · The calculated RVSP is 26 mm Hg (normal).  · There is no evidence of pericardial effusion      ASSESSMENT:      Diagnosis Plan   1. Paroxysmal atrial fibrillation (CMS/HCC)     2. Nonrheumatic aortic valve stenosis     3. S/P TAVR (transcatheter aortic valve replacement)           PLAN OF CARE:      1.  Atrial fibrillation: Patient states since heart rate has been more controlled her dyspnea has improved.  She reports that heart rate has been more managed.  She denies any episodes of chest pain, shortness of breath, palpitations.  She reports that she still has some generalized fatigue.  Patient states that she has not had any further episodes of hematuria.  At last office visit patient was given samples of Eliquis 2.5 mg.  She will begin taking these and was advised to stop medication immediately if she had episodes of hematuria.  She was also advised to call me in one week so that I could evaluate hematuria.  If she has not had any episodes of hematuria will send prescription to pharmacy.  If she has had episodes of hematuria will stop Eliquis.  Patient was counseled by urology for hematuria and they felt that this was related to Lovenox.  They would like to perform cystoscopy, however Dr. Sosa recommends that this be at least 6 months postop from TAVR.    2.  Aortic valve stenosis: s/p TAVR    3.  S/p TAVR: Patient states that her breathing and symptoms have improved since procedure.  Denies complaints.    4.  Follow-up with Dr. Sosa on 12/10/2018    Atrial Fibrillation and Atrial Flutter  Assessment  • The patient has paroxysmal atrial fibrillation  • The patient's CHADS2-VASc score is 4  • A [id number]-[id number] score of 2 or more is considered a high risk for a thromboembolic event    Plan  • Attempt to maintain sinus rhythm  • Add apixaban for antithrombotic therapy  • Continue diltiazem for rhythm control  • Continue diltiazem and digoxin for rate control      Thank you for allowing me to participate in the care of your patient,      Sincerely,   LINDA Astudillo  [facility]  11/26/18  1:42 PM

## 2018-12-05 ENCOUNTER — OFFICE VISIT (OUTPATIENT)
Dept: CARDIAC SURGERY | Facility: CLINIC | Age: 83
End: 2018-12-05

## 2018-12-05 VITALS
TEMPERATURE: 97.5 F | SYSTOLIC BLOOD PRESSURE: 115 MMHG | DIASTOLIC BLOOD PRESSURE: 73 MMHG | HEART RATE: 82 BPM | BODY MASS INDEX: 19.12 KG/M2 | RESPIRATION RATE: 20 BRPM | OXYGEN SATURATION: 99 % | WEIGHT: 112 LBS | HEIGHT: 64 IN

## 2018-12-05 DIAGNOSIS — Z95.3 S/P TAVR (TRANSCATHETER AORTIC VALVE REPLACEMENT), BIOPROSTHETIC: Primary | ICD-10-CM

## 2018-12-05 PROCEDURE — 99024 POSTOP FOLLOW-UP VISIT: CPT | Performed by: THORACIC SURGERY (CARDIOTHORACIC VASCULAR SURGERY)

## 2018-12-05 NOTE — PROGRESS NOTES
Tequila Vargas is a 83 y.o. female who underwent a TF-TAVR using a   23 mm Sapien3 transcatheter heart prosthesis. She denies any signs or symptoms suggestive of myocardial ischemia, cerebrovascular event, infection, or prosthesis malfunction. She reports reasonable exercise tolerance.    RRR, normal prosthesis heart sound.  CTA B/L.  Minimal B/L lower extremity edema.  GCS 15, no neurologic deficit.    She appears to be doing well.  She is also planning to follow-up with Dr. Marie and Dr. Sosa.    She understands to contact us should she suffer any new or recurrent problems.

## 2018-12-20 DIAGNOSIS — I35.0 NONRHEUMATIC AORTIC VALVE STENOSIS: Primary | ICD-10-CM

## 2018-12-20 DIAGNOSIS — I50.32 CHRONIC DIASTOLIC CONGESTIVE HEART FAILURE (HCC): ICD-10-CM

## 2019-01-02 ENCOUNTER — LAB (OUTPATIENT)
Dept: LAB | Facility: HOSPITAL | Age: 84
End: 2019-01-02
Attending: INTERNAL MEDICINE

## 2019-01-02 ENCOUNTER — HOSPITAL ENCOUNTER (OUTPATIENT)
Dept: CARDIOLOGY | Facility: HOSPITAL | Age: 84
Discharge: HOME | End: 2019-01-02
Attending: INTERNAL MEDICINE | Admitting: INTERNAL MEDICINE

## 2019-01-02 ENCOUNTER — OFFICE VISIT (OUTPATIENT)
Dept: CARDIOLOGY | Facility: CLINIC | Age: 84
End: 2019-01-02

## 2019-01-02 VITALS
WEIGHT: 115 LBS | HEART RATE: 86 BPM | BODY MASS INDEX: 19.63 KG/M2 | SYSTOLIC BLOOD PRESSURE: 130 MMHG | HEIGHT: 64 IN | DIASTOLIC BLOOD PRESSURE: 64 MMHG

## 2019-01-02 VITALS
HEIGHT: 64 IN | BODY MASS INDEX: 19.63 KG/M2 | HEART RATE: 83 BPM | SYSTOLIC BLOOD PRESSURE: 138 MMHG | WEIGHT: 115 LBS | DIASTOLIC BLOOD PRESSURE: 84 MMHG

## 2019-01-02 DIAGNOSIS — I48.0 PAROXYSMAL ATRIAL FIBRILLATION (HCC): ICD-10-CM

## 2019-01-02 DIAGNOSIS — I50.32 CHRONIC DIASTOLIC CONGESTIVE HEART FAILURE (HCC): ICD-10-CM

## 2019-01-02 DIAGNOSIS — Z95.2 S/P TAVR (TRANSCATHETER AORTIC VALVE REPLACEMENT): ICD-10-CM

## 2019-01-02 DIAGNOSIS — I35.0 NONRHEUMATIC AORTIC VALVE STENOSIS: Primary | ICD-10-CM

## 2019-01-02 DIAGNOSIS — I35.0 NONRHEUMATIC AORTIC VALVE STENOSIS: ICD-10-CM

## 2019-01-02 LAB
ALBUMIN SERPL-MCNC: 4.3 G/DL (ref 3.5–5.2)
ALBUMIN/GLOB SERPL: 1.5 G/DL
ALP SERPL-CCNC: 84 U/L (ref 39–117)
ALT SERPL W P-5'-P-CCNC: 14 U/L (ref 1–33)
ANION GAP SERPL CALCULATED.3IONS-SCNC: 12.8 MMOL/L
AST SERPL-CCNC: 19 U/L (ref 1–32)
BH CV ECHO MEAS - AO MAX PG (FULL): 16.5 MMHG
BH CV ECHO MEAS - AO MAX PG: 18.2 MMHG
BH CV ECHO MEAS - AO MEAN PG (FULL): 10.2 MMHG
BH CV ECHO MEAS - AO MEAN PG: 11.4 MMHG
BH CV ECHO MEAS - AO ROOT AREA (BSA CORRECTED): 2
BH CV ECHO MEAS - AO ROOT AREA: 7.5 CM^2
BH CV ECHO MEAS - AO ROOT DIAM: 3.1 CM
BH CV ECHO MEAS - AO V2 MAX: 213.6 CM/SEC
BH CV ECHO MEAS - AO V2 MEAN: 160.2 CM/SEC
BH CV ECHO MEAS - AO V2 VTI: 45.7 CM
BH CV ECHO MEAS - AVA(I,A): 0.8 CM^2
BH CV ECHO MEAS - AVA(I,D): 0.8 CM^2
BH CV ECHO MEAS - AVA(V,A): 0.77 CM^2
BH CV ECHO MEAS - AVA(V,D): 0.77 CM^2
BH CV ECHO MEAS - BSA(HAYCOCK): 1.5 M^2
BH CV ECHO MEAS - BSA: 1.5 M^2
BH CV ECHO MEAS - BZI_BMI: 19.7 KILOGRAMS/M^2
BH CV ECHO MEAS - BZI_METRIC_HEIGHT: 162.6 CM
BH CV ECHO MEAS - BZI_METRIC_WEIGHT: 52.2 KG
BH CV ECHO MEAS - EDV(MOD-SP2): 49 ML
BH CV ECHO MEAS - EDV(MOD-SP4): 44 ML
BH CV ECHO MEAS - EDV(TEICH): 143.8 ML
BH CV ECHO MEAS - EF(CUBED): 62.1 %
BH CV ECHO MEAS - EF(MOD-BP): 57 %
BH CV ECHO MEAS - EF(MOD-SP2): 57.1 %
BH CV ECHO MEAS - EF(MOD-SP4): 54.5 %
BH CV ECHO MEAS - EF(TEICH): 53.1 %
BH CV ECHO MEAS - ESV(MOD-SP2): 21 ML
BH CV ECHO MEAS - ESV(MOD-SP4): 20 ML
BH CV ECHO MEAS - ESV(TEICH): 67.4 ML
BH CV ECHO MEAS - FS: 27.6 %
BH CV ECHO MEAS - IVS/LVPW: 1
BH CV ECHO MEAS - IVSD: 0.86 CM
BH CV ECHO MEAS - LAT PEAK E' VEL: 9 CM/SEC
BH CV ECHO MEAS - LV DIASTOLIC VOL/BSA (35-75): 28.5 ML/M^2
BH CV ECHO MEAS - LV MASS(C)D: 171.9 GRAMS
BH CV ECHO MEAS - LV MASS(C)DI: 111.2 GRAMS/M^2
BH CV ECHO MEAS - LV MAX PG: 1.7 MMHG
BH CV ECHO MEAS - LV MEAN PG: 1.2 MMHG
BH CV ECHO MEAS - LV SYSTOLIC VOL/BSA (12-30): 12.9 ML/M^2
BH CV ECHO MEAS - LV V1 MAX: 66.1 CM/SEC
BH CV ECHO MEAS - LV V1 MEAN: 51.2 CM/SEC
BH CV ECHO MEAS - LV V1 VTI: 14.6 CM
BH CV ECHO MEAS - LVIDD: 5.4 CM
BH CV ECHO MEAS - LVIDS: 3.9 CM
BH CV ECHO MEAS - LVLD AP2: 6.4 CM
BH CV ECHO MEAS - LVLD AP4: 6.2 CM
BH CV ECHO MEAS - LVLS AP2: 5.7 CM
BH CV ECHO MEAS - LVLS AP4: 5.2 CM
BH CV ECHO MEAS - LVOT AREA (M): 2.5 CM^2
BH CV ECHO MEAS - LVOT AREA: 2.5 CM^2
BH CV ECHO MEAS - LVOT DIAM: 1.8 CM
BH CV ECHO MEAS - LVPWD: 0.86 CM
BH CV ECHO MEAS - MED PEAK E' VEL: 9 CM/SEC
BH CV ECHO MEAS - MV DEC SLOPE: 527 CM/SEC^2
BH CV ECHO MEAS - MV DEC TIME: 0.22 SEC
BH CV ECHO MEAS - MV E MAX VEL: 114 CM/SEC
BH CV ECHO MEAS - MV MAX PG: 6.1 MMHG
BH CV ECHO MEAS - MV MEAN PG: 2.1 MMHG
BH CV ECHO MEAS - MV P1/2T MAX VEL: 115.7 CM/SEC
BH CV ECHO MEAS - MV P1/2T: 64.3 MSEC
BH CV ECHO MEAS - MV V2 MAX: 123.1 CM/SEC
BH CV ECHO MEAS - MV V2 MEAN: 65.3 CM/SEC
BH CV ECHO MEAS - MV V2 VTI: 27.4 CM
BH CV ECHO MEAS - MVA P1/2T LCG: 1.9 CM^2
BH CV ECHO MEAS - MVA(P1/2T): 3.4 CM^2
BH CV ECHO MEAS - MVA(VTI): 1.3 CM^2
BH CV ECHO MEAS - PA MAX PG (FULL): 4.3 MMHG
BH CV ECHO MEAS - PA MAX PG: 5.3 MMHG
BH CV ECHO MEAS - PA V2 MAX: 114.6 CM/SEC
BH CV ECHO MEAS - PVA(V,A): 1.3 CM^2
BH CV ECHO MEAS - PVA(V,D): 1.3 CM^2
BH CV ECHO MEAS - QP/QS: 0.83
BH CV ECHO MEAS - RAP SYSTOLE: 3 MMHG
BH CV ECHO MEAS - RV MAX PG: 1 MMHG
BH CV ECHO MEAS - RV MEAN PG: 0.62 MMHG
BH CV ECHO MEAS - RV V1 MAX: 50 CM/SEC
BH CV ECHO MEAS - RV V1 MEAN: 36.8 CM/SEC
BH CV ECHO MEAS - RV V1 VTI: 9.9 CM
BH CV ECHO MEAS - RVOT AREA: 3.1 CM^2
BH CV ECHO MEAS - RVOT DIAM: 2 CM
BH CV ECHO MEAS - RVSP: 29 MMHG
BH CV ECHO MEAS - SI(AO): 222.6 ML/M^2
BH CV ECHO MEAS - SI(CUBED): 64.7 ML/M^2
BH CV ECHO MEAS - SI(LVOT): 23.6 ML/M^2
BH CV ECHO MEAS - SI(MOD-SP2): 18.1 ML/M^2
BH CV ECHO MEAS - SI(MOD-SP4): 15.5 ML/M^2
BH CV ECHO MEAS - SI(TEICH): 49.4 ML/M^2
BH CV ECHO MEAS - SUP REN AO DIAM: 1.8 CM
BH CV ECHO MEAS - SV(AO): 344.2 ML
BH CV ECHO MEAS - SV(CUBED): 100 ML
BH CV ECHO MEAS - SV(LVOT): 36.4 ML
BH CV ECHO MEAS - SV(MOD-SP2): 28 ML
BH CV ECHO MEAS - SV(MOD-SP4): 24 ML
BH CV ECHO MEAS - SV(RVOT): 30.1 ML
BH CV ECHO MEAS - SV(TEICH): 76.4 ML
BH CV ECHO MEAS - TAPSE (>1.6): 1.9 CM2
BH CV ECHO MEAS - TR MAX VEL: 283.9 CM/SEC
BH CV ECHO MEASUREMENTS AVERAGE E/E' RATIO: 12.67
BH CV XLRA - RV BASE: 3.5 CM
BH CV XLRA - TDI S': 14 CM/SEC
BILIRUB SERPL-MCNC: 0.3 MG/DL (ref 0.1–1.2)
BUN BLD-MCNC: 16 MG/DL (ref 8–23)
BUN/CREAT SERPL: 18.2 (ref 7–25)
CALCIUM SPEC-SCNC: 9.5 MG/DL (ref 8.6–10.5)
CHLORIDE SERPL-SCNC: 100 MMOL/L (ref 98–107)
CO2 SERPL-SCNC: 27.2 MMOL/L (ref 22–29)
CREAT BLD-MCNC: 0.88 MG/DL (ref 0.57–1)
GFR SERPL CREATININE-BSD FRML MDRD: 62 ML/MIN/1.73
GLOBULIN UR ELPH-MCNC: 2.8 GM/DL
GLUCOSE BLD-MCNC: 143 MG/DL (ref 65–99)
LEFT ATRIUM VOLUME INDEX: 29 ML/M2
LEFT ATRIUM VOLUME: 44 CM3
LV EF 2D ECHO EST: 57 %
MAXIMAL PREDICTED HEART RATE: 139 BPM
POTASSIUM BLD-SCNC: 4.1 MMOL/L (ref 3.5–5.2)
PROT SERPL-MCNC: 7.1 G/DL (ref 6–8.5)
SODIUM BLD-SCNC: 140 MMOL/L (ref 136–145)
STRESS TARGET HR: 118 BPM

## 2019-01-02 PROCEDURE — 36415 COLL VENOUS BLD VENIPUNCTURE: CPT

## 2019-01-02 PROCEDURE — 93000 ELECTROCARDIOGRAM COMPLETE: CPT | Performed by: INTERNAL MEDICINE

## 2019-01-02 PROCEDURE — 80053 COMPREHEN METABOLIC PANEL: CPT

## 2019-01-02 PROCEDURE — 99213 OFFICE O/P EST LOW 20 MIN: CPT | Performed by: INTERNAL MEDICINE

## 2019-01-02 PROCEDURE — 93306 TTE W/DOPPLER COMPLETE: CPT

## 2019-01-02 PROCEDURE — 93306 TTE W/DOPPLER COMPLETE: CPT | Performed by: INTERNAL MEDICINE

## 2019-01-02 NOTE — PROGRESS NOTES
Subjective:     Encounter Date:01/02/2019      Patient ID: Tequila Vargas is a 83 y.o. female.    Chief Complaint: AS, s/p TAVR, PAF    History of Present Illness    Dear Dr. Carter:    I had the pleasure of seeing Tequila Vargas in cardiac followup today. As you well she is a paula 83-year-old woman who follows with Dr. Swenson for her cardiac care. She has a history of severe aortic stenosis. She was treated with TAVR using a 23 mm MADI 3 valve.     She comes in for her 1 month TAVR followup. Since her procedure, she reports doing very well. She has no complaints of angina, dyspnea, palpitations or syncope.    She had an echocardiogram that showed a well functioning TAVR prosthesis.     Since her procedure she reports no complaints of angina, dyspnea, palpitations or syncope.        Review of Systems   All other systems reviewed and are negative.        ECG 12 Lead  Date/Time: 1/2/2019 1:42 PM  Performed by: Kam Sosa MD  Authorized by: Kam Sosa MD   Comparison: compared with previous ECG   Similar to previous ECG  Rhythm: atrial fibrillation  BPM: 86  Comments: [id number]               Objective:     Physical Exam   Constitutional: She is oriented to person, place, and time. She appears well-developed and well-nourished.   HENT:   Head: Normocephalic and atraumatic.   Neck: Normal range of motion. Neck supple.   Cardiovascular: Normal rate and normal heart sounds. An irregularly irregular rhythm present.   Pulmonary/Chest: Effort normal and breath sounds normal.   Abdominal: Soft. Bowel sounds are normal.   Musculoskeletal: Normal range of motion.   Neurological: She is alert and oriented to person, place, and time.   Skin: Skin is warm and dry.   Psychiatric: She has a normal mood and affect. Her behavior is normal. Thought content normal.   Vitals reviewed.      Lab Review:       Assessment:          Diagnosis Plan   1. Nonrheumatic aortic valve stenosis     2. S/P TAVR (transcatheter aortic  valve replacement)     3. Paroxysmal atrial fibrillation (CMS/[facility])            Plan:       It was a pleasure to see your patient in cardiac followup. She is doing very well from the cardiac standpoint. She has no complaints of heart failure and her valve looks very good by echocardiogram.    She was found to be in paroxysmal atrial fibrillation. She was advised to take apixaban 2.5 mg twice daily for stroke prevention. I have reiterated this with her today. She will see Dr. Swenson in followup. She will see me at the 1 year time for followup.    Atrial Fibrillation and Atrial Flutter  Assessment  • The patient has paroxysmal atrial fibrillation  • The patient's CHADS2-VASc score is 4  • A [id number] score of 2 or more is considered a high risk for a thromboembolic event    Plan  • Attempt to maintain sinus rhythm  • Add apixaban for antithrombotic therapy  • Continue diltiazem for rhythm control  • Continue diltiazem and digoxin for rate control

## 2019-01-07 ENCOUNTER — TELEPHONE (OUTPATIENT)
Dept: CARDIOLOGY | Facility: CLINIC | Age: 84
End: 2019-01-07

## 2019-01-07 NOTE — TELEPHONE ENCOUNTER
I spoke with her.  This is likely the Cardizem.  I have her set up to see Kym tomorrow.  Thanks     JAYDEN

## 2019-01-07 NOTE — TELEPHONE ENCOUNTER
Pt called saying when she saw Dr. Sosa on 01-02-19 that she was changed from Clopidogrel to Eliquis.  She started the Eliquis on 01-03-19 and started having swelling the next day in feet and ankles.  She had it all weekend and this morning it was gone but is now back.  She doesn't know her BP/HR.  She denies all other symptoms but did mention a little bit of SOA when coming up the basement steps.   Please advise.    Thanks,  Karolyn    Pt's# [phone]

## 2019-01-08 ENCOUNTER — OFFICE VISIT (OUTPATIENT)
Dept: CARDIOLOGY | Facility: CLINIC | Age: 84
End: 2019-01-08

## 2019-01-08 VITALS
HEIGHT: 64 IN | SYSTOLIC BLOOD PRESSURE: 128 MMHG | OXYGEN SATURATION: 99 % | HEART RATE: 91 BPM | DIASTOLIC BLOOD PRESSURE: 78 MMHG | BODY MASS INDEX: 21 KG/M2 | WEIGHT: 123 LBS

## 2019-01-08 DIAGNOSIS — R60.0 PEDAL EDEMA: Primary | ICD-10-CM

## 2019-01-08 DIAGNOSIS — I48.0 PAROXYSMAL ATRIAL FIBRILLATION (HCC): ICD-10-CM

## 2019-01-08 PROCEDURE — 99213 OFFICE O/P EST LOW 20 MIN: CPT | Performed by: NURSE PRACTITIONER

## 2019-01-08 NOTE — PROGRESS NOTES
Patient Name: Tequila Vargas  :1935  Age: 83 y.o.  Primary Cardiologist: Sahil Marie MD  Encounter Provider:  LINDA Astudillo      Chief Complaint:   Chief Complaint   Patient presents with   • Follow-up     EDEMA         HPI  Tequila Vargas is a 83 y.o. female with a history significant for rheumatic fever as a child and aortic stenosis, atrial fibrillation with RVR.  On 10/30/18 she underwent TAVR by Dr. Corrales and Dr. Sosa.  Patient did well after discharge and remained in rate-controlled atrial fibrillation, continuing digoxin and Cardizem.  Patient is currently not anticoagulated secondary to gross hematuria while anticoagulated.  Patient was seen and evaluated by urology who felt that hematuria was related to anticoagulation, however they would like to perform cystoscopy.    Patient presents today for pedal edema.  Patient reports that pedal edema began on .  She states that she has had some mild shortness of breath on exertion occasionally when she walks up her basement steps.  Otherwise she denies shortness of breath.  Patient also denies any chest pain, heart palpitations, tachycardia, lightheadedness, fatigue.  Patient states that since being seen by Dr. Sosa she has been taking Eliquis 2.5 mg twice daily.  She states that she has not had any further episodes of hematuria.  She also denies nose bleeds and blood in the stool.    The following portions of the patient's history were reviewed and updated as appropriate: allergies, current medications, past family history, past medical history, past social history, past surgical history and problem list.    Current Outpatient Medications on File Prior to Visit   Medication Sig Dispense Refill   • apixaban (ELIQUIS) 2.5 MG tablet tablet Take 1 tablet by mouth Every 12 (Twelve) Hours. 60 tablet 11   • aspirin 81 MG EC tablet Take 1 tablet by mouth Daily. 90 tablet 3   • digoxin (LANOXIN) 250 MCG tablet Take 1 tablet by mouth Daily. 30  "tablet 11   • diltiaZEM CD (CARDIZEM CD) 180 MG 24 hr capsule Take 1 capsule by mouth Daily. 30 capsule 11     No current facility-administered medications on file prior to visit.          Review of Systems   Constitution: Positive for malaise/fatigue.   Cardiovascular: Positive for leg swelling. Negative for chest pain and palpitations.   Respiratory: Negative for shortness of breath.    Neurological: Negative for light-headedness.   All other systems reviewed and are negative.      OBJECTIVE:   Vital Signs  Vitals:    01/08/19 1315   BP: 128/78   Pulse: 91   SpO2: 99%     Estimated body mass index is 21.11 kg/m² as calculated from the following:    Height as of this encounter: 162.6 cm (64\").    Weight as of this encounter: 55.8 kg (123 lb).    Physical Exam   Constitutional: She is oriented to person, place, and time. Vital signs are normal. She appears well-developed and well-nourished. She is active.   Eyes: Conjunctivae are normal.   Neck: Carotid bruit is not present.   Cardiovascular: Normal rate, regular rhythm and normal heart sounds.   130s   Pulmonary/Chest: Breath sounds normal.   Abdominal: Normal appearance.   Musculoskeletal:   Bilateral lower extremity pedal edema 1+   Neurological: She is alert and oriented to person, place, and time. GCS eye subscore is 4. GCS verbal subscore is 5. GCS motor subscore is 6.   Skin: Skin is warm, dry and intact.   Psychiatric: She has a normal mood and affect. Her speech is normal and behavior is normal. Judgment and thought content normal. Cognition and memory are normal.       Procedures      Cardiac Cath:  10/21/2018:  FINDINGS:     1. HEMODYNAMICS:  RA -/7/5, RV 32/6, PA 32/13/21, PCWP -/20/13, /11, AO 89/63/72.  The peak AoV gradient was 65, mean of 40.  The cardiac output was 5.3 L/min.     2. LEFT VENTRICULOGRAPHY: EF 60%, no mitral regurgitation.     3. CORONARY ANGIOGRAPHY:  Right dominant system, mild coronary disease.  The left main is normal.  The " proximal LAD has 10% stenosis.  The mid LAD has 10% stenosis.  The circumflex has 30% proximal stenosis.  The RCA is normal.     SUMMARY: Severe aortic stenosis.     RECOMMENDATIONS: Proceed with surgical evaluation for SAVR versus TAVR.    Cardiac Echo:  11/15/2018:  · Estimated EF appears to be in the range of 61 - 65%  · Normal right ventricular systolic function noted. Right ventricular cavity is mildly dilated.  · Left atrial cavity size is moderately dilated.  · Right atrial cavity size is moderately dilated.  · There is a well-seated tissue TAVR.  · Gradients across the TAVR are normal  · Mild tricuspid valve regurgitation is present  · Calculated right ventricular systolic pressure from tricuspid regurgitation is 27 mmHg.  · There is no evidence of pericardial effusion.    10/30/2018:  · Left ventricular systolic function is normal. Calculated EF = 60.8%. Estimated EF was in agreement with the calculated EF. Estimated EF = 61%. Normal left ventricular cavity size noted. All left ventricular wall segments contract normally. Left ventricular wall thickness is consistent with mild concentric hypertrophy. Left ventricular diastolic function was indeterminate.  · Left atrial cavity size is dilated. Left atrial volume is moderately increased.  · Right atrial cavity size is borderline dilated.  · The aortic valve is not well visualized. There is a prosthetic aortic valve. There is a TAVR valve present. The aortic valve peak and mean gradients are within defined limits. Trivial perivalvular regurgitation is present. There is no significant prosthetic valve stenosis. The inferior aspect of the aortic valve prosthesis is slightly visualized the distal LV outflow tract.  · Moderate MAC is present. Trace mitral valve regurgitation is present.  · Moderate tricuspid valve regurgitation is present. Estimated right ventricular systolic pressure from tricuspid regurgitation is mildly elevated (35-45 mmHg). Calculated right  ventricular systolic pressure from tricuspid regurgitation is 37 mmHg.    10/19/2018:  · There is severe aortic stenosis with a peak gradient of 91 mm Hg, and a mean gradient of 45 mm Hg..    10/17/2018:  · Estimated EF appears to be in the range of 51 - 55%.  · Left ventricular wall thickness is consistent with mild concentric hypertrophy.  · Normal right ventricular cavity size and systolic function noted.  · Left atrial cavity size is moderately dilated.  · There is at least moderate aortic stenosis, although this may be underestimated secondary to the atrial fibrillation  · Aortic valve maximum pressure gradient is 37 mmHg. Aortic valve mean pressure gradient is 27 mmHg. The aortic valve leaflets are severely calcified.  · Mild aortic valve regurgitation is present.  · Moderate MAC is present. Mild mitral valve regurgitation is present  · Mild to moderate tricuspid valve regurgitation is present.  · Calculated right ventricular systolic pressure from tricuspid regurgitation is 43 mmHg.  · There is no evidence of pericardial effusion.    11/4/2016:  · Left ventricular function is normal. Calculated EF = 68.5%.  · Left ventricular wall thickness is consistent with mild concentric hypertrophy.  · Left ventricular diastolic dysfunction is noted (grade II w/high LAP) consistent with pseudonormalization.  · There is moderate aortic stenosis with a mean gradient of 23 mm Hg, and a peak gradient of 40 mm Hg..  · Mild aortic valve regurgitation is present.  · Mild mitral valve regurgitation is present  · The calculated RVSP is 26 mm Hg (normal).  · There is no evidence of pericardial effusion      ASSESSMENT:      Diagnosis Plan   1. Pedal edema     2. Paroxysmal atrial fibrillation (CMS/Formerly [facility])           PLAN OF CARE:     1. Pedal edema: Patient has some lower extremity edema over the past week.  Patient is on a calcium channel blocker.  Her heart rate is currently controlled at 91 and blood pressure is stable at 128/78.   Will stop diltiazem was exam and start metoprolol tartrate 25 mg to be taken twice daily.  Patient was also recommended to wear compression stockings as well as to elevate her lower extremities.  Patient is agreeable to the plan of care.  2. Paroxysmal atrial fibrillation: Patient denies any elevated heart rates or palpitations.  She denies chest pain.  Reports occasional dyspnea on exertion but states this is that this is well controlled.  Patient has began Eliquis and states that she has not had any episodes of hematuria, melena, epistaxis.  3. Patient to notify the office if she does not feel well after starting metoprolol tartrate, if her heart rate is elevated or for any other concerns.  Follow-ups as previously scheduled with Dr. Marie and Dr. Sosa.    Atrial Fibrillation and Atrial Flutter  Assessment  • The patient has paroxysmal atrial fibrillation  • The patient's CHADS2-VASc score is 4  • A [id number] score of 2 or more is considered a high risk for a thromboembolic event    Plan  • Attempt to maintain sinus rhythm  • Continue apixaban for antithrombotic therapy, bleeding issues discussed  • Add beta blocker for rhythm control  • Continue digoxin for rate control  • Add beta blocker for rate control      Thank you for allowing me to participate in the care of your patient,      Sincerely,   LINDA Astudillo  [facility]  01/08/19  1:49 PM

## 2019-04-02 ENCOUNTER — OFFICE VISIT (OUTPATIENT)
Dept: CARDIOLOGY | Facility: CLINIC | Age: 84
End: 2019-04-02

## 2019-04-02 VITALS
SYSTOLIC BLOOD PRESSURE: 128 MMHG | OXYGEN SATURATION: 99 % | DIASTOLIC BLOOD PRESSURE: 72 MMHG | WEIGHT: 119 LBS | BODY MASS INDEX: 20.32 KG/M2 | HEIGHT: 64 IN | HEART RATE: 88 BPM

## 2019-04-02 DIAGNOSIS — I48.19 PERSISTENT ATRIAL FIBRILLATION (HCC): ICD-10-CM

## 2019-04-02 DIAGNOSIS — Z95.3 STATUS POST TRANSCATHETER AORTIC VALVE REPLACEMENT (TAVR) USING BIOPROSTHESIS: Primary | ICD-10-CM

## 2019-04-02 PROCEDURE — 99214 OFFICE O/P EST MOD 30 MIN: CPT | Performed by: INTERNAL MEDICINE

## 2019-04-25 NOTE — PROGRESS NOTES
Date of Office Visit: 2019  Encounter Provider: Benedicto Marie MD  Place of Service: [facility] CARDIOLOGY  Patient Name: Tequila Vargas  :1935    Chief complaint: Follow-up for TAVR, persistent atrial fibrillation.    History of Present Illness:    I again had the pleasure of seeing the patient in cardiology office on 2019.  She is a   very pleasant 83 year-old white female with a history of persistent atrial fibrillation, aortic   stenosis status post TAVR, and rheumatic fever as a child who presents for follow-up.    The patient had a long-standing history of aortic stenosis.  She presented in 2018 with shortness of breath and chest tightness.  When she came in to have an   echocardiogram performed in the office, it was discovered that she was in atrial   fibrillation.  She was admitted to the hospital and was rate controlled and placed on   Lovenox.  However, the Lovenox had to be stopped secondary to gross hematuria and   nosebleeds.  Her urological work-up was negative.  A limited echocardiogram on   10/19/2018 confirmed severe aortic stenosis with a peak gradient of 91 mmHg and a   mean gradient of 45 mmHg.  A subsequent left and right heart catheterization showed   no evidence of pulmonary hypertension and very mild nonobstructive coronary artery   disease of up to 30% in the proximal left circumflex.  She ultimately underwent a TAVR   with  placement of a 23 mm Wes 3 transcatheter aortic valve.  She eventually was   started on Eliquis as an outpatient, and had no significant bleeding issues.    The patient presents today for follow-up.  Overall, she has been doing excellent.  She   is taking the Eliquis at 2.5 mg twice a day without any difficulty.  She has had no chest   pain or shortness of breath.  She feels much better since her aortic valve was replaced.    She tells me that her heart rate has mainly been in the 70s to 80s.    Past  Medical History:   Diagnosis Date   • Aortic stenosis     Status post TAVR 10/30/2018 (23 mm Wes 3 valve)   • Atrial fibrillation (CMS/HCC)    • Chronic back pain    • Colon polyps    • Osteoporosis    • Rheumatic fever     As Child   • S/P TAVR (transcatheter aortic valve replacement)    • Seasonal allergies    • Spinal stenosis    • Squamous cell carcinoma     Nose       Past Surgical History:   Procedure Laterality Date   • AORTIC VALVE REPAIR/REPLACEMENT N/A 10/30/2018    Procedure: TRANSFEMORAL TRANSCATHETER AORTIC VALVE REPLACEMENT WITH POSSIBLE BAIL OUT OPEN HEART CARDIAC SURGERY with TTE;  Surgeon: Black Hagen MD;  Location: [facility] OR 18/19;  Service: Cardiothoracic   • APPENDECTOMY     • CARDIAC CATHETERIZATION Left 10/21/2018    Procedure: Cardiac Catheterization/Vascular Study;  Surgeon: Kam Sosa MD;  Location: [facility] CATH INVASIVE LOCATION;  Service: Cardiology   • CARDIAC CATHETERIZATION N/A 10/21/2018    Procedure: Left Heart Cath;  Surgeon: Kam Sosa MD;  Location: [facility] CATH INVASIVE LOCATION;  Service: Cardiology   • CARDIAC CATHETERIZATION N/A 10/21/2018    Procedure: Coronary angiography;  Surgeon: Kam Sosa MD;  Location: [facility] CATH INVASIVE LOCATION;  Service: Cardiology   • CARDIAC CATHETERIZATION N/A 10/21/2018    Procedure: Right Heart Cath;  Surgeon: Kam Sosa MD;  Location: [facility] CATH INVASIVE LOCATION;  Service: Cardiology   • COLONOSCOPY     • ELBOW ARTHROPLASTY Right    • EXCISION MASS HEAD/NECK N/A 11/28/2017    Procedure: EXCISION ACTINIC KERATOSIS NASAL TIP;  Surgeon: Maurine Waterhouse, MD;  Location: [facility] [place] [facility] [place];  Service:    • HAND SURGERY Right     excision of tendon cyst; ganglion cyst removal   • HYSTERECTOMY      s/p partial hysterectomy (one ovary intact - although she does not know which one).   • MYRINGOTOMY Left     WITH TUBE PLACEMENT. SINCE REMOVED   • TOTAL KNEE ARTHROPLASTY Right    • TOTAL KNEE ARTHROPLASTY REVISION Right        Current  "Outpatient Medications on File Prior to Visit   Medication Sig Dispense Refill   • apixaban (ELIQUIS) 2.5 MG tablet tablet Take 1 tablet by mouth Every 12 (Twelve) Hours. 60 tablet 11   • aspirin 81 MG EC tablet Take 1 tablet by mouth Daily. 90 tablet 3   • digoxin (LANOXIN) 250 MCG tablet Take 1 tablet by mouth Daily. 30 tablet 11   • metoprolol tartrate (LOPRESSOR) 25 MG tablet Take 1 tablet by mouth 2 (Two) Times a Day. 60 tablet 11     No current facility-administered medications on file prior to visit.      Allergies as of 04/02/2019   • (No Known Allergies)     Social History     Socioeconomic History   • Marital status:      Spouse name: Not on file   • Number of children: Not on file   • Years of education: Not on file   • Highest education level: Not on file   Tobacco Use   • Smoking status: Never Smoker   • Smokeless tobacco: Never Used   Substance and Sexual Activity   • Alcohol use: No     Comment: no caffeine   • Drug use: No     Family History   Problem Relation Age of Onset   • Colon cancer Father    • Heart failure Father    • Heart disease Sister         s/p valve replacement (valve unknown)   • Colon cancer Brother    • Malig Hyperthermia Neg Hx        Review of Systems   Respiratory: Positive for cough.    All other systems reviewed and are negative.     Objective:     Vitals:    04/02/19 1430   BP: 128/72   BP Location: Left arm   Patient Position: Sitting   Cuff Size: Adult   Pulse: 88   SpO2: 99%   Weight: 54 kg (119 lb)   Height: 162.6 cm (64\")     Body mass index is 20.43 kg/m².    Physical Exam   Constitutional: She is oriented to person, place, and time. She appears well-developed and well-nourished.   HENT:   Head: Normocephalic and atraumatic.   Eyes: Conjunctivae are normal.   Neck: Neck supple.   Cardiovascular: Normal rate. An irregularly irregular rhythm present. Exam reveals no gallop and no friction rub.   Murmur heard.   Systolic murmur is present with a grade of 2/6 at the " upper right sternal border.  Pulmonary/Chest: Effort normal and breath sounds normal.   Abdominal: Soft. There is no tenderness.   Musculoskeletal: She exhibits no edema.   Neurological: She is alert and oriented to person, place, and time.   Skin: Skin is warm.   Psychiatric: She has a normal mood and affect. Her behavior is normal.     Lab Review:   Procedures    Cardiac Procedures:  1. Limited echocardiogram on 10/19/2018: There was severe aortic stenosis with a   peak gradient of 91 mmHg and a mean gradient of 45 mmHg.  2. Left and right heart catheterization on 10/21/2018: The mean pulmonary pressure   was 21 mmHg.  The LAD had 10% proximal and mid disease.  The left circumflex   had 30% proximal disease.  The RCA was normal.  3. Status post placement of a 23 mm Wes 3 transcatheter aortic valve on 10/30/18  4. Echocardiogram on 1/2/2019: The ejection fraction was 57%.  Gradients across   the TAVR were normal.  There was moderate mitral annular calcification.  There was   moderate tricuspid regurgitation.    Assessment:       Diagnosis Plan   1. Status post transcatheter aortic valve replacement (TAVR) using bioprosthesis     2. Persistent atrial fibrillation (CMS/HCC)       Plan:       The patient seems to be doing excellent on her current regimen.  Her atrial fibrillation is   rate-controlled on the digoxin and metoprolol.  She did have some hypotension upon first   starting the metoprolol, although this seems to have resolved.  She has had no hematuria   or nosebleeds with the Eliquis at 2.5 mg twice a day.  She did have bleeding issues with   Lovenox in the hospital prior to this.  Her transcatheter aortic valve looked good on the   last echo on 1/2/2019.  I did advise her to take antibiotics before any dental appointments.    I spent greater than 30 minutes in the care of the patient, including reviewing records   and in direct patient care.  Greater than 50% of this time was spent in face-to-face    counseling with the patient regarding her atrial fibrillation and aortic valve replacement.    Atrial Fibrillation and Atrial Flutter  Assessment  • The patient has persistent atrial fibrillation  • This is non-valvular in etiology  • The patient's CHADS2-VASc score is 3  • A [id number] score of 2 or more is considered a high risk for a thromboembolic event  • Apixaban prescribed    Plan  • Continue in atrial fibrillation with rate control  • Continue apixaban for antithrombotic therapy, bleeding issues discussed  • Continue beta blocker and digoxin for rate control    Subjective - Objective  • The average duration of atrial fibrillation episodes is >3 months

## 2019-05-06 ENCOUNTER — TELEPHONE (OUTPATIENT)
Dept: CARDIOLOGY | Facility: CLINIC | Age: 84
End: 2019-05-06

## 2019-05-06 NOTE — TELEPHONE ENCOUNTER
I called pt gave her message from Dr Marie.   She understood and will call if any thing get worse.   Jonh ACUÑA

## 2019-05-06 NOTE — TELEPHONE ENCOUNTER
Jonh,    Her last echo looked good and her atrial fibrillation is controlled.  If she is only short of breath with stairs, she is probably ok.  However, if she is short of breath with all activity, then I probably need to see her.  Thanks     JAYDEN

## 2019-05-06 NOTE — TELEPHONE ENCOUNTER
05/06/19   Pt called stating she has had increased soa with going up stairs. She states it started last week. She does not have any swelling,no chest pain and heart rate is doing good.   She was not sure if this was normal after a TAVR or if she needed to be concerned?   Please advise  Pt's call back # [phone]   Thanks Jonh ACUÑA

## 2019-10-08 ENCOUNTER — OFFICE VISIT (OUTPATIENT)
Dept: CARDIOLOGY | Facility: CLINIC | Age: 84
End: 2019-10-08

## 2019-10-08 VITALS
HEART RATE: 63 BPM | SYSTOLIC BLOOD PRESSURE: 108 MMHG | BODY MASS INDEX: 20.52 KG/M2 | OXYGEN SATURATION: 98 % | HEIGHT: 64 IN | DIASTOLIC BLOOD PRESSURE: 72 MMHG | WEIGHT: 120.2 LBS

## 2019-10-08 DIAGNOSIS — I50.9 HEART FAILURE, UNSPECIFIED HF CHRONICITY, UNSPECIFIED HEART FAILURE TYPE (HCC): ICD-10-CM

## 2019-10-08 DIAGNOSIS — I35.0 AORTIC VALVE STENOSIS, ETIOLOGY OF CARDIAC VALVE DISEASE UNSPECIFIED: Primary | ICD-10-CM

## 2019-10-08 DIAGNOSIS — Z95.2 S/P TAVR (TRANSCATHETER AORTIC VALVE REPLACEMENT): ICD-10-CM

## 2019-10-08 DIAGNOSIS — I48.11 LONGSTANDING PERSISTENT ATRIAL FIBRILLATION (HCC): Primary | ICD-10-CM

## 2019-10-08 PROCEDURE — 99213 OFFICE O/P EST LOW 20 MIN: CPT | Performed by: INTERNAL MEDICINE

## 2019-10-08 RX ORDER — AMOXICILLIN 500 MG/1
2000 CAPSULE ORAL SEE ADMIN INSTRUCTIONS
Qty: 4 CAPSULE | Refills: 2 | Status: SHIPPED | OUTPATIENT
Start: 2019-10-08 | End: 2019-11-06 | Stop reason: SDUPTHER

## 2019-10-09 ENCOUNTER — TELEPHONE (OUTPATIENT)
Dept: CARDIOLOGY | Facility: CLINIC | Age: 84
End: 2019-10-09

## 2019-10-09 NOTE — TELEPHONE ENCOUNTER
Patient has requested a refill of his Digoxin 0.25 mg qD.  I looked and the last Digoxin level was drawn on 11/15/18 and it was 1.20.  I was not sure the protacol for this med.  Please advise if ok to fill for 90 day supply.    [facility] on [address] - [phone]    Thanks,  Grecia

## 2019-10-09 NOTE — TELEPHONE ENCOUNTER
I typically don't check Digoxin levels on my patients routinely.  I am fine with refilling this.  Can you please take care of this?  Thanks    GM

## 2019-10-10 RX ORDER — DIGOXIN 250 MCG
250 TABLET ORAL
Qty: 30 TABLET | Refills: 5 | Status: SHIPPED | OUTPATIENT
Start: 2019-10-10 | End: 2020-02-07 | Stop reason: SDUPTHER

## 2019-10-23 ENCOUNTER — HOSPITAL ENCOUNTER (OUTPATIENT)
Dept: CARDIOLOGY | Facility: HOSPITAL | Age: 84
Discharge: HOME | End: 2019-10-23
Admitting: INTERNAL MEDICINE

## 2019-10-23 ENCOUNTER — LAB (OUTPATIENT)
Dept: LAB | Facility: HOSPITAL | Age: 84
End: 2019-10-23

## 2019-10-23 ENCOUNTER — OFFICE VISIT (OUTPATIENT)
Dept: CARDIOLOGY | Facility: CLINIC | Age: 84
End: 2019-10-23

## 2019-10-23 VITALS
BODY MASS INDEX: 20.49 KG/M2 | HEIGHT: 64 IN | SYSTOLIC BLOOD PRESSURE: 122 MMHG | DIASTOLIC BLOOD PRESSURE: 70 MMHG | HEART RATE: 90 BPM | WEIGHT: 120 LBS

## 2019-10-23 VITALS
BODY MASS INDEX: 20.96 KG/M2 | SYSTOLIC BLOOD PRESSURE: 130 MMHG | HEART RATE: 58 BPM | WEIGHT: 122.8 LBS | HEIGHT: 64 IN | DIASTOLIC BLOOD PRESSURE: 70 MMHG

## 2019-10-23 DIAGNOSIS — Z95.2 S/P TAVR (TRANSCATHETER AORTIC VALVE REPLACEMENT): ICD-10-CM

## 2019-10-23 DIAGNOSIS — I50.9 HEART FAILURE, UNSPECIFIED HF CHRONICITY, UNSPECIFIED HEART FAILURE TYPE (HCC): ICD-10-CM

## 2019-10-23 DIAGNOSIS — I35.0 AORTIC VALVE STENOSIS, ETIOLOGY OF CARDIAC VALVE DISEASE UNSPECIFIED: ICD-10-CM

## 2019-10-23 DIAGNOSIS — I35.0 NONRHEUMATIC AORTIC VALVE STENOSIS: Primary | ICD-10-CM

## 2019-10-23 DIAGNOSIS — I48.21 PERMANENT ATRIAL FIBRILLATION (HCC): ICD-10-CM

## 2019-10-23 LAB
ANION GAP SERPL CALCULATED.3IONS-SCNC: 13.7 MMOL/L (ref 5–15)
AORTIC ARCH: 2 CM
AORTIC DIMENSIONLESS INDEX: 0.6 (DI)
AORTIC ROOT ANNULUS: 1.9 CM
ASCENDING AORTA: 3 CM
BASOPHILS # BLD AUTO: 0.03 10*3/MM3 (ref 0–0.2)
BASOPHILS NFR BLD AUTO: 0.5 % (ref 0–1.5)
BH CV ECHO MEAS - AO MAX PG (FULL): 11.5 MMHG
BH CV ECHO MEAS - AO MAX PG: 17.6 MMHG
BH CV ECHO MEAS - AO MEAN PG (FULL): 6.4 MMHG
BH CV ECHO MEAS - AO MEAN PG: 11 MMHG
BH CV ECHO MEAS - AO V2 MAX: 209.9 CM/SEC
BH CV ECHO MEAS - AO V2 MEAN: 160.4 CM/SEC
BH CV ECHO MEAS - AO V2 VTI: 48.6 CM
BH CV ECHO MEAS - AVA(I,A): 1.7 CM^2
BH CV ECHO MEAS - AVA(I,D): 1.7 CM^2
BH CV ECHO MEAS - AVA(V,A): 1.7 CM^2
BH CV ECHO MEAS - AVA(V,D): 1.7 CM^2
BH CV ECHO MEAS - BSA(HAYCOCK): 1.6 M^2
BH CV ECHO MEAS - BSA: 1.6 M^2
BH CV ECHO MEAS - BZI_BMI: 20.6 KILOGRAMS/M^2
BH CV ECHO MEAS - BZI_METRIC_HEIGHT: 162.6 CM
BH CV ECHO MEAS - BZI_METRIC_WEIGHT: 54.4 KG
BH CV ECHO MEAS - EDV(MOD-SP2): 55 ML
BH CV ECHO MEAS - EDV(MOD-SP4): 40 ML
BH CV ECHO MEAS - EDV(TEICH): 67.3 ML
BH CV ECHO MEAS - EF(CUBED): 72.7 %
BH CV ECHO MEAS - EF(MOD-BP): 56 %
BH CV ECHO MEAS - EF(MOD-SP2): 54.5 %
BH CV ECHO MEAS - EF(MOD-SP4): 57.5 %
BH CV ECHO MEAS - EF(TEICH): 65.1 %
BH CV ECHO MEAS - ESV(MOD-SP2): 25 ML
BH CV ECHO MEAS - ESV(MOD-SP4): 17 ML
BH CV ECHO MEAS - ESV(TEICH): 23.5 ML
BH CV ECHO MEAS - FS: 35.2 %
BH CV ECHO MEAS - IVS/LVPW: 1.2
BH CV ECHO MEAS - IVSD: 1 CM
BH CV ECHO MEAS - LAT PEAK E' VEL: 10 CM/SEC
BH CV ECHO MEAS - LV DIASTOLIC VOL/BSA (35-75): 25.4 ML/M^2
BH CV ECHO MEAS - LV MASS(C)D: 140.2 GRAMS
BH CV ECHO MEAS - LV MASS(C)DI: 89.1 GRAMS/M^2
BH CV ECHO MEAS - LV MAX PG: 6.2 MMHG
BH CV ECHO MEAS - LV MEAN PG: 4.5 MMHG
BH CV ECHO MEAS - LV SYSTOLIC VOL/BSA (12-30): 10.8 ML/M^2
BH CV ECHO MEAS - LV V1 MAX: 124.2 CM/SEC
BH CV ECHO MEAS - LV V1 MEAN: 103.8 CM/SEC
BH CV ECHO MEAS - LV V1 VTI: 28.1 CM
BH CV ECHO MEAS - LVIDD: 3.9 CM
BH CV ECHO MEAS - LVIDS: 2.6 CM
BH CV ECHO MEAS - LVLD AP2: 6.9 CM
BH CV ECHO MEAS - LVLD AP4: 6.9 CM
BH CV ECHO MEAS - LVLS AP2: 6.4 CM
BH CV ECHO MEAS - LVLS AP4: 5.9 CM
BH CV ECHO MEAS - LVOT AREA (M): 2.8 CM^2
BH CV ECHO MEAS - LVOT AREA: 3 CM^2
BH CV ECHO MEAS - LVOT DIAM: 1.9 CM
BH CV ECHO MEAS - LVPWD: 1 CM
BH CV ECHO MEAS - MED PEAK E' VEL: 9 CM/SEC
BH CV ECHO MEAS - MR MAX PG: 65.3 MMHG
BH CV ECHO MEAS - MR MAX VEL: 404.2 CM/SEC
BH CV ECHO MEAS - MV DEC SLOPE: 735.6 CM/SEC^2
BH CV ECHO MEAS - MV DEC TIME: 0.18 SEC
BH CV ECHO MEAS - MV E MAX VEL: 129.5 CM/SEC
BH CV ECHO MEAS - MV MAX PG: 6.1 MMHG
BH CV ECHO MEAS - MV MEAN PG: 1.6 MMHG
BH CV ECHO MEAS - MV P1/2T MAX VEL: 129.5 CM/SEC
BH CV ECHO MEAS - MV P1/2T: 51.6 MSEC
BH CV ECHO MEAS - MV V2 MAX: 123.1 CM/SEC
BH CV ECHO MEAS - MV V2 MEAN: 53.9 CM/SEC
BH CV ECHO MEAS - MV V2 VTI: 30 CM
BH CV ECHO MEAS - MVA P1/2T LCG: 1.7 CM^2
BH CV ECHO MEAS - MVA(P1/2T): 4.3 CM^2
BH CV ECHO MEAS - MVA(VTI): 2.8 CM^2
BH CV ECHO MEAS - PA ACC TIME: 0.15 SEC
BH CV ECHO MEAS - PA MAX PG (FULL): 0.34 MMHG
BH CV ECHO MEAS - PA MAX PG: 2.8 MMHG
BH CV ECHO MEAS - PA PR(ACCEL): 10.9 MMHG
BH CV ECHO MEAS - PA V2 MAX: 83.4 CM/SEC
BH CV ECHO MEAS - PVA(V,A): 1.7 CM^2
BH CV ECHO MEAS - PVA(V,D): 1.7 CM^2
BH CV ECHO MEAS - QP/QS: 0.33
BH CV ECHO MEAS - RAP SYSTOLE: 8 MMHG
BH CV ECHO MEAS - RV MAX PG: 2.4 MMHG
BH CV ECHO MEAS - RV MEAN PG: 1.4 MMHG
BH CV ECHO MEAS - RV V1 MAX: 78.1 CM/SEC
BH CV ECHO MEAS - RV V1 MEAN: 55.1 CM/SEC
BH CV ECHO MEAS - RV V1 VTI: 15.3 CM
BH CV ECHO MEAS - RVOT AREA: 1.8 CM^2
BH CV ECHO MEAS - RVOT DIAM: 1.5 CM
BH CV ECHO MEAS - RVSP: 37 MMHG
BH CV ECHO MEAS - SI(CUBED): 28.1 ML/M^2
BH CV ECHO MEAS - SI(LVOT): 52.7 ML/M^2
BH CV ECHO MEAS - SI(MOD-SP2): 19.1 ML/M^2
BH CV ECHO MEAS - SI(MOD-SP4): 14.6 ML/M^2
BH CV ECHO MEAS - SI(TEICH): 27.8 ML/M^2
BH CV ECHO MEAS - SUP REN AO DIAM: 2.2 CM
BH CV ECHO MEAS - SV(CUBED): 44.3 ML
BH CV ECHO MEAS - SV(LVOT): 83 ML
BH CV ECHO MEAS - SV(MOD-SP2): 30 ML
BH CV ECHO MEAS - SV(MOD-SP4): 23 ML
BH CV ECHO MEAS - SV(RVOT): 27.2 ML
BH CV ECHO MEAS - SV(TEICH): 43.8 ML
BH CV ECHO MEAS - TAPSE (>1.6): 2.2 CM2
BH CV ECHO MEAS - TR MAX VEL: 268.8 CM/SEC
BH CV ECHO MEASUREMENTS AVERAGE E/E' RATIO: 13.63
BH CV XLRA - RV BASE: 2.8 CM
BH CV XLRA - TDI S': 10 CM/SEC
BUN BLD-MCNC: 12 MG/DL (ref 8–23)
BUN/CREAT SERPL: 13.6 (ref 7–25)
CALCIUM SPEC-SCNC: 9.1 MG/DL (ref 8.6–10.5)
CHLORIDE SERPL-SCNC: 103 MMOL/L (ref 98–107)
CO2 SERPL-SCNC: 24.3 MMOL/L (ref 22–29)
CREAT BLD-MCNC: 0.88 MG/DL (ref 0.57–1)
DEPRECATED RDW RBC AUTO: 45.3 FL (ref 37–54)
EOSINOPHIL # BLD AUTO: 0.12 10*3/MM3 (ref 0–0.4)
EOSINOPHIL NFR BLD AUTO: 1.9 % (ref 0.3–6.2)
ERYTHROCYTE [DISTWIDTH] IN BLOOD BY AUTOMATED COUNT: 13.2 % (ref 12.3–15.4)
GFR SERPL CREATININE-BSD FRML MDRD: 62 ML/MIN/1.73
GLUCOSE BLD-MCNC: 107 MG/DL (ref 65–99)
HCT VFR BLD AUTO: 39.2 % (ref 34–46.6)
HGB BLD-MCNC: 12.7 G/DL (ref 12–15.9)
IMM GRANULOCYTES # BLD AUTO: 0.04 10*3/MM3 (ref 0–0.05)
IMM GRANULOCYTES NFR BLD AUTO: 0.6 % (ref 0–0.5)
LEFT ATRIUM VOLUME INDEX: 40 ML/M2
LV EF 2D ECHO EST: 56 %
LYMPHOCYTES # BLD AUTO: 1.52 10*3/MM3 (ref 0.7–3.1)
LYMPHOCYTES NFR BLD AUTO: 24.6 % (ref 19.6–45.3)
MCH RBC QN AUTO: 29.8 PG (ref 26.6–33)
MCHC RBC AUTO-ENTMCNC: 32.4 G/DL (ref 31.5–35.7)
MCV RBC AUTO: 92 FL (ref 79–97)
MONOCYTES # BLD AUTO: 0.54 10*3/MM3 (ref 0.1–0.9)
MONOCYTES NFR BLD AUTO: 8.8 % (ref 5–12)
NEUTROPHILS # BLD AUTO: 3.92 10*3/MM3 (ref 1.7–7)
NEUTROPHILS NFR BLD AUTO: 63.6 % (ref 42.7–76)
NRBC BLD AUTO-RTO: 0 /100 WBC (ref 0–0.2)
PLATELET # BLD AUTO: 213 10*3/MM3 (ref 140–450)
PMV BLD AUTO: 8.3 FL (ref 6–12)
POTASSIUM BLD-SCNC: 4.3 MMOL/L (ref 3.5–5.2)
RBC # BLD AUTO: 4.26 10*6/MM3 (ref 3.77–5.28)
SINUS: 3 CM
SODIUM BLD-SCNC: 141 MMOL/L (ref 136–145)
STJ: 2.7 CM
WBC NRBC COR # BLD: 6.17 10*3/MM3 (ref 3.4–10.8)

## 2019-10-23 PROCEDURE — 80048 BASIC METABOLIC PNL TOTAL CA: CPT

## 2019-10-23 PROCEDURE — 93306 TTE W/DOPPLER COMPLETE: CPT

## 2019-10-23 PROCEDURE — 85025 COMPLETE CBC W/AUTO DIFF WBC: CPT

## 2019-10-23 PROCEDURE — 36415 COLL VENOUS BLD VENIPUNCTURE: CPT

## 2019-10-23 PROCEDURE — 99214 OFFICE O/P EST MOD 30 MIN: CPT | Performed by: INTERNAL MEDICINE

## 2019-10-23 PROCEDURE — 93000 ELECTROCARDIOGRAM COMPLETE: CPT | Performed by: INTERNAL MEDICINE

## 2019-10-23 PROCEDURE — 93306 TTE W/DOPPLER COMPLETE: CPT | Performed by: INTERNAL MEDICINE

## 2019-10-23 NOTE — PROGRESS NOTES
[facility] Follow Up Office Note     Encounter Date:10/23/19  Patient:Tequila Vargas  :1935  MRN:[id number]      Chief Complaint: Follow up aortic stenosis s/p TAVR  Chief Complaint   Patient presents with   • s/p TAVR       History of Presenting Illness:    Ms. Vargas is a 84-year-old woman with past medical history notable for permanent atrial fibrillation and aortic stenosis status post TAVR with 23 mm Wes 3 aortic valve bioprosthesis on 10/30/2018 who presents today for her one-year follow-up regarding her aortic valve replacement.  Overall she continues to do very well with regards to her aortic stenosis symptoms.  They have all but resolved since replacement of her aortic valve.  She denies any significant dyspnea on exertion or shortness of breath.  She has been maintained on aspirin and Eliquis for her atrial fibrillation and denies any bleeding issues with her anticoagulation.  She denies any issues with her prior access sites for her TAVR.  She has made excellent recovery and back to her prior baseline.  She denies any issues with her atrial fibrillation such as rapid rate, palpitations, or TIA or strokelike symptoms.  In general she feels well and denies any significant complaints.    Review of Systems:  Review of Systems   Constitution: Negative.   HENT: Positive for congestion.    Eyes: Negative.    Cardiovascular: Negative.    Respiratory: Negative.    Endocrine: Negative.    Hematologic/Lymphatic: Negative.    Musculoskeletal: Negative.    Gastrointestinal: Negative.    Genitourinary: Negative.    Neurological: Negative.    Psychiatric/Behavioral: Negative.    Allergic/Immunologic: Negative.        Current Outpatient Medications on File Prior to Visit   Medication Sig Dispense Refill   • amoxicillin (AMOXIL) 500 MG capsule Take 4 capsules by mouth See Admin Instructions. 4 capsules 1 hour prior to procedure 4 capsule 2   • apixaban (ELIQUIS) 2.5 MG tablet tablet Take 1 tablet  by mouth Every 12 (Twelve) Hours. 60 tablet 11   • aspirin 81 MG EC tablet Take 1 tablet by mouth Daily. 90 tablet 3   • digoxin (LANOXIN) 250 MCG tablet Take 1 tablet by mouth Daily. 30 tablet 5   • metoprolol tartrate (LOPRESSOR) 25 MG tablet Take 1 tablet by mouth 2 (Two) Times a Day. 60 tablet 11     No current facility-administered medications on file prior to visit.        No Known Allergies    Past Medical History:   Diagnosis Date   • Aortic stenosis     Status post TAVR 10/30/2018 (23 mm Wes 3 valve)   • Atrial fibrillation (CMS/HCC)    • Chronic back pain    • Colon polyps    • Osteoporosis    • Rheumatic fever     As Child   • S/P TAVR (transcatheter aortic valve replacement)    • Seasonal allergies    • Spinal stenosis    • Squamous cell carcinoma     Nose       Past Surgical History:   Procedure Laterality Date   • AORTIC VALVE REPAIR/REPLACEMENT N/A 10/30/2018    Procedure: TRANSFEMORAL TRANSCATHETER AORTIC VALVE REPLACEMENT WITH POSSIBLE BAIL OUT OPEN HEART CARDIAC SURGERY with TTE;  Surgeon: Black Hagen MD;  Location: [facility] OR 18/19;  Service: Cardiothoracic   • APPENDECTOMY     • CARDIAC CATHETERIZATION Left 10/21/2018    Procedure: Cardiac Catheterization/Vascular Study;  Surgeon: Kam Sosa MD;  Location: [facility] CATH INVASIVE LOCATION;  Service: Cardiology   • CARDIAC CATHETERIZATION N/A 10/21/2018    Procedure: Left Heart Cath;  Surgeon: Kam Sosa MD;  Location: [facility] INVASIVE LOCATION;  Service: Cardiology   • CARDIAC CATHETERIZATION N/A 10/21/2018    Procedure: Coronary angiography;  Surgeon: Kam Sosa MD;  Location: [facility] CATH INVASIVE LOCATION;  Service: Cardiology   • CARDIAC CATHETERIZATION N/A 10/21/2018    Procedure: Right Heart Cath;  Surgeon: Kam Sosa MD;  Location: [facility] INVASIVE LOCATION;  Service: Cardiology   • COLONOSCOPY     • ELBOW ARTHROPLASTY Right    • EXCISION MASS HEAD/NECK N/A 11/28/2017    Procedure: EXCISION ACTINIC KERATOSIS NASAL  "TIP;  Surgeon: Maurine Waterhouse, MD;  Location: [facility] OR [facility] [place] [place];  Service:    • HAND SURGERY Right     excision of tendon cyst; ganglion cyst removal   • HYSTERECTOMY      s/p partial hysterectomy (one ovary intact - although she does not know which one).   • MYRINGOTOMY Left     WITH TUBE PLACEMENT. SINCE REMOVED   • TOTAL KNEE ARTHROPLASTY Right    • TOTAL KNEE ARTHROPLASTY REVISION Right        Social History     Socioeconomic History   • Marital status:      Spouse name: Not on file   • Number of children: Not on file   • Years of education: Not on file   • Highest education level: Not on file   Tobacco Use   • Smoking status: Never Smoker   • Smokeless tobacco: Never Used   Substance and Sexual Activity   • Alcohol use: No     Comment: no caffeine   • Drug use: No       Family History   Problem Relation Age of Onset   • Colon cancer Father    • Heart failure Father    • Heart disease Sister         s/p valve replacement (valve unknown)   • Colon cancer Brother    • Malig Hyperthermia Neg Hx        The following portions of the patient's history were reviewed and updated as appropriate: allergies, current medications, past family history, past medical history, past social history, past surgical history and problem list.       Objective:       Vitals:    10/23/19 1117   BP: 130/70   Pulse: 58   Weight: 55.7 kg (122 lb 12.8 oz)   Height: 162.6 cm (64\")         Physical Exam:  Constitutional: Well appearing, well developed, no acute distress   HENT: Oropharynx clear and membrane moist  Eyes: Normal conjunctiva, no sclera icterus.  Neck: Supple, no carotid bruit bilaterally.  Cardiovascular: Irregularly irregular rhythm, early peaking murmur over RUSB, no lower extremity edema.  Pulmonary: Normal respiratory effort, no lung sounds, no wheezing.  Abdominal: Soft, nontender, no hepatosplenomegaly, liver is non-pulsatile.  Neurological: Alert and orient x 3.   Skin: Warm, dry, no ecchymosis, no rash.  Psych: " Appropriate mood and affect. Normal judgment and insight.       Lab Results   Component Value Date    GLUCOSE 107 (H) 10/23/2019    BUN 12 10/23/2019    CREATININE 0.88 10/23/2019    EGFRIFNONA 62 10/23/2019    BCR 13.6 10/23/2019    K 4.3 10/23/2019    CO2 24.3 10/23/2019    CALCIUM 9.1 10/23/2019    ALBUMIN 4.30 01/02/2019    AST 19 01/02/2019    ALT 14 01/02/2019       Lab Results   Component Value Date    WBC 6.17 10/23/2019    HGB 12.7 10/23/2019    HCT 39.2 10/23/2019    MCV 92.0 10/23/2019     10/23/2019       Lab Results   Component Value Date    TROPONINT <0.010 11/15/2018       Lab Results   Component Value Date    TSH 2.220 10/18/2018         ECG 12 Lead  Date/Time: 10/23/2019 12:12 PM  Performed by: Agapito Menendez MD  Authorized by: Agapito Menendez MD   Comparison: compared with previous ECG from 1/2/2019  Similar to previous ECG  Rhythm: atrial fibrillation  Other findings: non-specific ST-T wave changes    Clinical impression: abnormal EKG        Echocardiogram 10/23/2019 with images reviewed by myself:  · Normal left ventricular systolic function  · There is a well-seated 23 mm Wes 3 aortic bioprostheses which is functioning normally.  There is no perivalvular or valvular regurgitation.  Aortic valve area calculates to 1.7 cm2 with a dimensionless index of 0.6 and a mean gradient of 11 mm across the aortic valve.  · No significant change when compared to her prior echocardiogram    15 Foot Walk test 10/23/2019:  3.2 sec    KCQ-12 10/23/2019:  95%     Assessment:          Diagnosis Plan   1. Nonrheumatic aortic valve stenosis     2. S/P TAVR (transcatheter aortic valve replacement)     3. Permanent atrial fibrillation            Plan:     Ms. Vargas is a 84-year-old woman with past medical history notable for permanent atrial fibrillation and aortic stenosis status post TAVR with 23 mm Wes 3 aortic valve bioprosthesis on 10/30/2018 who presents today for her one-year follow-up  regarding her aortic valve replacement.  She continues to do well and in general has excellent functional capacity and New York Heart Association class I type symptoms.  I would continue to monitor her clinically.  Periodic echocardiograms would be reasonable in the future to assess valve function but I expect her valve function to continue to do well and proceed with a normal course hopefully with a lifespan of 15 years.  She is on aspirin and Eliquis for her atrial fibrillation which will also help hopefully prevent any leaflet thromboses.  In general she may follow-up with us as needed for any future valve issues but otherwise she can follow-up with her primary cardiologist Dr. Marie as scheduled.    Aortic Stenosis:  · Status post 23 mm sapient 3 aortic valve bioprosthesis 10/30/2018  · Normal functioning valve on echocardiogram reviewed by myself today with final report pending  · Would continue periodic evaluations of aortic valve function with echocardiograms if any change in clinical status  · Follow-up with valve team as needed for any valve degeneration or further questions    Permanent Atrial Fibrillation:  · Rate controlled  · Continue anticoagulation          Agapito Menendez MD  [facility]  10/23/19  11:59 AM

## 2019-10-27 NOTE — PROGRESS NOTES
Date of Office Visit: 10/08/2019  Encounter Provider: Benedicto Marie MD  Place of Service: [facility] CARDIOLOGY  Patient Name: Tequila Vargas  :1935    Chief complaint: Follow-up for TAVR, persistent atrial fibrillation.    History of Present Illness:    I again had the pleasure of seeing the patient in cardiology office on 10/8/2019.  She is a   very pleasant 84 year-old white female with a history of persistent atrial fibrillation, aortic   stenosis status post TAVR, and rheumatic fever as a child who presents for follow-up.    The patient had a long-standing history of aortic stenosis.  She presented in 2018 with shortness of breath and chest tightness.  When she came in to have an   echocardiogram performed in the office, it was discovered that she was in atrial   fibrillation.  She was admitted to the hospital and was rate controlled and placed on   Lovenox.  However, the Lovenox had to be stopped secondary to gross hematuria and   nosebleeds.  Her urological work-up was negative.  A limited echocardiogram on   10/19/2018 confirmed severe aortic stenosis with a peak gradient of 91 mmHg and a   mean gradient of 45 mmHg.  A subsequent left and right heart catheterization showed   no evidence of pulmonary hypertension and very mild nonobstructive coronary artery   disease of up to 30% in the proximal left circumflex.  She ultimately underwent a TAVR   with  placement of a 23 mm Wes 3 transcatheter aortic valve.  She eventually was   started on Eliquis as an outpatient, and had no significant bleeding issues.     The patient presents today for follow-up.  She has been doing well. She has occasional   throbbing in her left neck, although this is random and is fleeting.  She has not had any   chest discomfort.  She is still asymptomatic with regards to the atrial fibrillation.  She   has not had any recent bleeding issues on the Eliquis.  She denied any  significant   shortness of breath.    Past Medical History:   Diagnosis Date   • Aortic stenosis     Status post TAVR 10/30/2018 (23 mm Wes 3 valve)   • Atrial fibrillation (CMS/HCC)    • Chronic back pain    • Colon polyps    • Osteoporosis    • Rheumatic fever     As Child   • S/P TAVR (transcatheter aortic valve replacement)    • Seasonal allergies    • Spinal stenosis    • Squamous cell carcinoma     Nose       Past Surgical History:   Procedure Laterality Date   • AORTIC VALVE REPAIR/REPLACEMENT N/A 10/30/2018    Procedure: TRANSFEMORAL TRANSCATHETER AORTIC VALVE REPLACEMENT WITH POSSIBLE BAIL OUT OPEN HEART CARDIAC SURGERY with TTE;  Surgeon: Black Hagen MD;  Location: [facility] OR 18/19;  Service: Cardiothoracic   • APPENDECTOMY     • CARDIAC CATHETERIZATION Left 10/21/2018    Procedure: Cardiac Catheterization/Vascular Study;  Surgeon: Kam Sosa MD;  Location: [facility] CATH INVASIVE LOCATION;  Service: Cardiology   • CARDIAC CATHETERIZATION N/A 10/21/2018    Procedure: Left Heart Cath;  Surgeon: Kam Sosa MD;  Location: [facility] CATH INVASIVE LOCATION;  Service: Cardiology   • CARDIAC CATHETERIZATION N/A 10/21/2018    Procedure: Coronary angiography;  Surgeon: Kam Sosa MD;  Location: [facility] CATH INVASIVE LOCATION;  Service: Cardiology   • CARDIAC CATHETERIZATION N/A 10/21/2018    Procedure: Right Heart Cath;  Surgeon: Kam Sosa MD;  Location: [facility] CATH INVASIVE LOCATION;  Service: Cardiology   • COLONOSCOPY     • ELBOW ARTHROPLASTY Right    • EXCISION MASS HEAD/NECK N/A 11/28/2017    Procedure: EXCISION ACTINIC KERATOSIS NASAL TIP;  Surgeon: Maurine Waterhouse, MD;  Location: [facility] [place] [facility];  Service:    • HAND SURGERY Right     excision of tendon cyst; ganglion cyst removal   • HYSTERECTOMY      s/p partial hysterectomy (one ovary intact - although she does not know which one).   • MYRINGOTOMY Left     WITH TUBE PLACEMENT. SINCE REMOVED   • TOTAL KNEE ARTHROPLASTY Right    • TOTAL KNEE  "ARTHROPLASTY REVISION Right        Current Outpatient Medications on File Prior to Visit   Medication Sig Dispense Refill   • apixaban (ELIQUIS) 2.5 MG tablet tablet Take 1 tablet by mouth Every 12 (Twelve) Hours. 60 tablet 11   • aspirin 81 MG EC tablet Take 1 tablet by mouth Daily. 90 tablet 3   • metoprolol tartrate (LOPRESSOR) 25 MG tablet Take 1 tablet by mouth 2 (Two) Times a Day. 60 tablet 11     No current facility-administered medications on file prior to visit.      Allergies as of 10/08/2019   • (No Known Allergies)     Social History     Socioeconomic History   • Marital status:      Spouse name: Not on file   • Number of children: Not on file   • Years of education: Not on file   • Highest education level: Not on file   Tobacco Use   • Smoking status: Never Smoker   • Smokeless tobacco: Never Used   Substance and Sexual Activity   • Alcohol use: No     Comment: no caffeine   • Drug use: No     Family History   Problem Relation Age of Onset   • Colon cancer Father    • Heart failure Father    • Heart disease Sister         s/p valve replacement (valve unknown)   • Colon cancer Brother    • Malig Hyperthermia Neg Hx        Review of Systems   HENT: Positive for ear pain.    Cardiovascular: Positive for leg swelling.   Respiratory: Positive for cough.    All other systems reviewed and are negative.     Objective:     Vitals:    10/08/19 1443   BP: 108/72   Pulse: 63   SpO2: 98%   Weight: 54.5 kg (120 lb 3.2 oz)   Height: 162.6 cm (64.02\")     Body mass index is 20.62 kg/m².    Physical Exam   Constitutional: She is oriented to person, place, and time. She appears well-developed and well-nourished.   HENT:   Head: Normocephalic and atraumatic.   Eyes: Conjunctivae are normal.   Neck: Neck supple.   Cardiovascular: Normal rate. An irregularly irregular rhythm present. Exam reveals no gallop and no friction rub.   Murmur heard.   Systolic murmur is present with a grade of 2/6 at the upper right sternal " border.  Pulmonary/Chest: Effort normal and breath sounds normal.   Abdominal: Soft. There is no tenderness.   Musculoskeletal: She exhibits no edema.   Neurological: She is alert and oriented to person, place, and time.   Skin: Skin is warm.   Psychiatric: She has a normal mood and affect. Her behavior is normal.     Lab Review:   Procedures    Cardiac Procedures:  1. Limited echocardiogram on 10/19/2018: There was severe aortic stenosis with a   peak gradient of 91 mmHg and a mean gradient of 45 mmHg.  2. Left and right heart catheterization on 10/21/2018: The mean pulmonary pressure   was 21 mmHg.  The LAD had 10% proximal and mid disease.  The left circumflex   had 30% proximal disease.  The RCA was normal.  3. Status post placement of a 23 mm Wes 3 transcatheter aortic valve on 10/30/18  4. Echocardiogram on 1/2/2019: The ejection fraction was 57%.  Gradients across   the TAVR were normal.  There was moderate mitral annular calcification.  There was   moderate tricuspid regurgitation.    Assessment:       Diagnosis Plan   1. Longstanding persistent atrial fibrillation     2. S/P TAVR (transcatheter aortic valve replacement)       Plan:       She seems to be stable at this point.  She is completely asymptomatic from the atrial fibrillation.  Her rate has been well controlled for quite some time on the metoprolol and digoxin.  She has had no bleeding complications and specifically no hematuria or epistaxis with the Eliquis at 2.5 mg twice a day recently.  She did have bleeding issues with Lovenox in the hospital prior to starting the Eliquis.  She does know to take antibiotics prior to any dental appointments for prophylaxis for the TAVR.  I prescribed amoxicillin for her today for this purpose.  She will follow-up with Dr. Lovell for her TAVR in the future.  Otherwise, I did not change any medications today.  I will see her back in the office in 6 months.

## 2019-11-06 ENCOUNTER — TELEPHONE (OUTPATIENT)
Dept: CARDIOLOGY | Facility: CLINIC | Age: 84
End: 2019-11-06

## 2019-11-06 RX ORDER — AMOXICILLIN 500 MG/1
2000 CAPSULE ORAL SEE ADMIN INSTRUCTIONS
Qty: 4 CAPSULE | Refills: 2 | Status: SHIPPED | OUTPATIENT
Start: 2019-11-06 | End: 2023-02-13 | Stop reason: SDUPTHER

## 2019-11-06 NOTE — TELEPHONE ENCOUNTER
Called and notified Dr Pastrana    Called patient on home and cell line and left a vm on both for her to call back  Thanks  Zuleika Mejia RN  Triage nurse

## 2019-11-06 NOTE — TELEPHONE ENCOUNTER
Dr Marie,  Dr Pastrana called and he has this patient scheduled for a tooth extraction on Tuesday.  She is on eliquis and he is wondering if she can come off of it a couple days prior? Will she need antibiotics?    Hx of TAVR  Nonrheumatic aortic valve stenosis  afib    Thanks  Zuleika Mejia RN  Triage nurse    Dr Pastrana can be reached at [phone]

## 2019-11-06 NOTE — TELEPHONE ENCOUNTER
She will need to hold the Eliquis for 2 days prior to the extraction, and she should resume the day after.  She definitely needs antibiotics - I renewed her Amoxicillin to be taken 1 hour prior to the appointment.  Can you also let the patient know to be sure to take the amoxicillin?  Thanks     GM

## 2019-11-06 NOTE — TELEPHONE ENCOUNTER
Patient called office back. Gave patient instruction to hold eliquis 2 days prior and start back the day after. Let her know that her amoxicillin was refilled and she is to take it 1 hour prior to extraction. Verbalized understanding.    Althea Parker RN  [facility] Triage Nurse

## 2019-12-11 ENCOUNTER — TELEPHONE (OUTPATIENT)
Dept: CARDIOLOGY | Facility: CLINIC | Age: 84
End: 2019-12-11

## 2019-12-11 RX ORDER — POTASSIUM CHLORIDE 750 MG/1
10 TABLET, FILM COATED, EXTENDED RELEASE ORAL DAILY
Qty: 30 TABLET | Refills: 11 | Status: SHIPPED | OUTPATIENT
Start: 2019-12-11 | End: 2023-02-13

## 2019-12-11 RX ORDER — FUROSEMIDE 20 MG/1
20 TABLET ORAL DAILY
Qty: 30 TABLET | Refills: 11 | Status: SHIPPED | OUTPATIENT
Start: 2019-12-11 | End: 2022-02-07 | Stop reason: SDUPTHER

## 2019-12-11 NOTE — TELEPHONE ENCOUNTER
Discussed with dr Marie who would like the patient to elevate her legs and watch salt intake.    He would also like her to take lasix 20mg daily x 2 days then PRN with kcl 10meq daily with lasix- sent to her pharmacy.    She is to call back at the first part of next week if this issue is not resolved.  Patient notified and verbalized understanding  Zuleika Mejia RN  Triage nurse

## 2019-12-11 NOTE — TELEPHONE ENCOUNTER
12/11/19  9:17 AM  Tequila Vargas  1935  Home Phone [phone]   Mobile [phone]       Tequila Vargas is a patient of Dr Marie.  She is calling in to let you know she is having an issue with her ankles swelling she said it has been going on for the last 2-3 wks.  She is not SOA, she is not restricting her salt, and she has not tried to elevated her legs to see if it improves.  She doesn't weigh every day.    Cardiac meds  Digoxin 250mcg daily  metoprolol tartrate 25mg BID  Eliquis  Asa 81    Does she need a med adjustment?  Thanks  Zuleika Mejia RN  Triage nurse

## 2020-01-08 NOTE — DISCHARGE INSTRUCTIONS
For your newly diagnosed type 2 diabetes, we will start metformin therapy.    Please take metformin 500 mg once a day for a week first, and then increase to twice a day.    We have given you a meter and supplies today. Please check your fasting blood glucoses and let us know if these values are consistently > 150 on therapy. If so, we will need to increase your metformin to 1000 mg twice a day prior to your follow-up visit.    Please see diabetes educator for further lifestyle/diet management instruction for the diabetes.    In regards to your low testosterone, your blood counts are still high and you have sleep apnea that is currently not treating making starting testosterone therapy risky. Since you didn't see that much symptom improvement in terms of energy while on therapy vs off, I recommend that you first get your sleep apnea treated with CPAP and see if you feel better with this. At follow-up visit, if your blood counts are normal and you are still having problems with low energy and low libido, we can either start off-label clomid or low dose testosterone therapy.    I also recommend you  a vitamin D supplement from over the counter, about 1000 International Units, and take daily.    Please get lab work for us prior to your follow-up visit.     Take the following medications the morning of surgery with a small sip of water: NONE        General Instructions:  • Do not eat solid food after midnight the night before surgery.  • You may drink clear liquids day of surgery but must stop at least one hour before your hospital arrival time.  • It is beneficial for you to have a clear drink that contains carbohydrates the day of surgery.  We suggest a 12 to 20 ounce bottle of Gatorade or Powerade for non-diabetic patients or a 12 to 20 ounce bottle of G2 or Powerade Zero for diabetic patients.     Clear liquids are liquids you can see through.  Nothing red in color.     Plain water                               Sports drinks  Sodas                                   Gelatin (Jell-O)  Fruit juices without pulp such as white grape juice and apple juice  Popsicles that contain no fruit or yogurt  Tea or coffee (no cream or milk added)  Gatorade / Powerade  G2 / Powerade Zero    • Bring any papers given to you in the doctor’s office.  • Wear clean comfortable clothes and socks.  • Do not wear contact lenses or make-up.  Bring a case for your glasses.   • Remove all piercings.  Leave jewelry and any other valuables at home.  • The Pre-Admission Testing nurse will instruct you to bring medications if unable to obtain an accurate list in Pre-Admission Testing.            Preventing a Surgical Site Infection:  • For 2 to 3 days before surgery, avoid shaving with a razor because the razor can irritate skin and make it easier to develop an infection.  • The night prior to surgery sleep in a clean bed with clean clothing.  Do not allow pets to sleep with you.  • Shower on the morning of surgery using a fresh bar of anti-bacterial soap (such as Dial) and clean washcloth.  Dry with a clean towel and dress in clean clothing.  • Ask your surgeon if you will be receiving antibiotics prior to surgery.  • Make sure you, your family, and all healthcare providers clean their hands with  soap and water or an alcohol based hand  before caring for you or your wound.    Day of surgery: 11/28/2018. [facility]. ARRIVAL TIME 1230 PM  Upon arrival, a Pre-op nurse and Anesthesiologist will review your health history, obtain vital signs, and answer questions you may have.  The only belongings needed at this time will be your home medications and if applicable your C-PAP/BI-PAP machine.  If you are staying overnight your family can leave the rest of your belongings in the car and bring them to your room later.  A Pre-op nurse will start an IV and you may receive medication in preparation for surgery, including something to help you relax.  Your family will be able to see you in the Pre-op area.  While you are in surgery your family should notify the waiting room  if they leave the waiting room area and provide a contact phone number.    Please be aware that surgery does come with discomfort.  We want to make every effort to control your discomfort so please discuss any uncontrolled symptoms with your nurse.   Your doctor will most likely have prescribed pain medications.      If you are going home after surgery you will receive individualized written care instructions before being discharged.  A responsible adult must drive you to and from the hospital on the day of your surgery and stay with you for 24 hours.        If you have any questions please call Pre-Admission Testing at [phone].  Deductibles and co-payments are collected on the day of service. Please be prepared to pay the required co-pay, deductible or deposit on the day of service as defined by your plan.

## 2020-02-03 ENCOUNTER — TELEPHONE (OUTPATIENT)
Dept: CARDIOLOGY | Facility: CLINIC | Age: 85
End: 2020-02-03

## 2020-02-03 NOTE — TELEPHONE ENCOUNTER
I suspect she was feeling the atrial fibrillation.  I am going to watch this for now.  Please have her call back if this becomes a recurrent issue.  Thanks     JAYDEN

## 2020-02-03 NOTE — TELEPHONE ENCOUNTER
02/03/20  12:22 PM  Tequila Vargas  1935  Home Phone [phone]   Mobile [phone]       Tequila Vargas is a patient of Dr Marie.  She is calling in to let you know that she was woken up at 1am with a hard thumping in her chest.  It lasted about 5-6 min before resolving.  She also experienced nausea during that time.      She doesn't check hr or bp.     Patient reports feeling fine now.    Metoprolol tartrate 25mg BID  Furosemide 20mg PRN  Digoxin 25mcg daily  Eliquis    Please let me know how to proceed  Thanks  Zuleika Mejia RN  Triage nurse

## 2020-02-07 RX ORDER — DIGOXIN 250 MCG
250 TABLET ORAL
Qty: 90 TABLET | Refills: 2 | Status: SHIPPED | OUTPATIENT
Start: 2020-02-07 | End: 2020-11-10

## 2020-03-02 ENCOUNTER — TELEPHONE (OUTPATIENT)
Dept: CARDIOLOGY | Facility: CLINIC | Age: 85
End: 2020-03-02

## 2020-03-02 NOTE — TELEPHONE ENCOUNTER
30/02/2020@ 4:17 PM   Pt called wanting to know if she can take OTC medication Xyal   She has lots of seasonal allergies.   Wants to make sure it ok to take before she buys it.   Jonh ACUÑA

## 2020-08-03 ENCOUNTER — TELEPHONE (OUTPATIENT)
Dept: CARDIOLOGY | Facility: CLINIC | Age: 85
End: 2020-08-03

## 2020-08-03 NOTE — TELEPHONE ENCOUNTER
08/03/2020  Pt called stating the cost of Eliquis is too much and she can't afford it.  She is wanting to know if there is anything else that she could be changed too?  Please advise  Pt's call back # [phone]   Thanks Jonh ACUÑA

## 2020-08-03 NOTE — TELEPHONE ENCOUNTER
Really Eliquis is the best medication for her as she did have a history of severe hematuria in the past.  If cost is the primary issue, I would likely need to start her on warfarin (unfortunately).  Can you please let her know?  Thanks    GM

## 2020-11-03 ENCOUNTER — OFFICE VISIT (OUTPATIENT)
Dept: CARDIOLOGY | Facility: CLINIC | Age: 85
End: 2020-11-03

## 2020-11-03 VITALS
HEART RATE: 66 BPM | OXYGEN SATURATION: 99 % | BODY MASS INDEX: 20.77 KG/M2 | SYSTOLIC BLOOD PRESSURE: 122 MMHG | WEIGHT: 121 LBS | DIASTOLIC BLOOD PRESSURE: 72 MMHG

## 2020-11-03 DIAGNOSIS — Z95.2 HISTORY OF TRANSCATHETER AORTIC VALVE REPLACEMENT (TAVR): Primary | ICD-10-CM

## 2020-11-03 DIAGNOSIS — I48.11 LONGSTANDING PERSISTENT ATRIAL FIBRILLATION (HCC): ICD-10-CM

## 2020-11-03 PROCEDURE — 99213 OFFICE O/P EST LOW 20 MIN: CPT | Performed by: INTERNAL MEDICINE

## 2020-11-04 NOTE — PROGRESS NOTES
Date of Office Visit: 11/3/2020  Encounter Provider: Benedicto Marie MD  Place of Service: [facility] CARDIOLOGY  Patient Name: Tequila Vargas  :1935    Chief complaint: Follow-up for TAVR, persistent atrial fibrillation.    History of Present Illness:    I again had the pleasure of seeing the patient in cardiology office on 11/3/2020.  She is a   very pleasant 85 year-old white female with a history of persistent atrial fibrillation, aortic   stenosis status post TAVR, and rheumatic fever as a child who presents for follow-up.    The patient had a long-standing history of aortic stenosis.  She presented in 2018 with shortness of breath and chest tightness.  When she came in to have an   echocardiogram performed in the office, it was discovered that she was in atrial   fibrillation.  She was admitted to the hospital and was rate controlled and placed on   Lovenox.  However, the Lovenox had to be stopped secondary to gross hematuria and   nosebleeds.  Her urological work-up was negative.  A limited echocardiogram on   10/19/2018 confirmed severe aortic stenosis with a peak gradient of 91 mmHg and a   mean gradient of 45 mmHg.  A subsequent left and right heart catheterization showed   no evidence of pulmonary hypertension and very mild nonobstructive coronary artery   disease of up to 30% in the proximal left circumflex.  She ultimately underwent a TAVR   with  placement of a 23 mm Wes 3 transcatheter aortic valve.  She eventually was   started on Eliquis as an outpatient, and had no significant bleeding issues.     The patient presents today for follow-up.  Overall, she has been doing well.  She did have some edema in her ankles and feet recently, although she took Lasix for several days, and this resolved.  She only takes Lasix very sparingly now.  She has not had any chest pain or shortness of breath recently.  She is not having any issues with taking the  Eliquis.    Past Medical History:   Diagnosis Date   • Aortic stenosis     Status post TAVR 10/30/2018 (23 mm Wes 3 valve)   • Atrial fibrillation (CMS/HCC)    • Chronic back pain    • Colon polyps    • Osteoporosis    • Rheumatic fever     As Child   • S/P TAVR (transcatheter aortic valve replacement)    • Seasonal allergies    • Spinal stenosis    • Squamous cell carcinoma     Nose       Past Surgical History:   Procedure Laterality Date   • AORTIC VALVE REPAIR/REPLACEMENT N/A 10/30/2018    Procedure: TRANSFEMORAL TRANSCATHETER AORTIC VALVE REPLACEMENT WITH POSSIBLE BAIL OUT OPEN HEART CARDIAC SURGERY with TTE;  Surgeon: Black Hagen MD;  Location: [facility] OR 18/19;  Service: Cardiothoracic   • APPENDECTOMY     • CARDIAC CATHETERIZATION Left 10/21/2018    Procedure: Cardiac Catheterization/Vascular Study;  Surgeon: Kam Sosa MD;  Location: [facility] CATH INVASIVE LOCATION;  Service: Cardiology   • CARDIAC CATHETERIZATION N/A 10/21/2018    Procedure: Left Heart Cath;  Surgeon: Kam Sosa MD;  Location: [facility] CATH INVASIVE LOCATION;  Service: Cardiology   • CARDIAC CATHETERIZATION N/A 10/21/2018    Procedure: Coronary angiography;  Surgeon: Kam Sosa MD;  Location: [facility] CATH INVASIVE LOCATION;  Service: Cardiology   • CARDIAC CATHETERIZATION N/A 10/21/2018    Procedure: Right Heart Cath;  Surgeon: Kam Sosa MD;  Location: [facility] CATH INVASIVE LOCATION;  Service: Cardiology   • COLONOSCOPY     • ELBOW ARTHROPLASTY Right    • EXCISION MASS HEAD/NECK N/A 11/28/2017    Procedure: EXCISION ACTINIC KERATOSIS NASAL TIP;  Surgeon: Maurine Waterhouse, MD;  Location: [facility] OR [facility];  Service:    • HAND SURGERY Right     excision of tendon cyst; ganglion cyst removal   • HYSTERECTOMY      s/p partial hysterectomy (one ovary intact - although she does not know which one).   • MYRINGOTOMY Left     WITH TUBE PLACEMENT. SINCE REMOVED   • TOTAL KNEE ARTHROPLASTY Right    • TOTAL KNEE ARTHROPLASTY REVISION Right         Current Outpatient Medications on File Prior to Visit   Medication Sig Dispense Refill   • amoxicillin (AMOXIL) 500 MG capsule Take 4 capsules by mouth See Admin Instructions. 4 capsules 1 hour prior to procedure 4 capsule 2   • apixaban (ELIQUIS) 2.5 MG tablet tablet Take 1 tablet by mouth Every 12 (Twelve) Hours. 60 tablet 11   • aspirin 81 MG EC tablet Take 1 tablet by mouth Daily. 90 tablet 3   • digoxin (LANOXIN) 250 MCG tablet Take 1 tablet by mouth Daily. 90 tablet 2   • furosemide (LASIX) 20 MG tablet Take 1 tablet by mouth Daily. daily for the next 2 days, then PRN for edema or wt gain of 2lb or more over night or 5lbs or more in a week 30 tablet 11   • metoprolol tartrate (LOPRESSOR) 25 MG tablet Take 1 tablet by mouth 2 (Two) Times a Day. 180 tablet 2   • potassium chloride (K-DUR) 10 MEQ CR tablet Take 1 tablet by mouth Daily. Take 1 tab daily for the next 2 days with lasix, then PRN when ever she takes furosemide 30 tablet 11     No current facility-administered medications on file prior to visit.      Allergies as of 11/03/2020   • (No Known Allergies)     Social History     Socioeconomic History   • Marital status:      Spouse name: Not on file   • Number of children: Not on file   • Years of education: Not on file   • Highest education level: Not on file   Tobacco Use   • Smoking status: Never Smoker   • Smokeless tobacco: Never Used   Substance and Sexual Activity   • Alcohol use: No     Comment: no caffeine   • Drug use: No     Family History   Problem Relation Age of Onset   • Colon cancer Father    • Heart failure Father    • Heart disease Sister         s/p valve replacement (valve unknown)   • Colon cancer Brother    • Malig Hyperthermia Neg Hx        Review of Systems   Constitution: Positive for malaise/fatigue.   Cardiovascular: Positive for leg swelling.   All other systems reviewed and are negative.     Objective:     Vitals:    11/03/20 1054   BP: 122/72   Pulse: 66   SpO2:  99%   Weight: 54.9 kg (121 lb)     Body mass index is 20.77 kg/m².    Physical Exam   Constitutional: She is oriented to person, place, and time. She appears well-developed and well-nourished.   HENT:   Head: Normocephalic and atraumatic.   Eyes: Conjunctivae are normal.   Neck: Neck supple.   Cardiovascular: Normal rate. An irregularly irregular rhythm present. Exam reveals no gallop and no friction rub.   Murmur heard.   Systolic murmur is present with a grade of 2/6 at the upper right sternal border.  Pulmonary/Chest: Effort normal and breath sounds normal.   Abdominal: Soft. There is no abdominal tenderness.   Musculoskeletal:         General: No edema.   Neurological: She is alert and oriented to person, place, and time.   Skin: Skin is warm.   Psychiatric: She has a normal mood and affect. Her behavior is normal.     Lab Review:   Procedures    Cardiac Procedures:  1. Limited echocardiogram on 10/19/2018: There was severe aortic stenosis with a   peak gradient of 91 mmHg and a mean gradient of 45 mmHg.  2. Left and right heart catheterization on 10/21/2018: The mean pulmonary pressure   was 21 mmHg.  The LAD had 10% proximal and mid disease.  The left circumflex   had 30% proximal disease.  The RCA was normal.  3. Status post placement of a 23 mm Wes 3 transcatheter aortic valve on 10/30/18  4. Echocardiogram on 1/2/2019: The ejection fraction was 57%.  Gradients across   the TAVR were normal.  There was moderate mitral annular calcification.  There was   moderate tricuspid regurgitation.    Assessment:       Diagnosis Plan   1. History of transcatheter aortic valve replacement (TAVR)  Adult Transthoracic Echo Complete W/ Cont if Necessary Per Protocol   2. Longstanding persistent atrial fibrillation (CMS/HCC)  Adult Transthoracic Echo Complete W/ Cont if Necessary Per Protocol     Plan:       She remains completely asymptomatic from the atrial fibrillation.  Her rate has been well controlled for quite some  time on the metoprolol and digoxin.  She has had no bleeding complications with the Eliquis at 2.5 mg twice a day recently.  She does know to take antibiotics prior to any dental appointments for prophylaxis for the TAVR.  I am going to recheck an echocardiogram at this point to reevaluate her TAVR.  Otherwise, she will continue on the Lasix as needed for pedal edema, and this has completely resolved at this point.  I will see her back in 6 months.

## 2020-11-10 RX ORDER — DIGOXIN 250 MCG
TABLET ORAL
Qty: 90 TABLET | Refills: 0 | Status: SHIPPED | OUTPATIENT
Start: 2020-11-10 | End: 2021-02-02

## 2020-12-30 ENCOUNTER — HOSPITAL ENCOUNTER (OUTPATIENT)
Dept: CARDIOLOGY | Facility: HOSPITAL | Age: 85
Discharge: HOME | End: 2020-12-30
Admitting: INTERNAL MEDICINE

## 2020-12-30 VITALS
DIASTOLIC BLOOD PRESSURE: 78 MMHG | WEIGHT: 121 LBS | SYSTOLIC BLOOD PRESSURE: 118 MMHG | BODY MASS INDEX: 20.66 KG/M2 | HEIGHT: 64 IN

## 2020-12-30 DIAGNOSIS — Z95.2 HISTORY OF TRANSCATHETER AORTIC VALVE REPLACEMENT (TAVR): ICD-10-CM

## 2020-12-30 DIAGNOSIS — I48.11 LONGSTANDING PERSISTENT ATRIAL FIBRILLATION (HCC): ICD-10-CM

## 2020-12-30 LAB
AORTIC ARCH: 2.1 CM
AORTIC DIMENSIONLESS INDEX: 0.4 (DI)
ASCENDING AORTA: 2.8 CM
BH CV ECHO AV AORTIC VALVE AT ACCEL TIME CALCULATED: NORMAL MSEC
BH CV ECHO MEAS - ACS: 2.1 CM
BH CV ECHO MEAS - AO ACC TIME: 0.09 SEC
BH CV ECHO MEAS - AO ARCH DIAM (PROXIMAL TRANS.): 2.1 CM
BH CV ECHO MEAS - AO MAX PG (FULL): 19 MMHG
BH CV ECHO MEAS - AO MAX PG: 22.4 MMHG
BH CV ECHO MEAS - AO MEAN PG (FULL): 9.6 MMHG
BH CV ECHO MEAS - AO MEAN PG: 11.7 MMHG
BH CV ECHO MEAS - AO ROOT AREA (BSA CORRECTED): 1.4
BH CV ECHO MEAS - AO ROOT AREA: 3.6 CM^2
BH CV ECHO MEAS - AO ROOT DIAM: 2.1 CM
BH CV ECHO MEAS - AO V2 MAX: 236.5 CM/SEC
BH CV ECHO MEAS - AO V2 MEAN: 161.6 CM/SEC
BH CV ECHO MEAS - AO V2 VTI: 49.5 CM
BH CV ECHO MEAS - ASC AORTA: 2.8 CM
BH CV ECHO MEAS - AT: 92 SEC
BH CV ECHO MEAS - AVA(I,A): 0.94 CM^2
BH CV ECHO MEAS - AVA(I,D): 0.94 CM^2
BH CV ECHO MEAS - AVA(V,A): 0.87 CM^2
BH CV ECHO MEAS - AVA(V,D): 0.87 CM^2
BH CV ECHO MEAS - BSA(HAYCOCK): 1.6 M^2
BH CV ECHO MEAS - BSA: 1.6 M^2
BH CV ECHO MEAS - BZI_BMI: 20.8 KILOGRAMS/M^2
BH CV ECHO MEAS - BZI_METRIC_HEIGHT: 162.6 CM
BH CV ECHO MEAS - BZI_METRIC_WEIGHT: 54.9 KG
BH CV ECHO MEAS - EDV(MOD-SP2): 48 ML
BH CV ECHO MEAS - EDV(MOD-SP4): 46 ML
BH CV ECHO MEAS - EDV(TEICH): 75.9 ML
BH CV ECHO MEAS - EF(CUBED): 70.3 %
BH CV ECHO MEAS - EF(MOD-BP): 60.3 %
BH CV ECHO MEAS - EF(MOD-SP2): 56.3 %
BH CV ECHO MEAS - EF(MOD-SP4): 65.2 %
BH CV ECHO MEAS - EF(TEICH): 62.3 %
BH CV ECHO MEAS - ESV(MOD-SP2): 21 ML
BH CV ECHO MEAS - ESV(MOD-SP4): 16 ML
BH CV ECHO MEAS - ESV(TEICH): 28.6 ML
BH CV ECHO MEAS - FS: 33.3 %
BH CV ECHO MEAS - IVS/LVPW: 1.1
BH CV ECHO MEAS - IVSD: 0.97 CM
BH CV ECHO MEAS - LAT PEAK E' VEL: 10.9 CM/SEC
BH CV ECHO MEAS - LV DIASTOLIC VOL/BSA (35-75): 29.1 ML/M^2
BH CV ECHO MEAS - LV MASS(C)D: 122.4 GRAMS
BH CV ECHO MEAS - LV MASS(C)DI: 77.5 GRAMS/M^2
BH CV ECHO MEAS - LV MAX PG: 3.3 MMHG
BH CV ECHO MEAS - LV MEAN PG: 2 MMHG
BH CV ECHO MEAS - LV SYSTOLIC VOL/BSA (12-30): 10.1 ML/M^2
BH CV ECHO MEAS - LV V1 MAX: 91.3 CM/SEC
BH CV ECHO MEAS - LV V1 MEAN: 69.1 CM/SEC
BH CV ECHO MEAS - LV V1 VTI: 20.6 CM
BH CV ECHO MEAS - LVIDD: 4.1 CM
BH CV ECHO MEAS - LVIDS: 2.8 CM
BH CV ECHO MEAS - LVLD AP2: 6.9 CM
BH CV ECHO MEAS - LVLD AP4: 7 CM
BH CV ECHO MEAS - LVLS AP2: 5.4 CM
BH CV ECHO MEAS - LVLS AP4: 5.8 CM
BH CV ECHO MEAS - LVOT AREA (M): 2.3 CM^2
BH CV ECHO MEAS - LVOT AREA: 2.3 CM^2
BH CV ECHO MEAS - LVOT DIAM: 1.7 CM
BH CV ECHO MEAS - LVPWD: 0.91 CM
BH CV ECHO MEAS - MED PEAK E' VEL: 10.1 CM/SEC
BH CV ECHO MEAS - MR MAX PG: 60.8 MMHG
BH CV ECHO MEAS - MR MAX VEL: 389.7 CM/SEC
BH CV ECHO MEAS - MV DEC SLOPE: 826.4 CM/SEC^2
BH CV ECHO MEAS - MV DEC TIME: 0.19 SEC
BH CV ECHO MEAS - MV E MAX VEL: 125.1 CM/SEC
BH CV ECHO MEAS - MV MAX PG: 10.6 MMHG
BH CV ECHO MEAS - MV MEAN PG: 3.9 MMHG
BH CV ECHO MEAS - MV P1/2T MAX VEL: 159.3 CM/SEC
BH CV ECHO MEAS - MV P1/2T: 56.4 MSEC
BH CV ECHO MEAS - MV V2 MAX: 162.9 CM/SEC
BH CV ECHO MEAS - MV V2 MEAN: 86 CM/SEC
BH CV ECHO MEAS - MV V2 VTI: 29.2 CM
BH CV ECHO MEAS - MVA P1/2T LCG: 1.4 CM^2
BH CV ECHO MEAS - MVA(P1/2T): 3.9 CM^2
BH CV ECHO MEAS - MVA(VTI): 1.6 CM^2
BH CV ECHO MEAS - PA ACC TIME: 0.12 SEC
BH CV ECHO MEAS - PA MAX PG (FULL): 1.4 MMHG
BH CV ECHO MEAS - PA MAX PG: 3.2 MMHG
BH CV ECHO MEAS - PA PR(ACCEL): 23.1 MMHG
BH CV ECHO MEAS - PA V2 MAX: 88.9 CM/SEC
BH CV ECHO MEAS - PULM A REVS DUR: 0.07 SEC
BH CV ECHO MEAS - PULM A REVS VEL: 20.6 CM/SEC
BH CV ECHO MEAS - PULM DIAS VEL: 49.7 CM/SEC
BH CV ECHO MEAS - PULM S/D: 0.78
BH CV ECHO MEAS - PULM SYS VEL: 39 CM/SEC
BH CV ECHO MEAS - PVA(V,A): 2.1 CM^2
BH CV ECHO MEAS - PVA(V,D): 2.1 CM^2
BH CV ECHO MEAS - QP/QS: 0.78
BH CV ECHO MEAS - RAP SYSTOLE: 3 MMHG
BH CV ECHO MEAS - RV MAX PG: 1.8 MMHG
BH CV ECHO MEAS - RV MEAN PG: 1.1 MMHG
BH CV ECHO MEAS - RV V1 MAX: 67.3 CM/SEC
BH CV ECHO MEAS - RV V1 MEAN: 49.9 CM/SEC
BH CV ECHO MEAS - RV V1 VTI: 13.3 CM
BH CV ECHO MEAS - RVOT AREA: 2.7 CM^2
BH CV ECHO MEAS - RVOT DIAM: 1.9 CM
BH CV ECHO MEAS - RVSP: 44 MMHG
BH CV ECHO MEAS - SI(AO): 113.6 ML/M^2
BH CV ECHO MEAS - SI(CUBED): 31.5 ML/M^2
BH CV ECHO MEAS - SI(LVOT): 29.5 ML/M^2
BH CV ECHO MEAS - SI(MOD-SP2): 17.1 ML/M^2
BH CV ECHO MEAS - SI(MOD-SP4): 19 ML/M^2
BH CV ECHO MEAS - SI(TEICH): 30 ML/M^2
BH CV ECHO MEAS - SUP REN AO DIAM: 2 CM
BH CV ECHO MEAS - SV(AO): 179.5 ML
BH CV ECHO MEAS - SV(CUBED): 49.8 ML
BH CV ECHO MEAS - SV(LVOT): 46.6 ML
BH CV ECHO MEAS - SV(MOD-SP2): 27 ML
BH CV ECHO MEAS - SV(MOD-SP4): 30 ML
BH CV ECHO MEAS - SV(RVOT): 36.1 ML
BH CV ECHO MEAS - SV(TEICH): 47.3 ML
BH CV ECHO MEAS - TAPSE (>1.6): 1.7 CM
BH CV ECHO MEAS - TR MAX VEL: 322.1 CM/SEC
BH CV ECHO MEASUREMENTS AVERAGE E/E' RATIO: 11.91
BH CV XLRA - RV BASE: 3.5 CM
BH CV XLRA - RV LENGTH: 5.2 CM
BH CV XLRA - RV MID: 2.2 CM
BH CV XLRA - TDI S': 10.4 CM/SEC
LEFT ATRIUM VOLUME INDEX: 50 ML/M2
LV EF 2D ECHO EST: 60 %
MAXIMAL PREDICTED HEART RATE: 137 BPM
SINUS: 2.8 CM
STJ: 2.1 CM
STRESS TARGET HR: 116 BPM

## 2020-12-30 PROCEDURE — 93306 TTE W/DOPPLER COMPLETE: CPT | Performed by: INTERNAL MEDICINE

## 2020-12-30 PROCEDURE — 93306 TTE W/DOPPLER COMPLETE: CPT

## 2020-12-30 PROCEDURE — 93356 MYOCRD STRAIN IMG SPCKL TRCK: CPT

## 2020-12-30 PROCEDURE — 93356 MYOCRD STRAIN IMG SPCKL TRCK: CPT | Performed by: INTERNAL MEDICINE

## 2021-01-15 NOTE — TELEPHONE ENCOUNTER
Pt called with achy intermittent pain starting on the left side of her neck radiating to her left arm and left shoulder, no alleviating factors, OTC pain medication has not helped or OTC NSAIDS.    Pert. negs- tach, palp, soa, edema, lightheaded, dizzy, chest pain, or fatigue. No new medication, no dose changes, recent illness or antibiotic.    Pain is worse when turning her head to the left.     I spoke with GM, from her description he does not think it is cardiac in nature, and suggested the Pt contact PCP. If she is still concerned about this pain an appointment could be scheduled with ARNP. Pt agreed to contact PCP and would call for an appointment if it is worse or different.    Documentation   N/A

## 2021-02-02 RX ORDER — DIGOXIN 250 MCG
TABLET ORAL
Qty: 90 TABLET | Refills: 2 | Status: SHIPPED | OUTPATIENT
Start: 2021-02-02 | End: 2021-10-25

## 2021-04-19 RX ORDER — APIXABAN 2.5 MG/1
TABLET, FILM COATED ORAL
Qty: 180 TABLET | Refills: 0 | Status: SHIPPED | OUTPATIENT
Start: 2021-04-19 | End: 2021-07-30

## 2021-07-30 RX ORDER — APIXABAN 2.5 MG/1
TABLET, FILM COATED ORAL
Qty: 180 TABLET | Refills: 2 | Status: SHIPPED | OUTPATIENT
Start: 2021-07-30 | End: 2022-02-04 | Stop reason: SDUPTHER

## 2021-10-06 ENCOUNTER — TELEPHONE (OUTPATIENT)
Dept: CARDIOLOGY | Facility: CLINIC | Age: 86
End: 2021-10-06

## 2021-10-06 NOTE — TELEPHONE ENCOUNTER
TRIAGE RN--- please call to assess further.  Any weight gain, swelling, or signs/symptoms of fluid overload?  How long his she noticed symptoms?  Any other cardiac symptoms?  How are her heart rate and blood pressure running?    Based on the above may need sooner follow-up with LINDA Fabian or another APRN if she is full to assess further but would still keep the December follow-up with Dr. Marie as well in case it is needed.

## 2021-10-06 NOTE — TELEPHONE ENCOUNTER
Patient is having some shortness of breath with exertion, especially when walking up stairs. She is asking if she needs to be seen in office, or if this is something that can be addressed via telephone. Please advise    Patient has an upcoming appointment with JAYDEN on 12/8/2021

## 2021-10-07 ENCOUNTER — HOSPITAL ENCOUNTER (OUTPATIENT)
Dept: CARDIOLOGY | Facility: HOSPITAL | Age: 86
Discharge: HOME | End: 2021-10-07

## 2021-10-07 VITALS
TEMPERATURE: 98.5 F | HEART RATE: 95 BPM | WEIGHT: 120 LBS | DIASTOLIC BLOOD PRESSURE: 75 MMHG | BODY MASS INDEX: 22.08 KG/M2 | SYSTOLIC BLOOD PRESSURE: 116 MMHG | OXYGEN SATURATION: 97 % | HEIGHT: 62 IN

## 2021-10-07 VITALS
DIASTOLIC BLOOD PRESSURE: 75 MMHG | HEIGHT: 62 IN | BODY MASS INDEX: 22.08 KG/M2 | HEART RATE: 95 BPM | OXYGEN SATURATION: 97 % | WEIGHT: 120 LBS | SYSTOLIC BLOOD PRESSURE: 116 MMHG

## 2021-10-07 DIAGNOSIS — I48.21 PERMANENT ATRIAL FIBRILLATION (HCC): Primary | ICD-10-CM

## 2021-10-07 DIAGNOSIS — R06.02 SHORTNESS OF BREATH: ICD-10-CM

## 2021-10-07 DIAGNOSIS — R07.2 PRECORDIAL PAIN: ICD-10-CM

## 2021-10-07 LAB
ALBUMIN SERPL-MCNC: 4.3 G/DL (ref 3.5–5.2)
ALBUMIN/GLOB SERPL: 2 G/DL
ALP SERPL-CCNC: 67 U/L (ref 39–117)
ALT SERPL W P-5'-P-CCNC: 18 U/L (ref 1–33)
ANION GAP SERPL CALCULATED.3IONS-SCNC: 10.1 MMOL/L (ref 5–15)
AORTIC ARCH: 2.3 CM
AORTIC DIMENSIONLESS INDEX: 0.6 (DI)
ASCENDING AORTA: 3 CM
AST SERPL-CCNC: 23 U/L (ref 1–32)
BASOPHILS # BLD AUTO: 0.04 10*3/MM3 (ref 0–0.2)
BASOPHILS NFR BLD AUTO: 0.8 % (ref 0–1.5)
BH CV ECHO AV AORTIC VALVE AT ACCEL TIME CALCULATED: 70 MSEC
BH CV ECHO MEAS - ACS: 0.8 CM
BH CV ECHO MEAS - AO MAX PG (FULL): 13.8 MMHG
BH CV ECHO MEAS - AO MAX PG: 23 MMHG
BH CV ECHO MEAS - AO MEAN PG (FULL): 7 MMHG
BH CV ECHO MEAS - AO MEAN PG: 12 MMHG
BH CV ECHO MEAS - AO ROOT AREA (BSA CORRECTED): 1.7
BH CV ECHO MEAS - AO ROOT AREA: 5.3 CM^2
BH CV ECHO MEAS - AO ROOT DIAM: 2.6 CM
BH CV ECHO MEAS - AO V2 MAX: 240 CM/SEC
BH CV ECHO MEAS - AO V2 MEAN: 159 CM/SEC
BH CV ECHO MEAS - AO V2 VTI: 53.7 CM
BH CV ECHO MEAS - ASC AORTA: 3 CM
BH CV ECHO MEAS - AT: 0.07 SEC
BH CV ECHO MEAS - AVA(I,A): 1.4 CM^2
BH CV ECHO MEAS - AVA(I,D): 1.4 CM^2
BH CV ECHO MEAS - AVA(V,A): 1.4 CM^2
BH CV ECHO MEAS - AVA(V,D): 1.4 CM^2
BH CV ECHO MEAS - BSA(HAYCOCK): 1.5 M^2
BH CV ECHO MEAS - BSA: 1.5 M^2
BH CV ECHO MEAS - BZI_BMI: 21.9 KILOGRAMS/M^2
BH CV ECHO MEAS - BZI_METRIC_HEIGHT: 157.5 CM
BH CV ECHO MEAS - BZI_METRIC_WEIGHT: 54.4 KG
BH CV ECHO MEAS - EDV(CUBED): 39.3 ML
BH CV ECHO MEAS - EDV(MOD-SP2): 36 ML
BH CV ECHO MEAS - EDV(MOD-SP4): 32 ML
BH CV ECHO MEAS - EDV(TEICH): 47.4 ML
BH CV ECHO MEAS - EF(CUBED): 55.3 %
BH CV ECHO MEAS - EF(MOD-BP): 55.6 %
BH CV ECHO MEAS - EF(MOD-SP2): 55.6 %
BH CV ECHO MEAS - EF(MOD-SP4): 56.3 %
BH CV ECHO MEAS - EF(TEICH): 48.1 %
BH CV ECHO MEAS - ESV(CUBED): 17.6 ML
BH CV ECHO MEAS - ESV(MOD-SP2): 16 ML
BH CV ECHO MEAS - ESV(MOD-SP4): 14 ML
BH CV ECHO MEAS - ESV(TEICH): 24.6 ML
BH CV ECHO MEAS - FS: 23.5 %
BH CV ECHO MEAS - IVS/LVPW: 0.83
BH CV ECHO MEAS - IVSD: 1 CM
BH CV ECHO MEAS - LAT PEAK E' VEL: 11.4 CM/SEC
BH CV ECHO MEAS - LV DIASTOLIC VOL/BSA (35-75): 20.8 ML/M^2
BH CV ECHO MEAS - LV MASS(C)D: 114 GRAMS
BH CV ECHO MEAS - LV MASS(C)DI: 74.1 GRAMS/M^2
BH CV ECHO MEAS - LV MAX PG: 9.2 MMHG
BH CV ECHO MEAS - LV MEAN PG: 5 MMHG
BH CV ECHO MEAS - LV SYSTOLIC VOL/BSA (12-30): 9.1 ML/M^2
BH CV ECHO MEAS - LV V1 MAX: 152 CM/SEC
BH CV ECHO MEAS - LV V1 MEAN: 105 CM/SEC
BH CV ECHO MEAS - LV V1 VTI: 32.6 CM
BH CV ECHO MEAS - LVIDD: 3.4 CM
BH CV ECHO MEAS - LVIDS: 2.6 CM
BH CV ECHO MEAS - LVLD AP2: 6.3 CM
BH CV ECHO MEAS - LVLD AP4: 6.6 CM
BH CV ECHO MEAS - LVLS AP2: 5.2 CM
BH CV ECHO MEAS - LVLS AP4: 5.6 CM
BH CV ECHO MEAS - LVOT AREA (M): 2.3 CM^2
BH CV ECHO MEAS - LVOT AREA: 2.3 CM^2
BH CV ECHO MEAS - LVOT DIAM: 1.7 CM
BH CV ECHO MEAS - LVPWD: 1.2 CM
BH CV ECHO MEAS - MED PEAK E' VEL: 8.1 CM/SEC
BH CV ECHO MEAS - MR MAX PG: 47.3 MMHG
BH CV ECHO MEAS - MR MAX VEL: 344 CM/SEC
BH CV ECHO MEAS - MV DEC SLOPE: 717 CM/SEC^2
BH CV ECHO MEAS - MV DEC TIME: 0.21 SEC
BH CV ECHO MEAS - MV E MAX VEL: 138 CM/SEC
BH CV ECHO MEAS - MV MAX PG: 9.1 MMHG
BH CV ECHO MEAS - MV MEAN PG: 3 MMHG
BH CV ECHO MEAS - MV P1/2T MAX VEL: 160 CM/SEC
BH CV ECHO MEAS - MV P1/2T: 65.4 MSEC
BH CV ECHO MEAS - MV V2 MAX: 151 CM/SEC
BH CV ECHO MEAS - MV V2 MEAN: 79.5 CM/SEC
BH CV ECHO MEAS - MV V2 VTI: 34.2 CM
BH CV ECHO MEAS - MVA P1/2T LCG: 1.4 CM^2
BH CV ECHO MEAS - MVA(P1/2T): 3.4 CM^2
BH CV ECHO MEAS - MVA(VTI): 2.2 CM^2
BH CV ECHO MEAS - PA ACC TIME: 0.19 SEC
BH CV ECHO MEAS - PA MAX PG (FULL): 2.4 MMHG
BH CV ECHO MEAS - PA MAX PG: 3.9 MMHG
BH CV ECHO MEAS - PA PR(ACCEL): -4.3 MMHG
BH CV ECHO MEAS - PA V2 MAX: 99.1 CM/SEC
BH CV ECHO MEAS - RAP SYSTOLE: 8 MMHG
BH CV ECHO MEAS - RV MAX PG: 1.6 MMHG
BH CV ECHO MEAS - RV MEAN PG: 1 MMHG
BH CV ECHO MEAS - RV V1 MAX: 62.7 CM/SEC
BH CV ECHO MEAS - RV V1 MEAN: 38.6 CM/SEC
BH CV ECHO MEAS - RV V1 VTI: 13 CM
BH CV ECHO MEAS - RVSP: 39.6 MMHG
BH CV ECHO MEAS - SI(AO): 185.3 ML/M^2
BH CV ECHO MEAS - SI(CUBED): 14.1 ML/M^2
BH CV ECHO MEAS - SI(LVOT): 48.1 ML/M^2
BH CV ECHO MEAS - SI(MOD-SP2): 13 ML/M^2
BH CV ECHO MEAS - SI(MOD-SP4): 11.7 ML/M^2
BH CV ECHO MEAS - SI(TEICH): 14.8 ML/M^2
BH CV ECHO MEAS - SUP REN AO DIAM: 1.5 CM
BH CV ECHO MEAS - SV(AO): 285.1 ML
BH CV ECHO MEAS - SV(CUBED): 21.7 ML
BH CV ECHO MEAS - SV(LVOT): 74 ML
BH CV ECHO MEAS - SV(MOD-SP2): 20 ML
BH CV ECHO MEAS - SV(MOD-SP4): 18 ML
BH CV ECHO MEAS - SV(TEICH): 22.8 ML
BH CV ECHO MEAS - TAPSE (>1.6): 1.9 CM
BH CV ECHO MEAS - TR MAX VEL: 281 CM/SEC
BH CV ECHO MEASUREMENTS AVERAGE E/E' RATIO: 14.15
BH CV XLRA - RV BASE: 3.1 CM
BH CV XLRA - RV LENGTH: 5.4 CM
BH CV XLRA - RV MID: 2.3 CM
BH CV XLRA - TDI S': 11.6 CM/SEC
BILIRUB SERPL-MCNC: 0.7 MG/DL (ref 0–1.2)
BUN SERPL-MCNC: 16 MG/DL (ref 8–23)
BUN/CREAT SERPL: 18 (ref 7–25)
CALCIUM SPEC-SCNC: 9.6 MG/DL (ref 8.6–10.5)
CHLORIDE SERPL-SCNC: 104 MMOL/L (ref 98–107)
CO2 SERPL-SCNC: 24.9 MMOL/L (ref 22–29)
CREAT SERPL-MCNC: 0.89 MG/DL (ref 0.57–1)
D DIMER PPP FEU-MCNC: 0.32 MCGFEU/ML (ref 0–0.49)
DEPRECATED RDW RBC AUTO: 43.6 FL (ref 37–54)
EOSINOPHIL # BLD AUTO: 0.1 10*3/MM3 (ref 0–0.4)
EOSINOPHIL NFR BLD AUTO: 2 % (ref 0.3–6.2)
ERYTHROCYTE [DISTWIDTH] IN BLOOD BY AUTOMATED COUNT: 12.9 % (ref 12.3–15.4)
GFR SERPL CREATININE-BSD FRML MDRD: 60 ML/MIN/1.73
GLOBULIN UR ELPH-MCNC: 2.1 GM/DL
GLUCOSE SERPL-MCNC: 103 MG/DL (ref 65–99)
HCT VFR BLD AUTO: 37.4 % (ref 34–46.6)
HGB BLD-MCNC: 12.2 G/DL (ref 12–15.9)
IMM GRANULOCYTES # BLD AUTO: 0.01 10*3/MM3 (ref 0–0.05)
IMM GRANULOCYTES NFR BLD AUTO: 0.2 % (ref 0–0.5)
LEFT ATRIUM VOLUME INDEX: 33 ML/M2
LYMPHOCYTES # BLD AUTO: 1.02 10*3/MM3 (ref 0.7–3.1)
LYMPHOCYTES NFR BLD AUTO: 19.9 % (ref 19.6–45.3)
MAXIMAL PREDICTED HEART RATE: 136 BPM
MCH RBC QN AUTO: 30.1 PG (ref 26.6–33)
MCHC RBC AUTO-ENTMCNC: 32.6 G/DL (ref 31.5–35.7)
MCV RBC AUTO: 92.3 FL (ref 79–97)
MONOCYTES # BLD AUTO: 0.38 10*3/MM3 (ref 0.1–0.9)
MONOCYTES NFR BLD AUTO: 7.4 % (ref 5–12)
NEUTROPHILS NFR BLD AUTO: 3.57 10*3/MM3 (ref 1.7–7)
NEUTROPHILS NFR BLD AUTO: 69.7 % (ref 42.7–76)
NRBC BLD AUTO-RTO: 0 /100 WBC (ref 0–0.2)
NT-PROBNP SERPL-MCNC: 1263 PG/ML (ref 0–1800)
PLATELET # BLD AUTO: 225 10*3/MM3 (ref 140–450)
PMV BLD AUTO: 8 FL (ref 6–12)
POTASSIUM SERPL-SCNC: 4.1 MMOL/L (ref 3.5–5.2)
PROT SERPL-MCNC: 6.4 G/DL (ref 6–8.5)
RBC # BLD AUTO: 4.05 10*6/MM3 (ref 3.77–5.28)
SINUS: 2.1 CM
SODIUM SERPL-SCNC: 139 MMOL/L (ref 136–145)
STJ: 2.6 CM
STRESS TARGET HR: 116 BPM
TROPONIN T SERPL-MCNC: <0.01 NG/ML (ref 0–0.03)
WBC # BLD AUTO: 5.12 10*3/MM3 (ref 3.4–10.8)

## 2021-10-07 PROCEDURE — 93010 ELECTROCARDIOGRAM REPORT: CPT | Performed by: NURSE PRACTITIONER

## 2021-10-07 PROCEDURE — 80053 COMPREHEN METABOLIC PANEL: CPT

## 2021-10-07 PROCEDURE — 93306 TTE W/DOPPLER COMPLETE: CPT | Performed by: INTERNAL MEDICINE

## 2021-10-07 PROCEDURE — 99215 OFFICE O/P EST HI 40 MIN: CPT | Performed by: NURSE PRACTITIONER

## 2021-10-07 PROCEDURE — 85025 COMPLETE CBC W/AUTO DIFF WBC: CPT

## 2021-10-07 PROCEDURE — 93005 ELECTROCARDIOGRAM TRACING: CPT | Performed by: NURSE PRACTITIONER

## 2021-10-07 PROCEDURE — 84484 ASSAY OF TROPONIN QUANT: CPT | Performed by: INTERNAL MEDICINE

## 2021-10-07 PROCEDURE — 85379 FIBRIN DEGRADATION QUANT: CPT | Performed by: INTERNAL MEDICINE

## 2021-10-07 PROCEDURE — 93306 TTE W/DOPPLER COMPLETE: CPT

## 2021-10-07 PROCEDURE — 36415 COLL VENOUS BLD VENIPUNCTURE: CPT

## 2021-10-07 PROCEDURE — 94760 N-INVAS EAR/PLS OXIMETRY 1: CPT

## 2021-10-07 PROCEDURE — 83880 ASSAY OF NATRIURETIC PEPTIDE: CPT | Performed by: INTERNAL MEDICINE

## 2021-10-07 RX ORDER — SODIUM CHLORIDE 0.9 % (FLUSH) 0.9 %
10 SYRINGE (ML) INJECTION AS NEEDED
Status: SHIPPED | OUTPATIENT
Start: 2021-10-07

## 2021-10-07 RX ORDER — NITROGLYCERIN 0.4 MG/1
0.4 TABLET SUBLINGUAL
Status: SHIPPED | OUTPATIENT
Start: 2021-10-07

## 2021-10-07 NOTE — TELEPHONE ENCOUNTER
Called to assess further    Wt gain 6-7 lbs over the last month  Edema- present to ankles  SOA with exertion and going up stairs, seems to be getting worse.  Some chest pain with rest and activity, that doesn't radiate  She doesn't check her hr or bp at home    She is taking furosemide 20mg every other day.  She said on the days she takes it, it helps her edema.    No apts available before Monday, with any NP.  Please advise on how to proceed  Thanks  Zuleika Mejia RN  Triage nurse

## 2021-10-07 NOTE — TELEPHONE ENCOUNTER
I spoke with Dr. Marie.  He is rounding in the hospital today so not able to see her.  I am also rounding in the hospital this afternoon so unfortunately not able to add her on.  Per Dr. Marie, patient needs to be seen in [facility] today by [facility] provider.

## 2021-10-07 NOTE — PROGRESS NOTES
"    CARDIOLOGY        Patient Name: Tequila Vargas  :1935  Age: 86 y.o.  Primary Cardiologist: Sahil Marie MD  Encounter Provider:  HATTIE SOLORZANO    Date of Service: 10/07/21          CHIEF COMPLAINT / REASON FOR OFFICE VISIT     Chest Pain, Shortness of Breath, Leg Swelling, and Abnormal Weight Gain      HISTORY OF PRESENT ILLNESS       HPI  Tequila Vargas is a 86 y.o. female who [place] [facility] [place] of dyspnea.     Pt has a  history significant for atrial fibrillation, aortic valve stenosis with history of TAVR.    Patient presents urgently to the [facility] [place] [place] with complaints of progressive dyspnea.  She states that over the past month to 2 months that she has had worsening shortness of breath with exertion.  She reports shortness of breath with walking up 1 flight of steps and walking down her driveway.  Weight gain of 8-10 pounds over the past month with ankle edema.  Denies orthopnea, PND.  She has taken furosemide over the past 3 days with improvement of ankle edema.  Denies chest pain, palpitations, worsening fatigue.      The following portions of the patient's history were reviewed and updated as appropriate: allergies, current medications, past family history, past medical history, past social history, past surgical history and problem list.      VITAL SIGNS     Visit Vitals  /75 (BP Location: Right arm, Patient Position: Sitting) Comment: 121/74 left arm sitting   Pulse 95   Temp 98.5 °F (36.9 °C) (Oral)   Ht 157.5 cm (62\")   Wt 54.4 kg (120 lb)   SpO2 97%   BMI 21.95 kg/m²         Wt Readings from Last 3 Encounters:   10/07/21 54.4 kg (120 lb)   20 54.9 kg (121 lb)   20 54.9 kg (121 lb)     Body mass index is 21.95 kg/m².      REVIEW OF SYSTEMS   Review of Systems   Constitutional: Positive for weight gain. Negative for malaise/fatigue.   Cardiovascular: Positive for dyspnea on exertion and leg swelling. Negative for chest pain.   Respiratory: Positive for " shortness of breath.    Neurological: Negative for light-headedness.   All other systems reviewed and are negative.          PHYSICAL EXAMINATION     Vitals and nursing note reviewed.   Constitutional:       Appearance: Normal appearance. Well-developed.   Eyes:      Conjunctiva/sclera: Conjunctivae normal.   Neck:      Vascular: No carotid bruit.   Pulmonary:      Breath sounds: Normal breath sounds.   Cardiovascular:      Normal rate. Regular rhythm. Normal S1 with normal intensity. Normal S2 with normal intensity.      Murmurs: There is no murmur.      No gallop. No click. No rub.   Edema:     Peripheral edema present.     Ankle: bilateral 1+ pitting edema of the ankle.     Feet: bilateral 1+ pitting edema of the feet.  Musculoskeletal: Normal range of motion. Skin:     General: Skin is warm and dry.   Neurological:      Mental Status: Alert and oriented to person, place, and time.      GCS: GCS eye subscore is 4. GCS verbal subscore is 5. GCS motor subscore is 6.   Psychiatric:         Speech: Speech normal.         Behavior: Behavior normal.         Thought Content: Thought content normal.         Judgment: Judgment normal.           REVIEWED DATA       ECG 12 Lead    Date/Time: 10/7/2021 2:35 PM  Performed by: Terrie Tierney APRN  Authorized by: Agnes Qureshi MD   Comparison: compared with previous ECG   Rhythm: atrial fibrillation  Rate: normal  BPM: 90  ST Segments: ST segments normal  T Waves: T waves normal  QRS axis: normal  Other: no other findings    Clinical impression: abnormal EKG            Cardiac Procedures:  1. Echocardiogram 10/17/2018.  LVEF 51-55%.  Mild LVH.  Normal RV cavity size and wall function.  Left atria moderately dilated.  Aortic valve pressure gradient 37 mmHg, mean pressure gradient 27 mmHg.  Mild AI.  Moderate MAC.  Mild to moderate tricuspid valve regurgitation.  Calculated RVSP 43 mmHg.  2. Cardiac catheterization 10/21/2018.  Right dominant system, mild CAD.  Left  main is normal.  Proximal LAD has 10% stenosis.  Mid LAD has 10% stenosis.  Circumflex has 30% proximal stenosis.  RCA is normal.  3. TAVR 10/30/2018. Successful deployment of a 23 mm Wes 3 transcatheter aortic heart valve.  4. Echocardiogram 10/31/2018.  LVEF 60%.  Mild LVH.  Left atrial cavity is dilated.  Left atrial volume is moderately increased.  There is a TAVR valve present with peak and mean gradients within defined limits.  Moderate MAC.  Calculated RVSP 37 mmHg.  5. Echocardiogram 1/2/2019.  LVEF 57%.  All LV wall segments contract normally.  23 mm TAVR valve present with peak and mean gradients within defined limits.  Trace MR.  Calculated RVSP 29 mmHg.  6. Echocardiogram 12/30/2020.  LVEF 60%.  Left atrial volume is severely increased.  Right atrial cavity is dilated.  23 mm TAVR valve present with peak and mean gradients within defined limits.  Moderate MR.  Estimated RVSP 35-45 mmHg.          ASSESSMENT & PLAN      Diagnosis Plan   1. Precordial pain  Cardiac Monitoring    Vital Signs - Once    Vital Signs - As Needed    Pulse Oximetry    Oxygen Therapy- Nasal Cannula; Titrate for SPO2: 92%, equal to or greater than    Insert Peripheral IV    sodium chloride 0.9 % flush 10 mL    nitroglycerin (NITROSTAT) SL tablet 0.4 mg    NPO Diet    Bathroom Privileges With Assistance    CBC & Differential    Comprehensive Metabolic Panel    Troponin T    D-Dimer    proBNP    ECG 12 Lead    Cardiac Monitoring    Vital Signs - Once    Insert Peripheral IV    NPO Diet    Bathroom Privileges With Assistance    Troponin T    Troponin T    D-Dimer    D-Dimer    proBNP    proBNP   2. Shortness of breath  Cardiac Monitoring    Vital Signs - Once    Vital Signs - As Needed    Pulse Oximetry    Oxygen Therapy- Nasal Cannula; Titrate for SPO2: 92%, equal to or greater than    Insert Peripheral IV    sodium chloride 0.9 % flush 10 mL    nitroglycerin (NITROSTAT) SL tablet 0.4 mg    NPO Diet    Bathroom Privileges With  Assistance    CBC & Differential    Comprehensive Metabolic Panel    Troponin T    D-Dimer    proBNP    ECG 12 Lead    Cardiac Monitoring    Vital Signs - Once    Insert Peripheral IV    NPO Diet    Bathroom Privileges With Assistance    Troponin T    Troponin T    D-Dimer    D-Dimer    proBNP    proBNP    Adult Transthoracic Echo Complete W/ Cont if Necessary Per Protocol     LABORATORY FINDINGS:     Results Review:    Results from last 7 days   Lab Units 10/07/21  1114   SODIUM mmol/L 139   POTASSIUM mmol/L 4.1   CHLORIDE mmol/L 104   CO2 mmol/L 24.9   BUN mg/dL 16   CREATININE mg/dL 0.89   GLUCOSE mg/dL 103*   CALCIUM mg/dL 9.6     Results from last 7 days   Lab Units 10/07/21  1114   TROPONIN T ng/mL <0.010     Results from last 7 days   Lab Units 10/07/21  1114   WBC 10*3/mm3 5.12   HEMOGLOBIN g/dL 12.2   HEMATOCRIT % 37.4   PLATELETS 10*3/mm3 225                         Glucose   Date Value Ref Range Status   10/06/2021 104 (H) 74 - 99 mg/dL Final     BUN   Date Value Ref Range Status   10/07/2021 16 8 - 23 mg/dL Final   10/06/2021 15 7 - 20 mg/dL Final     Creatinine   Date Value Ref Range Status   10/07/2021 0.89 0.57 - 1.00 mg/dL Final   10/06/2021 0.7 0.7 - 1.5 mg/dL Final     eGFR Non  Amer   Date Value Ref Range Status   10/07/2021 60 (L) >60 mL/min/1.73 Final     Sodium   Date Value Ref Range Status   10/07/2021 139 136 - 145 mmol/L Final   10/06/2021 140 137 - 145 mmol/L Final     Potassium   Date Value Ref Range Status   10/07/2021 4.1 3.5 - 5.2 mmol/L Final   10/06/2021 5.5 (H) 3.5 - 5.1 mmol/L Final     Chloride   Date Value Ref Range Status   10/07/2021 104 98 - 107 mmol/L Final   10/06/2021 103 98 - 107 mmol/L Final     CO2   Date Value Ref Range Status   10/07/2021 24.9 22.0 - 29.0 mmol/L Final     Total CO2   Date Value Ref Range Status   10/06/2021 27 22 - 30 mmol/L Final     Calcium   Date Value Ref Range Status   10/07/2021 9.6 8.6 - 10.5 mg/dL Final   10/06/2021 9.5 8.4 - 10.2 mg/dL  Final     Albumin   Date Value Ref Range Status   10/07/2021 4.30 3.50 - 5.20 g/dL Final   10/06/2021 4.2 3.5 - 5.0 g/dL Final     Total Bilirubin   Date Value Ref Range Status   10/07/2021 0.7 0.0 - 1.2 mg/dL Final   10/06/2021 0.5 0.2 - 1.3 mg/dL Final     Alkaline Phosphatase   Date Value Ref Range Status   10/07/2021 67 39 - 117 U/L Final   10/06/2021 67 38 - 126 U/L Final     AST (SGOT)   Date Value Ref Range Status   10/07/2021 23 1 - 32 U/L Final   10/06/2021 23 15 - 46 U/L Final     ALT (SGPT)   Date Value Ref Range Status   10/07/2021 18 1 - 33 U/L Final   10/06/2021 17 13 - 69 U/L Final     WBC   Date Value Ref Range Status   10/07/2021 5.12 3.40 - 10.80 10*3/mm3 Final   10/06/2021 5.22 4.5 - 11.0 10*3/uL Final     RBC   Date Value Ref Range Status   10/07/2021 4.05 3.77 - 5.28 10*6/mm3 Final   10/06/2021 3.99 (L) 4.0 - 5.2 10*6/uL Final     Hematocrit   Date Value Ref Range Status   10/07/2021 37.4 34.0 - 46.6 % Final   10/06/2021 37.9 36.0 - 46.0 % Final     MCV   Date Value Ref Range Status   10/07/2021 92.3 79.0 - 97.0 fL Final   10/06/2021 95.0 80.0 - 100.0 fL Final     MCH   Date Value Ref Range Status   10/07/2021 30.1 26.6 - 33.0 pg Final   10/06/2021 29.8 26.0 - 34.0 pg Final     MCHC   Date Value Ref Range Status   10/07/2021 32.6 31.5 - 35.7 g/dL Final   10/06/2021 31.4 31.0 - 37.0 g/dL Final     RDW   Date Value Ref Range Status   10/07/2021 12.9 12.3 - 15.4 % Final   10/06/2021 13.5 12.0 - 16.8 % Final          SUMMARY/DISCUSSION  1. Dyspnea.  For the past month to several weeks patient has had progressive dyspnea with exertion.  She has shortness of breath when walking down her driveway or up a flight of steps.  She also has lower extremity edema.  Laboratory studies including a proBNP, chemistry panel and hematology panel are unremarkable.  Patient does have lower extremity edema on exam.  Echocardiogram reveals normal systolic function and diastolic function as well as a TAVR with gradients  within normal limits.  Patient is ECG does reveal atrial fibrillation with heart rate in the 90s and I do question if she is having episodes of rapid rate which could be causing her symptoms.  We will place a 24-hour Holter to further assess her average heart rate.  Continue furosemide 20 mg/day.  2. Aortic valve stenosis with history of TAVR valve.  Gradients within normal limits on echocardiogram performed in clinic today.  3. Follow-up with me in 1 week for reevaluation of symptoms.    Time Spent: I spent 55 minutes caring for Tequila on this date of service. This time includes time spent by me in the following activities: preparing for the visit, reviewing tests, obtaining and/or reviewing a separately obtained history, performing a medically appropriate examination and/or evaluation, counseling and educating the patient/family/caregiver, ordering medications, tests, or procedures, documenting information in the medical record and independently interpreting results and communicating that information with the patient/family/caregiver.   I spent 2 minutes on the separately reported service of ECG. This time is not included in the time used to support the E/M service also reported today.          MEDICATIONS         Discharge Medications      ASK your doctor about these medications      Instructions Start Date   amoxicillin 500 MG capsule  Commonly known as: AMOXIL   2,000 mg, Oral, See Admin Instructions, 4 capsules 1 hour prior to procedure      aspirin 81 MG EC tablet   81 mg, Oral, Daily      digoxin 250 MCG tablet  Commonly known as: LANOXIN   Take 1 tablet by mouth once daily      Eliquis 2.5 MG tablet tablet  Generic drug: apixaban   TAKE 1 TABLET BY MOUTH EVERY 12 HOURS      furosemide 20 MG tablet  Commonly known as: LASIX   20 mg, Oral, Daily, daily for the next 2 days, then PRN for edema or wt gain of 2lb or more over night or 5lbs or more in a week      metoprolol tartrate 25 MG tablet  Commonly known  as: LOPRESSOR   Take 1 tablet by mouth twice daily      potassium chloride 10 MEQ CR tablet   10 mEq, Oral, Daily, Take 1 tab daily for the next 2 days with lasix, then PRN when ever she takes furosemide      vitamin D3 125 MCG (5000 UT) capsule capsule   5,000 Units, Oral, Daily                 **Dragon Disclaimer:   Much of this encounter note is an electronic transcription/translation of spoken language to printed text. The electronic translation of spoken language may permit erroneous, or at times, nonsensical words or phrases to be inadvertently transcribed. Although I have reviewed the note for such errors, some may still exist.

## 2021-10-13 ENCOUNTER — OFFICE VISIT (OUTPATIENT)
Dept: CARDIOLOGY | Facility: CLINIC | Age: 86
End: 2021-10-13

## 2021-10-13 VITALS
OXYGEN SATURATION: 100 % | DIASTOLIC BLOOD PRESSURE: 70 MMHG | HEIGHT: 64 IN | SYSTOLIC BLOOD PRESSURE: 116 MMHG | HEART RATE: 67 BPM | BODY MASS INDEX: 20.66 KG/M2 | WEIGHT: 121 LBS

## 2021-10-13 DIAGNOSIS — Z95.2 S/P TAVR (TRANSCATHETER AORTIC VALVE REPLACEMENT): ICD-10-CM

## 2021-10-13 DIAGNOSIS — R06.02 SHORTNESS OF BREATH: Primary | ICD-10-CM

## 2021-10-13 DIAGNOSIS — I35.0 NONRHEUMATIC AORTIC VALVE STENOSIS: ICD-10-CM

## 2021-10-13 DIAGNOSIS — I48.21 PERMANENT ATRIAL FIBRILLATION (HCC): ICD-10-CM

## 2021-10-13 PROCEDURE — 93000 ELECTROCARDIOGRAM COMPLETE: CPT | Performed by: NURSE PRACTITIONER

## 2021-10-13 PROCEDURE — 99214 OFFICE O/P EST MOD 30 MIN: CPT | Performed by: NURSE PRACTITIONER

## 2021-10-13 NOTE — PROGRESS NOTES
"    CARDIOLOGY        Patient Name: Tequila Vargas  :1935  Age: 86 y.o.  Primary Cardiologist: Sahil Marie MD  Encounter Provider:  LINDA Astudillo    Date of Service: 10/07/21          CHIEF COMPLAINT / REASON FOR OFFICE VISIT     Atrial Fibrillation ([facility] f/u)      HISTORY OF PRESENT ILLNESS       Atrial Fibrillation  Symptoms include shortness of breath. Symptoms are negative for chest pain. Past medical history includes atrial fibrillation.     Tequila Vargas is a 86 y.o. female who presents today for [facility] reevaluation of dyspnea    Pt has a  history significant for atrial fibrillation, aortic valve stenosis with history of TAVR.    Patient was recently evaluated by me in the [facility] for episodes of dyspnea.  She had an echocardiogram that date which revealed stable gradients across her TAVR valve.  It was recommended that she continue furosemide 20 mg/day.  There was question of her heart rates with her atrial fibrillation as it was elevated on ECG.  24-hour monitor was placed which patient turned in today.    Patient reports that now that she is taking furosemide on a daily occurrence her breathing is improved.  She reports that she was unaware how much this medication was helping her breathing.  She now only has shortness of breath when walking up steps.  Heart rate has been controlled.  She denies any chest pain, shortness of breath at rest, lower extremity edema, lightheadedness, palpitations, fatigue.      The following portions of the patient's history were reviewed and updated as appropriate: allergies, current medications, past family history, past medical history, past social history, past surgical history and problem list.      VITAL SIGNS     Visit Vitals  /70 (BP Location: Left arm, Patient Position: Sitting, Cuff Size: Adult)   Pulse 67   Ht 162.6 cm (64\")   Wt 54.9 kg (121 lb)   SpO2 100%   BMI 20.77 kg/m²         Wt Readings from Last 3 Encounters:   10/13/21 54.9 kg (121 " lb)   10/07/21 54.4 kg (120 lb)   10/07/21 54.4 kg (120 lb)     Body mass index is 20.77 kg/m².      REVIEW OF SYSTEMS   Review of Systems   Constitutional: Positive for weight gain. Negative for malaise/fatigue.   Cardiovascular: Positive for dyspnea on exertion and leg swelling. Negative for chest pain.   Respiratory: Positive for shortness of breath.    Neurological: Negative for light-headedness.   All other systems reviewed and are negative.          PHYSICAL EXAMINATION     Vitals and nursing note reviewed.   Constitutional:       Appearance: Normal appearance. Well-developed.   Eyes:      Conjunctiva/sclera: Conjunctivae normal.   Neck:      Vascular: No carotid bruit.   Pulmonary:      Breath sounds: Normal breath sounds.   Cardiovascular:      Normal rate. Regular rhythm. Normal S1 with normal intensity. Normal S2 with normal intensity.      Murmurs: There is no murmur.      No gallop. No click. No rub.   Edema:     Peripheral edema present.     Ankle: bilateral trace non-pitting edema of the ankle.     Feet: bilateral trace non-pitting edema of the feet.  Musculoskeletal: Normal range of motion. Skin:     General: Skin is warm and dry.   Neurological:      Mental Status: Alert and oriented to person, place, and time.      GCS: GCS eye subscore is 4. GCS verbal subscore is 5. GCS motor subscore is 6.   Psychiatric:         Speech: Speech normal.         Behavior: Behavior normal.         Thought Content: Thought content normal.         Judgment: Judgment normal.           REVIEWED DATA       ECG 12 Lead    Date/Time: 10/13/2021 11:45 AM  Performed by: Terrie Tierney APRN  Authorized by: Terrie Tierney APRN   Comparison: compared with previous ECG from 10/7/2021  Rhythm: atrial fibrillation  Rate: normal  BPM: 67  Conduction: conduction normal  ST Segments: ST segments normal  T Waves: T waves normal  QRS axis: normal    Clinical impression: abnormal EKG            Cardiac  Procedures:  1. Echocardiogram 10/17/2018.  LVEF 51-55%.  Mild LVH.  Normal RV cavity size and wall function.  Left atria moderately dilated.  Aortic valve pressure gradient 37 mmHg, mean pressure gradient 27 mmHg.  Mild AI.  Moderate MAC.  Mild to moderate tricuspid valve regurgitation.  Calculated RVSP 43 mmHg.  2. Cardiac catheterization 10/21/2018.  Right dominant system, mild CAD.  Left main is normal.  Proximal LAD has 10% stenosis.  Mid LAD has 10% stenosis.  Circumflex has 30% proximal stenosis.  RCA is normal.  3. TAVR 10/30/2018. Successful deployment of a 23 mm Wes 3 transcatheter aortic heart valve.  4. Echocardiogram 10/31/2018.  LVEF 60%.  Mild LVH.  Left atrial cavity is dilated.  Left atrial volume is moderately increased.  There is a TAVR valve present with peak and mean gradients within defined limits.  Moderate MAC.  Calculated RVSP 37 mmHg.  5. Echocardiogram 1/2/2019.  LVEF 57%.  All LV wall segments contract normally.  23 mm TAVR valve present with peak and mean gradients within defined limits.  Trace MR.  Calculated RVSP 29 mmHg.  6. Echocardiogram 12/30/2020.  LVEF 60%.  Left atrial volume is severely increased.  Right atrial cavity is dilated.  23 mm TAVR valve present with peak and mean gradients within defined limits.  Moderate MR.  Estimated RVSP 35-45 mmHg.  7. Echocardiogram 10/7/2021.  LVEF 55%.  TAVR valve present with normal gradients across the TAVR valve.  Mild pulmonary hypertension.  Estimated RVSP 35-45 mmHg.          ASSESSMENT & PLAN      Diagnosis Plan   1. Shortness of breath     2. Permanent atrial fibrillation (HCC)     3. Nonrheumatic aortic valve stenosis     4. S/P TAVR (transcatheter aortic valve replacement)           SUMMARY/DISCUSSION  1. Dyspnea.  Improved now with daily furosemide.  Patient encouraged to continue furosemide on a daily basis.  She will also continue on the Toprol tartrate daily.  Will await Holter monitor results.  2. PAF.  Rate controlled A.  fib on ECG today.  Heart rate was mildly elevated at last average heart rate is on her Holter monitor.  Continue digoxin 250 mcg/day, metoprolol tartrate 25 mg twice per day.  Patient anticoagulated with apixaban.  3. Aortic valve stenosis with history of TAVR valve.  Gradients within normal limits on echocardiogram performed in clinic today.  4. Follow-up with Dr. Marie as previously scheduled in December.        MEDICATIONS         Discharge Medications          Accurate as of October 13, 2021 12:05 PM. If you have any questions, ask your nurse or doctor.            Changes to Medications      Instructions Start Date   furosemide 20 MG tablet  Commonly known as: LASIX  What changed: additional instructions   20 mg, Oral, Daily, daily for the next 2 days, then PRN for edema or wt gain of 2lb or more over night or 5lbs or more in a week         Continue These Medications      Instructions Start Date   amoxicillin 500 MG capsule  Commonly known as: AMOXIL   2,000 mg, Oral, See Admin Instructions, 4 capsules 1 hour prior to procedure      aspirin 81 MG EC tablet   81 mg, Oral, Daily      digoxin 250 MCG tablet  Commonly known as: LANOXIN   Take 1 tablet by mouth once daily      Eliquis 2.5 MG tablet tablet  Generic drug: apixaban   TAKE 1 TABLET BY MOUTH EVERY 12 HOURS      metoprolol tartrate 25 MG tablet  Commonly known as: LOPRESSOR   Take 1 tablet by mouth twice daily      potassium chloride 10 MEQ CR tablet   10 mEq, Oral, Daily, Take 1 tab daily for the next 2 days with lasix, then PRN when ever she takes furosemide      vitamin D3 125 MCG (5000 UT) capsule capsule   5,000 Units, Oral, Daily                 **Dragon Disclaimer:   Much of this encounter note is an electronic transcription/translation of spoken language to printed text. The electronic translation of spoken language may permit erroneous, or at times, nonsensical words or phrases to be inadvertently transcribed. Although I have reviewed the  note for such errors, some may still exist.

## 2021-10-25 RX ORDER — DIGOXIN 250 MCG
TABLET ORAL
Qty: 90 TABLET | Refills: 0 | Status: SHIPPED | OUTPATIENT
Start: 2021-10-25 | End: 2021-10-27

## 2021-10-27 RX ORDER — DIGOXIN 250 MCG
TABLET ORAL
Qty: 90 TABLET | Refills: 0 | Status: SHIPPED | OUTPATIENT
Start: 2021-10-27 | End: 2022-02-04 | Stop reason: SDUPTHER

## 2021-12-08 ENCOUNTER — OFFICE VISIT (OUTPATIENT)
Dept: CARDIOLOGY | Facility: CLINIC | Age: 86
End: 2021-12-08

## 2021-12-08 VITALS
HEIGHT: 64 IN | DIASTOLIC BLOOD PRESSURE: 74 MMHG | WEIGHT: 123 LBS | OXYGEN SATURATION: 97 % | RESPIRATION RATE: 16 BRPM | BODY MASS INDEX: 21 KG/M2 | HEART RATE: 99 BPM | SYSTOLIC BLOOD PRESSURE: 116 MMHG

## 2021-12-08 DIAGNOSIS — Z95.2 HISTORY OF TRANSCATHETER AORTIC VALVE REPLACEMENT (TAVR): ICD-10-CM

## 2021-12-08 DIAGNOSIS — I48.11 LONGSTANDING PERSISTENT ATRIAL FIBRILLATION (HCC): Primary | ICD-10-CM

## 2021-12-08 PROCEDURE — 99213 OFFICE O/P EST LOW 20 MIN: CPT | Performed by: INTERNAL MEDICINE

## 2021-12-19 NOTE — PROGRESS NOTES
Date of Office Visit: 2021  Encounter Provider: Benedicto Marie MD  Place of Service: [facility] CARDIOLOGY  Patient Name: Tequila Vargas  :1935    Chief complaint: Follow-up for TAVR, persistent atrial fibrillation.    History of Present Illness:    I again had the pleasure of seeing the patient in cardiology office on 2021.  She is a   very pleasant 86 year-old white female with a history of persistent atrial fibrillation, aortic   stenosis status post TAVR, and rheumatic fever as a child who presents for follow-up.    The patient had a long-standing history of aortic stenosis.  She presented in 2018 with shortness of breath and chest tightness.  When she came in to have an   echocardiogram performed in the office, it was discovered that she was in atrial   fibrillation.  She was admitted to the hospital and was rate controlled and placed on   Lovenox.  However, the Lovenox had to be stopped secondary to gross hematuria and   nosebleeds.  Her urological work-up was negative.  A limited echocardiogram on   10/19/2018 confirmed severe aortic stenosis with a peak gradient of 91 mmHg and a   mean gradient of 45 mmHg.  A subsequent left and right heart catheterization showed   no evidence of pulmonary hypertension and very mild nonobstructive coronary artery   disease of up to 30% in the proximal left circumflex.  She ultimately underwent a TAVR   with  placement of a 23 mm Wes 3 transcatheter aortic valve.  She eventually was   started on Eliquis as an outpatient, and had no significant bleeding issues.     The patient presents today for follow-up.  For the most part, she continues to do well.    She has had significant fatigue is her main complaint.  She is getting a sleep apnea   evaluation.  She has been short of breath with stairs, although this is her baseline.    She is still taking Lasix as needed for swelling, although not frequently.  She is  still   taking Eliquis without bleeding issues.    Past Medical History:   Diagnosis Date   • Aortic stenosis     Status post TAVR 10/30/2018 (23 mm Wes 3 valve)   • Atrial fibrillation (HCC)    • Chronic back pain    • Colon polyps    • Osteoporosis    • Rheumatic fever     As Child   • S/P TAVR (transcatheter aortic valve replacement)    • Seasonal allergies    • Spinal stenosis    • Squamous cell carcinoma     Nose       Past Surgical History:   Procedure Laterality Date   • AORTIC VALVE REPAIR/REPLACEMENT N/A 10/30/2018    Procedure: TRANSFEMORAL TRANSCATHETER AORTIC VALVE REPLACEMENT WITH POSSIBLE BAIL OUT OPEN HEART CARDIAC SURGERY with TTE;  Surgeon: Black Hagen MD;  Location: [facility] OR 18/19;  Service: Cardiothoracic   • APPENDECTOMY     • CARDIAC CATHETERIZATION Left 10/21/2018    Procedure: Cardiac Catheterization/Vascular Study;  Surgeon: Kam Sosa MD;  Location: [facility] CATH INVASIVE LOCATION;  Service: Cardiology   • CARDIAC CATHETERIZATION N/A 10/21/2018    Procedure: Left Heart Cath;  Surgeon: Kam Sosa MD;  Location: [facility] CATH INVASIVE LOCATION;  Service: Cardiology   • CARDIAC CATHETERIZATION N/A 10/21/2018    Procedure: Coronary angiography;  Surgeon: Kam Sosa MD;  Location: [facility] CATH INVASIVE LOCATION;  Service: Cardiology   • CARDIAC CATHETERIZATION N/A 10/21/2018    Procedure: Right Heart Cath;  Surgeon: Kam Sosa MD;  Location: [facility] CATH INVASIVE LOCATION;  Service: Cardiology   • COLONOSCOPY     • ELBOW ARTHROPLASTY Right    • EXCISION MASS HEAD/NECK N/A 11/28/2017    Procedure: EXCISION ACTINIC KERATOSIS NASAL TIP;  Surgeon: Maurine Waterhouse, MD;  Location: [facility] OR [facility];  Service:    • HAND SURGERY Right     excision of tendon cyst; ganglion cyst removal   • HYSTERECTOMY      s/p partial hysterectomy (one ovary intact - although she does not know which one).   • MYRINGOTOMY Left     WITH TUBE PLACEMENT. SINCE REMOVED   • TOTAL KNEE ARTHROPLASTY Right    •  TOTAL KNEE ARTHROPLASTY REVISION Right        Current Outpatient Medications on File Prior to Visit   Medication Sig Dispense Refill   • aspirin 81 MG EC tablet Take 1 tablet by mouth Daily. 90 tablet 3   • digoxin (LANOXIN) 250 MCG tablet Take 1 tablet by mouth once daily 90 tablet 0   • Eliquis 2.5 MG tablet tablet TAKE 1 TABLET BY MOUTH EVERY 12 HOURS 180 tablet 2   • furosemide (LASIX) 20 MG tablet Take 1 tablet by mouth Daily. daily for the next 2 days, then PRN for edema or wt gain of 2lb or more over night or 5lbs or more in a week (Patient taking differently: Take 20 mg by mouth Daily. Has taken daily for the last 3 days due to LE edema. She normally takes every other day or takes 1 extra for 2lb weight gain overnight or 5lb over the week.) 30 tablet 11   • metoprolol tartrate (LOPRESSOR) 25 MG tablet Take 1 tablet by mouth twice daily 180 tablet 0   • potassium chloride (K-DUR) 10 MEQ CR tablet Take 1 tablet by mouth Daily. Take 1 tab daily for the next 2 days with lasix, then PRN when ever she takes furosemide 30 tablet 11   • vitamin D3 125 MCG (5000 UT) capsule capsule Take 5,000 Units by mouth Daily.     • amoxicillin (AMOXIL) 500 MG capsule Take 4 capsules by mouth See Admin Instructions. 4 capsules 1 hour prior to procedure 4 capsule 2     Current Facility-Administered Medications on File Prior to Visit   Medication Dose Route Frequency Provider Last Rate Last Admin   • nitroglycerin (NITROSTAT) SL tablet 0.4 mg  0.4 mg Sublingual Q5 Min PRN Agnes Qureshi MD       • sodium chloride 0.9 % flush 10 mL  10 mL Intravenous PRN Agnes Qureshi MD         Allergies as of 12/08/2021   • (No Known Allergies)     Social History     Socioeconomic History   • Marital status:    Tobacco Use   • Smoking status: Never Smoker   • Smokeless tobacco: Never Used   Vaping Use   • Vaping Use: Never used   Substance and Sexual Activity   • Alcohol use: No     Comment: no caffeine   • Drug use: No   •  "Sexual activity: Yes     Partners: Male     Family History   Problem Relation Age of Onset   • Colon cancer Father    • Heart failure Father    • Heart disease Sister         s/p valve replacement (valve unknown)   • Colon cancer Brother    • Malig Hyperthermia Neg Hx        Review of Systems   Constitutional: Positive for malaise/fatigue.   Cardiovascular: Positive for leg swelling.   All other systems reviewed and are negative.     Objective:     Vitals:    12/08/21 1140   BP: 116/74   BP Location: Right arm   Patient Position: Sitting   Cuff Size: Adult   Pulse: 99   Resp: 16   SpO2: 97%   Weight: 55.8 kg (123 lb)   Height: 162.6 cm (64\")     Body mass index is 21.11 kg/m².    Physical Exam  Constitutional:       Appearance: She is well-developed.   HENT:      Head: Normocephalic and atraumatic.   Eyes:      Conjunctiva/sclera: Conjunctivae normal.   Cardiovascular:      Rate and Rhythm: Normal rate. Rhythm irregularly irregular.      Heart sounds: Murmur heard.    Systolic murmur is present with a grade of 2/6 at the upper right sternal border.  No friction rub. No gallop.    Pulmonary:      Effort: Pulmonary effort is normal.      Breath sounds: Normal breath sounds.   Abdominal:      Palpations: Abdomen is soft.      Tenderness: There is no abdominal tenderness.   Musculoskeletal:      Cervical back: Neck supple.   Skin:     General: Skin is warm.   Neurological:      Mental Status: She is alert and oriented to person, place, and time.   Psychiatric:         Behavior: Behavior normal.       Lab Review:   Procedures    Cardiac Procedures:  1. Limited echocardiogram on 10/19/2018: There was severe aortic stenosis with a   peak gradient of 91 mmHg and a mean gradient of 45 mmHg.  2. Left and right heart catheterization on 10/21/2018: The mean pulmonary pressure   was 21 mmHg.  The LAD had 10% proximal and mid disease.  The left circumflex   had 30% proximal disease.  The RCA was normal.  3. Status post placement " of a 23 mm Wes 3 transcatheter aortic valve on 10/30/18  4. Echocardiogram on 1/2/2019: The ejection fraction was 57%.  Gradients across   the TAVR were normal.  There was moderate mitral annular calcification.  There was   moderate tricuspid regurgitation.  5.  24-hour Holter monitor on 10/7/2021: Rate controlled atrial fibrillation with an average   heart rate of 83 bpm.  6.  Echocardiogram on 10/7/2021: The ejection fraction was 56%.  There was mild   concentric LVH.  The left atrium was moderately enlarged.  There was a well-seated   TAVR with normal gradients (peak 23 mmHg, mean 12 mmHg).    Assessment:       Diagnosis Plan   1. Longstanding persistent atrial fibrillation (HCC)     2. History of transcatheter aortic valve replacement (TAVR)       Plan:       Her main issue recently has been worsening fatigue.  She is going to have an obstructive sleep apnea evaluation, which I feel is a good step.  I suspect that this is multifactorial.      She remains completely asymptomatic from the atrial fibrillation.  Her rate has been well controlled for quite some time on the metoprolol and digoxin.  Her recent Holter monitor confirmed rate controlled atrial fibrillation.  She has had no bleeding complications with the Eliquis at 2.5 mg twice a day recently.  She does know to take antibiotics prior to any dental appointments for prophylaxis for the TAVR.  Her echocardiogram from 10/7/2021 showed normal TAVR gradients.  She still does take the Lasix intermittently as needed for edema.  For now, I did not change any medications.  I will plan on seeing her back in the office in 6 months unless other issues arise.

## 2022-02-04 RX ORDER — DIGOXIN 250 MCG
250 TABLET ORAL DAILY
Qty: 90 TABLET | Refills: 0 | Status: SHIPPED | OUTPATIENT
Start: 2022-02-04 | End: 2022-04-07

## 2022-02-07 RX ORDER — FUROSEMIDE 20 MG/1
20 TABLET ORAL DAILY
Qty: 120 TABLET | Refills: 1 | Status: SHIPPED | OUTPATIENT
Start: 2022-02-07 | End: 2022-04-11

## 2022-04-07 RX ORDER — DIGOXIN 250 MCG
250 TABLET ORAL DAILY
Qty: 90 TABLET | Refills: 3 | Status: SHIPPED | OUTPATIENT
Start: 2022-04-07 | End: 2022-11-30 | Stop reason: SDUPTHER

## 2022-04-11 RX ORDER — FUROSEMIDE 20 MG/1
TABLET ORAL
Qty: 180 TABLET | Refills: 3 | Status: SHIPPED | OUTPATIENT
Start: 2022-04-11 | End: 2023-02-13 | Stop reason: SDUPTHER

## 2022-04-28 NOTE — TELEPHONE ENCOUNTER
Pt called requesting a refill for Metoprolol. She states Pharmacy said we did not approve it.   Jonh ACUÑA

## 2022-10-03 RX ORDER — APIXABAN 2.5 MG/1
TABLET, FILM COATED ORAL
Qty: 180 TABLET | Refills: 3 | Status: SHIPPED | OUTPATIENT
Start: 2022-10-03 | End: 2022-11-30 | Stop reason: SDUPTHER

## 2022-11-30 RX ORDER — DIGOXIN 250 MCG
250 TABLET ORAL DAILY
Qty: 90 TABLET | Refills: 3 | Status: SHIPPED | OUTPATIENT
Start: 2022-11-30 | End: 2023-01-25 | Stop reason: SDUPTHER

## 2023-01-25 RX ORDER — DIGOXIN 250 MCG
250 TABLET ORAL DAILY
Qty: 90 TABLET | Refills: 3 | Status: SHIPPED | OUTPATIENT
Start: 2023-01-25 | End: 2023-01-27

## 2023-01-27 RX ORDER — DIGOXIN 250 MCG
250 TABLET ORAL DAILY
Qty: 90 TABLET | Refills: 3 | Status: SHIPPED | OUTPATIENT
Start: 2023-01-27 | End: 2023-02-13 | Stop reason: SDUPTHER

## 2023-01-27 NOTE — TELEPHONE ENCOUNTER
Pt called because she wanted these medications sent to [facility] pharmacy.  I entered them and resent them.    Thank you,    Dilia Treadwell RN  Triage [facility]  01/27/23 16:20 EST

## 2023-02-13 ENCOUNTER — OFFICE VISIT (OUTPATIENT)
Dept: CARDIOLOGY | Facility: CLINIC | Age: 88
End: 2023-02-13
Payer: MEDICARE

## 2023-02-13 VITALS
WEIGHT: 124.2 LBS | DIASTOLIC BLOOD PRESSURE: 78 MMHG | BODY MASS INDEX: 21.21 KG/M2 | OXYGEN SATURATION: 99 % | HEART RATE: 78 BPM | SYSTOLIC BLOOD PRESSURE: 124 MMHG | HEIGHT: 64 IN

## 2023-02-13 DIAGNOSIS — R06.09 DOE (DYSPNEA ON EXERTION): ICD-10-CM

## 2023-02-13 DIAGNOSIS — I48.11 LONGSTANDING PERSISTENT ATRIAL FIBRILLATION: ICD-10-CM

## 2023-02-13 DIAGNOSIS — Z95.2 HISTORY OF TRANSCATHETER AORTIC VALVE REPLACEMENT (TAVR): Primary | ICD-10-CM

## 2023-02-13 PROCEDURE — 93000 ELECTROCARDIOGRAM COMPLETE: CPT | Performed by: NURSE PRACTITIONER

## 2023-02-13 PROCEDURE — 99214 OFFICE O/P EST MOD 30 MIN: CPT | Performed by: NURSE PRACTITIONER

## 2023-02-13 RX ORDER — DIGOXIN 250 MCG
250 TABLET ORAL DAILY
Qty: 90 TABLET | Refills: 3 | Status: SHIPPED | OUTPATIENT
Start: 2023-02-13

## 2023-02-13 RX ORDER — FUROSEMIDE 20 MG/1
20 TABLET ORAL DAILY
Qty: 180 TABLET | Refills: 3 | Status: SHIPPED | OUTPATIENT
Start: 2023-02-13

## 2023-02-13 RX ORDER — AMOXICILLIN 500 MG/1
2000 CAPSULE ORAL SEE ADMIN INSTRUCTIONS
Qty: 4 CAPSULE | Refills: 2 | Status: SHIPPED | OUTPATIENT
Start: 2023-02-13

## 2023-02-13 NOTE — PROGRESS NOTES
"    CARDIOLOGY        Patient Name: Tequila Vargas  :1935  Age: 87 y.o.  Primary Cardiologist: Sahil Marie MD  Encounter Provider:  LINDA Astudillo    Date of Service: 23      CHIEF COMPLAINT / REASON FOR OFFICE VISIT     Atrial Fibrillation (Overdue f/u)      HISTORY OF PRESENT ILLNESS       Atrial Fibrillation  Symptoms include shortness of breath. Symptoms are negative for chest pain. Past medical history includes atrial fibrillation.     Tequila Vargas is a 87 y.o. female who presents today for overdue reevaluation.    Pt has a  history significant for atrial fibrillation, aortic valve stenosis with history of TAVR.    Patient had a longstanding history of aortic valve stenosis secondary to rheumatic fever as a child.  In  patient presented with dyspnea and chest tightness and it was discovered that she was in atrial fibrillation.  Limited echocardiogram 2018 confirmed severe aortic valve stenosis with peak gradient 91 mmHg.  Patient ultimately underwent a TAVR with placement of 23 mm MADI 3 TAVR.    Patient presents today for follow-up.  She states that over the past several months she has been experiencing lower extremity edema, shortness of breath with minimal activities and decreased stamina and energy.  She denies any chest discomfort, shortness of breath at rest, orthopnea, PND, palpitations.  Reports compliance with all medications including diuretics.  Denies any adverse effects, specifically no bleeding with Eliquis.      The following portions of the patient's history were reviewed and updated as appropriate: allergies, current medications, past family history, past medical history, past social history, past surgical history and problem list.      VITAL SIGNS     Visit Vitals  /78 (BP Location: Left arm, Patient Position: Sitting, Cuff Size: Adult)   Pulse 78   Ht 162.6 cm (64\")   Wt 56.3 kg (124 lb 3.2 oz)   SpO2 99%   BMI 21.32 kg/m²         Wt " Readings from Last 3 Encounters:   02/13/23 56.3 kg (124 lb 3.2 oz)   12/08/21 55.8 kg (123 lb)   10/13/21 54.9 kg (121 lb)     Body mass index is 21.32 kg/m².      REVIEW OF SYSTEMS   Review of Systems   Constitutional: Positive for weight gain. Negative for malaise/fatigue.   Cardiovascular: Positive for dyspnea on exertion and leg swelling. Negative for chest pain.   Respiratory: Positive for shortness of breath.    Neurological: Negative for light-headedness.   All other systems reviewed and are negative.          PHYSICAL EXAMINATION     Vitals and nursing note reviewed.   Constitutional:       Appearance: Normal appearance. Well-developed.   Eyes:      Conjunctiva/sclera: Conjunctivae normal.   Neck:      Vascular: No carotid bruit.   Pulmonary:      Breath sounds: Normal breath sounds.   Cardiovascular:      Normal rate. Regular rhythm. Normal S1 with normal intensity. Normal S2 with normal intensity.      Murmurs: There is no murmur.      No gallop. No click. No rub.   Edema:     Peripheral edema present.     Ankle: bilateral trace non-pitting edema of the ankle.     Feet: bilateral trace non-pitting edema of the feet.  Musculoskeletal: Normal range of motion. Skin:     General: Skin is warm and dry.   Neurological:      Mental Status: Alert and oriented to person, place, and time.      GCS: GCS eye subscore is 4. GCS verbal subscore is 5. GCS motor subscore is 6.   Psychiatric:         Speech: Speech normal.         Behavior: Behavior normal.         Thought Content: Thought content normal.         Judgment: Judgment normal.           REVIEWED DATA       ECG 12 Lead    Date/Time: 2/13/2023 10:41 AM  Performed by: Terrie Tierney APRN  Authorized by: Terrie Tierney APRN   Comparison: compared with previous ECG from 10/13/2021  Rhythm: atrial fibrillation  Rate: normal  BPM: 78  Conduction: conduction normal  ST Segments: ST segments normal  T Waves: T waves normal  QRS axis: normal    Clinical  impression: abnormal EKG            Cardiac Procedures:  1. Echocardiogram 10/17/2018.  LVEF 51-55%.  Mild LVH.  Normal RV cavity size and wall function.  Left atria moderately dilated.  Aortic valve pressure gradient 37 mmHg, mean pressure gradient 27 mmHg.  Mild AI.  Moderate MAC.  Mild to moderate tricuspid valve regurgitation.  Calculated RVSP 43 mmHg.  2. Cardiac catheterization 10/21/2018.  Right dominant system, mild CAD.  Left main is normal.  Proximal LAD has 10% stenosis.  Mid LAD has 10% stenosis.  Circumflex has 30% proximal stenosis.  RCA is normal.  3. TAVR 10/30/2018. Successful deployment of a 23 mm Wes 3 transcatheter aortic heart valve.  4. Echocardiogram 10/31/2018.  LVEF 60%.  Mild LVH.  Left atrial cavity is dilated.  Left atrial volume is moderately increased.  There is a TAVR valve present with peak and mean gradients within defined limits.  Moderate MAC.  Calculated RVSP 37 mmHg.  5. Echocardiogram 1/2/2019.  LVEF 57%.  All LV wall segments contract normally.  23 mm TAVR valve present with peak and mean gradients within defined limits.  Trace MR.  Calculated RVSP 29 mmHg.  6. Echocardiogram 12/30/2020.  LVEF 60%.  Left atrial volume is severely increased.  Right atrial cavity is dilated.  23 mm TAVR valve present with peak and mean gradients within defined limits.  Moderate MR.  Estimated RVSP 35-45 mmHg.  7. Echocardiogram 10/7/2021.  LVEF 55%.  TAVR valve present with normal gradients across the TAVR valve.  Mild pulmonary hypertension.  Estimated RVSP 35-45 mmHg.          ASSESSMENT & PLAN     Diagnoses and all orders for this visit:    1. History of transcatheter aortic valve replacement (TAVR) (Primary)  · Patient had TAVR valve placed in 2018  · Gradients stable on echocardiogram October 2021  · Patient experiencing dyspnea with minimal exertion as well as signs of volume overload  · Repeat echocardiogram to further assess  -     Adult Transthoracic Echo Complete W/ Cont if Necessary  Per Protocol; Future    2. REYES (dyspnea on exertion)  · Plan as above for #1  · Increase furosemide to 40 mg/day x 3 days only then return back to 20 mg/day  -     Adult Transthoracic Echo Complete W/ Cont if Necessary Per Protocol; Future    3. Longstanding persistent atrial fibrillation (HCC)  · Rate controlled atrial fibrillation on ECG  · Continue digoxin, metoprolol tartrate  · Patient on low-dose apixaban secondary to age and weight, denies bleeding complications    Other orders  -     amoxicillin (AMOXIL) 500 MG capsule; Take 4 capsules by mouth See Admin Instructions. 4 capsules 1 hour prior to procedure  Dispense: 4 capsule; Refill: 2  -     furosemide (LASIX) 20 MG tablet; Take 1 tablet by mouth Daily.  Dispense: 180 tablet; Refill: 3  -     apixaban (Eliquis) 2.5 MG tablet tablet; Take 1 tablet by mouth Every 12 (Twelve) Hours.  Dispense: 180 tablet; Refill: 3  -     digoxin (LANOXIN) 250 MCG tablet; Take 1 tablet by mouth Daily.  Dispense: 90 tablet; Refill: 3  -     metoprolol tartrate (LOPRESSOR) 25 MG tablet; Take 1 tablet by mouth 2 (Two) Times a Day.  Dispense: 180 tablet; Refill: 3        Return in about 6 months (around 8/13/2023) for Dr. Menendez-transiioning from Dr. Marie.    Future Appointments       Provider Department Center    3/2/2023 1:15 PM [facility] OUTPATIENT ECHOCARDIOGRAPHY [facility]    8/9/2023 11:15 AM Agapito Menendez MD [facility] CARDIOLOGY [facility]            MEDICATIONS         Discharge Medications          Accurate as of February 13, 2023 11:22 AM. If you have any questions, ask your nurse or doctor.            Changes to Medications      Instructions Start Date   furosemide 20 MG tablet  Commonly known as: LASIX  What changed: See the new instructions.  Changed by: LINDA Astudillo   20 mg, Oral, Daily         Continue These Medications      Instructions Start Date   amoxicillin 500 MG capsule  Commonly known as:  AMOXIL   2,000 mg, Oral, See Admin Instructions, 4 capsules 1 hour prior to procedure      apixaban 2.5 MG tablet tablet  Commonly known as: Eliquis   2.5 mg, Oral, Every 12 Hours      aspirin 81 MG EC tablet   81 mg, Oral, Daily      digoxin 250 MCG tablet  Commonly known as: LANOXIN   250 mcg, Oral, Daily      metoprolol tartrate 25 MG tablet  Commonly known as: LOPRESSOR   25 mg, Oral, 2 Times Daily      vitamin D3 125 MCG (5000 UT) capsule capsule   5,000 Units, Oral, Daily         Stop These Medications    potassium chloride 10 MEQ CR tablet  Stopped by: LINDA Astudillo                **Dragon Disclaimer:   Much of this encounter note is an electronic transcription/translation of spoken language to printed text. The electronic translation of spoken language may permit erroneous, or at times, nonsensical words or phrases to be inadvertently transcribed. Although I have reviewed the note for such errors, some may still exist.

## 2023-03-27 ENCOUNTER — TELEPHONE (OUTPATIENT)
Dept: CARDIOLOGY | Facility: CLINIC | Age: 88
End: 2023-03-27
Payer: MEDICARE

## 2023-03-27 ENCOUNTER — HOSPITAL ENCOUNTER (OUTPATIENT)
Dept: CARDIOLOGY | Facility: HOSPITAL | Age: 88
Discharge: HOME | End: 2023-03-27
Admitting: NURSE PRACTITIONER
Payer: MEDICARE

## 2023-03-27 VITALS
BODY MASS INDEX: 21.17 KG/M2 | DIASTOLIC BLOOD PRESSURE: 74 MMHG | HEIGHT: 64 IN | SYSTOLIC BLOOD PRESSURE: 110 MMHG | HEART RATE: 84 BPM | WEIGHT: 124 LBS

## 2023-03-27 DIAGNOSIS — Z95.2 HISTORY OF TRANSCATHETER AORTIC VALVE REPLACEMENT (TAVR): ICD-10-CM

## 2023-03-27 DIAGNOSIS — R06.09 DOE (DYSPNEA ON EXERTION): ICD-10-CM

## 2023-03-27 LAB
AORTIC ARCH: 2.1 CM
AORTIC DIMENSIONLESS INDEX: 0.3 (DI)
ASCENDING AORTA: 2.8 CM
BH CV ECHO AV AORTIC VALVE AT ACCEL TIME CALCULATED: NORMAL MSEC
BH CV ECHO MEAS - ACS: 1.09 CM
BH CV ECHO MEAS - AO MAX PG: 25.9 MMHG
BH CV ECHO MEAS - AO MEAN PG: 11.1 MMHG
BH CV ECHO MEAS - AO ROOT DIAM: 2.6 CM
BH CV ECHO MEAS - AO V2 MAX: 254.5 CM/SEC
BH CV ECHO MEAS - AO V2 VTI: 51.9 CM
BH CV ECHO MEAS - AT: 82 SEC
BH CV ECHO MEAS - AVA(I,D): 0.73 CM2
BH CV ECHO MEAS - EDV(CUBED): 89.4 ML
BH CV ECHO MEAS - EDV(MOD-SP2): 44 ML
BH CV ECHO MEAS - EDV(MOD-SP4): 45 ML
BH CV ECHO MEAS - EF(MOD-BP): 54.6 %
BH CV ECHO MEAS - EF(MOD-SP2): 54.5 %
BH CV ECHO MEAS - EF(MOD-SP4): 57.8 %
BH CV ECHO MEAS - ESV(CUBED): 26.1 ML
BH CV ECHO MEAS - ESV(MOD-SP2): 20 ML
BH CV ECHO MEAS - ESV(MOD-SP4): 19 ML
BH CV ECHO MEAS - FS: 33.6 %
BH CV ECHO MEAS - IVS/LVPW: 0.99 CM
BH CV ECHO MEAS - IVSD: 0.93 CM
BH CV ECHO MEAS - LAT PEAK E' VEL: 9.4 CM/SEC
BH CV ECHO MEAS - LV DIASTOLIC VOL/BSA (35-75): 28.1 CM2
BH CV ECHO MEAS - LV MASS(C)D: 139.3 GRAMS
BH CV ECHO MEAS - LV MAX PG: 2.8 MMHG
BH CV ECHO MEAS - LV MEAN PG: 1.6 MMHG
BH CV ECHO MEAS - LV SYSTOLIC VOL/BSA (12-30): 11.9 CM2
BH CV ECHO MEAS - LV V1 MAX: 84.2 CM/SEC
BH CV ECHO MEAS - LV V1 VTI: 17 CM
BH CV ECHO MEAS - LVIDD: 4.5 CM
BH CV ECHO MEAS - LVIDS: 3 CM
BH CV ECHO MEAS - LVOT AREA: 2.23 CM2
BH CV ECHO MEAS - LVOT DIAM: 1.69 CM
BH CV ECHO MEAS - LVPWD: 0.94 CM
BH CV ECHO MEAS - MED PEAK E' VEL: 6.7 CM/SEC
BH CV ECHO MEAS - MR MAX PG: 59 MMHG
BH CV ECHO MEAS - MR MAX VEL: 384.1 CM/SEC
BH CV ECHO MEAS - MV DEC SLOPE: 466.8 CM/SEC2
BH CV ECHO MEAS - MV DEC TIME: 0.26 MSEC
BH CV ECHO MEAS - MV E MAX VEL: 131 CM/SEC
BH CV ECHO MEAS - MV MAX PG: 8.6 MMHG
BH CV ECHO MEAS - MV MEAN PG: 3.1 MMHG
BH CV ECHO MEAS - MV P1/2T: 92 MSEC
BH CV ECHO MEAS - MV V2 VTI: 29.5 CM
BH CV ECHO MEAS - MVA(P1/2T): 2.39 CM2
BH CV ECHO MEAS - MVA(VTI): 1.29 CM2
BH CV ECHO MEAS - PA ACC TIME: 0.12 SEC
BH CV ECHO MEAS - PA PR(ACCEL): 25.5 MMHG
BH CV ECHO MEAS - PA V2 MAX: 90.9 CM/SEC
BH CV ECHO MEAS - QP/QS: 0.79
BH CV ECHO MEAS - RAP SYSTOLE: 3 MMHG
BH CV ECHO MEAS - RV MAX PG: 1.98 MMHG
BH CV ECHO MEAS - RV V1 MAX: 70.3 CM/SEC
BH CV ECHO MEAS - RV V1 VTI: 13.4 CM
BH CV ECHO MEAS - RVOT DIAM: 1.69 CM
BH CV ECHO MEAS - RVSP: 37.7 MMHG
BH CV ECHO MEAS - SI(MOD-SP2): 15 ML/M2
BH CV ECHO MEAS - SI(MOD-SP4): 16.2 ML/M2
BH CV ECHO MEAS - SUP REN AO DIAM: 2.2 CM
BH CV ECHO MEAS - SV(LVOT): 38 ML
BH CV ECHO MEAS - SV(MOD-SP2): 24 ML
BH CV ECHO MEAS - SV(MOD-SP4): 26 ML
BH CV ECHO MEAS - SV(RVOT): 29.9 ML
BH CV ECHO MEAS - TAPSE (>1.6): 2 CM
BH CV ECHO MEAS - TR MAX PG: 34.7 MMHG
BH CV ECHO MEAS - TR MAX VEL: 294.5 CM/SEC
BH CV ECHO MEASUREMENTS AVERAGE E/E' RATIO: 16.27
BH CV XLRA - RV BASE: 3 CM
BH CV XLRA - RV LENGTH: 6.2 CM
BH CV XLRA - RV MID: 2.8 CM
BH CV XLRA - TDI S': 12.2 CM/SEC
LEFT ATRIUM VOLUME INDEX: 40.5 ML/M2
MAXIMAL PREDICTED HEART RATE: 135 BPM
SINUS: 2.5 CM
STJ: 2.8 CM
STRESS TARGET HR: 115 BPM

## 2023-03-27 PROCEDURE — 93306 TTE W/DOPPLER COMPLETE: CPT

## 2023-03-27 PROCEDURE — 93306 TTE W/DOPPLER COMPLETE: CPT | Performed by: INTERNAL MEDICINE

## 2023-03-27 NOTE — TELEPHONE ENCOUNTER
----- Message from LINDA Astudillo sent at 3/27/2023 10:43 AM EDT -----  Left message for patient to return call to review echocardiogram results.  Her TAVR valve is stable compared to 2021.  No signs of reduced LVEF.  She was advised to ask for a triage nurse to review these results.

## 2023-03-27 NOTE — PROGRESS NOTES
Left message for patient to return call to review echocardiogram results.  Her TAVR valve is stable compared to 2021.  No signs of reduced LVEF.  She was advised to ask for a triage nurse to review these results.

## 2023-03-28 NOTE — TELEPHONE ENCOUNTER
Attempted to contact patient. Voicemail left requesting a call back for the results. Will continue to try and reach patient.      Carolina Stuart RN  Triage [facility]

## 2023-03-29 NOTE — TELEPHONE ENCOUNTER
Left voicemail for Tequila Vargas requesting callback.    Thank you,  Ella ROBERT RN  Triage Nurse [facility]

## 2023-03-30 NOTE — TELEPHONE ENCOUNTER
Reviewed results with Tequila Vargas and patient verbalized understanding of results.    Thank you,  Ella ROBERT RN  Triage Nurse [facility] [place]

## 2023-08-23 ENCOUNTER — LAB (OUTPATIENT)
Dept: LAB | Facility: HOSPITAL | Age: 88
End: 2023-08-23
Payer: MEDICARE

## 2023-08-23 ENCOUNTER — OFFICE VISIT (OUTPATIENT)
Dept: CARDIOLOGY | Facility: CLINIC | Age: 88
End: 2023-08-23
Payer: MEDICARE

## 2023-08-23 VITALS
BODY MASS INDEX: 21.1 KG/M2 | WEIGHT: 123.6 LBS | HEIGHT: 64 IN | SYSTOLIC BLOOD PRESSURE: 130 MMHG | DIASTOLIC BLOOD PRESSURE: 82 MMHG | HEART RATE: 83 BPM

## 2023-08-23 DIAGNOSIS — I50.32 CHRONIC DIASTOLIC (CONGESTIVE) HEART FAILURE: ICD-10-CM

## 2023-08-23 DIAGNOSIS — I27.20 PULMONARY HYPERTENSION: ICD-10-CM

## 2023-08-23 DIAGNOSIS — M79.89 LEG SWELLING: ICD-10-CM

## 2023-08-23 DIAGNOSIS — I48.21 PERMANENT ATRIAL FIBRILLATION: ICD-10-CM

## 2023-08-23 DIAGNOSIS — Z95.2 S/P TAVR (TRANSCATHETER AORTIC VALVE REPLACEMENT): Primary | ICD-10-CM

## 2023-08-23 LAB
ALBUMIN SERPL-MCNC: 4.2 G/DL (ref 3.5–5.2)
ALBUMIN/GLOB SERPL: 1.8 G/DL
ALP SERPL-CCNC: 65 U/L (ref 39–117)
ALT SERPL W P-5'-P-CCNC: 38 U/L (ref 1–33)
ANION GAP SERPL CALCULATED.3IONS-SCNC: 11 MMOL/L (ref 5–15)
AST SERPL-CCNC: 46 U/L (ref 1–32)
BILIRUB SERPL-MCNC: 0.7 MG/DL (ref 0–1.2)
BUN SERPL-MCNC: 15 MG/DL (ref 8–23)
BUN/CREAT SERPL: 18.1 (ref 7–25)
CALCIUM SPEC-SCNC: 9.6 MG/DL (ref 8.6–10.5)
CHLORIDE SERPL-SCNC: 104 MMOL/L (ref 98–107)
CO2 SERPL-SCNC: 24 MMOL/L (ref 22–29)
CREAT SERPL-MCNC: 0.83 MG/DL (ref 0.57–1)
EGFRCR SERPLBLD CKD-EPI 2021: 68.8 ML/MIN/1.73
GLOBULIN UR ELPH-MCNC: 2.3 GM/DL
GLUCOSE SERPL-MCNC: 99 MG/DL (ref 65–99)
NT-PROBNP SERPL-MCNC: 1131 PG/ML (ref 0–1800)
POTASSIUM SERPL-SCNC: 4.4 MMOL/L (ref 3.5–5.2)
PROT SERPL-MCNC: 6.5 G/DL (ref 6–8.5)
SODIUM SERPL-SCNC: 139 MMOL/L (ref 136–145)

## 2023-08-23 PROCEDURE — 1159F MED LIST DOCD IN RCRD: CPT | Performed by: INTERNAL MEDICINE

## 2023-08-23 PROCEDURE — 83880 ASSAY OF NATRIURETIC PEPTIDE: CPT

## 2023-08-23 PROCEDURE — 1160F RVW MEDS BY RX/DR IN RCRD: CPT | Performed by: INTERNAL MEDICINE

## 2023-08-23 PROCEDURE — 80053 COMPREHEN METABOLIC PANEL: CPT

## 2023-08-23 PROCEDURE — 93000 ELECTROCARDIOGRAM COMPLETE: CPT | Performed by: INTERNAL MEDICINE

## 2023-08-23 PROCEDURE — 99214 OFFICE O/P EST MOD 30 MIN: CPT | Performed by: INTERNAL MEDICINE

## 2023-08-23 PROCEDURE — 36415 COLL VENOUS BLD VENIPUNCTURE: CPT

## 2023-08-23 RX ORDER — SPIRONOLACTONE 25 MG/1
25 TABLET ORAL DAILY
Qty: 90 TABLET | Refills: 3 | Status: SHIPPED | OUTPATIENT
Start: 2023-08-23

## 2023-08-23 NOTE — PROGRESS NOTES
[facility] Follow Up Office Note     Encounter Date:23  Patient:Tequila Vargas  :1935  MRN:[id number]      Chief Complaint: Follow up Atrial fibrillation and TAVR  Chief Complaint   Patient presents with    Atrial Fibrillation     6 month f/u       History of Presenting Illness:    Ms. Vargsa is a 88 y.o. woman with past medical history notable for permanent atrial fibrillation and aortic stenosis status post TAVR with 23 mm Wes 3 aortic valve bioprosthesis on 10/30/2018 who presents today for scheduled follow up.  I last saw her a year after her TAVR at that time she is doing well.  Over the last 6 months or so she has been having more shortness of breath and leg swelling.  An echocardiogram was performed about 3 months ago showed normal valve function does show some diastolic dysfunction.  She did have a slight increase in her diuretics for a few days which really did not help out much.  She is concerned that her left leg is a little bit more swollen than her right which is unusual since her right leg was the injured leg left leg usually is not as swollen.    Review of Systems:  Review of Systems   Constitutional: Positive for malaise/fatigue.   HENT: Negative.     Eyes: Negative.    Cardiovascular:  Positive for leg swelling.   Respiratory: Negative.     Endocrine: Negative.    Hematologic/Lymphatic: Negative.    Skin: Negative.    Musculoskeletal: Negative.    Gastrointestinal: Negative.    Genitourinary: Negative.    Neurological:  Positive for dizziness.   Psychiatric/Behavioral: Negative.     Allergic/Immunologic: Negative.      Current Outpatient Medications on File Prior to Visit   Medication Sig Dispense Refill    apixaban (Eliquis) 2.5 MG tablet tablet Take 1 tablet by mouth Every 12 (Twelve) Hours. 180 tablet 3    aspirin 81 MG EC tablet Take 1 tablet by mouth Daily. 90 tablet 3    digoxin (LANOXIN) 250 MCG tablet Take 1 tablet by mouth Daily. 90 tablet 3    furosemide  (LASIX) 20 MG tablet Take 1 tablet by mouth Daily. 180 tablet 3    metoprolol tartrate (LOPRESSOR) 25 MG tablet Take 1 tablet by mouth 2 (Two) Times a Day. 180 tablet 3    vitamin D3 125 MCG (5000 UT) capsule capsule Take 1 capsule by mouth Daily.      amoxicillin (AMOXIL) 500 MG capsule Take 4 capsules by mouth See Admin Instructions. 4 capsules 1 hour prior to procedure (Patient not taking: Reported on 8/23/2023) 4 capsule 2     Current Facility-Administered Medications on File Prior to Visit   Medication Dose Route Frequency Provider Last Rate Last Admin    nitroglycerin (NITROSTAT) SL tablet 0.4 mg  0.4 mg Sublingual Q5 Min PRN Agnes Qureshi MD        sodium chloride 0.9 % flush 10 mL  10 mL Intravenous PRN Agnes Qureshi MD           No Known Allergies    Past Medical History:   Diagnosis Date    Aortic stenosis     Status post TAVR 10/30/2018 (23 mm Wes 3 valve)    Atrial fibrillation     Chronic back pain     Colon polyps     Osteoporosis     Rheumatic fever     As Child    S/P TAVR (transcatheter aortic valve replacement)     Seasonal allergies     Spinal stenosis     Squamous cell carcinoma     Nose       Past Surgical History:   Procedure Laterality Date    AORTIC VALVE REPAIR/REPLACEMENT N/A 10/30/2018    Procedure: TRANSFEMORAL TRANSCATHETER AORTIC VALVE REPLACEMENT WITH POSSIBLE BAIL OUT OPEN HEART CARDIAC SURGERY with TTE;  Surgeon: Black Hagen MD;  Location: [facility] OR 18/19;  Service: Cardiothoracic    APPENDECTOMY      CARDIAC CATHETERIZATION Left 10/21/2018    Procedure: Cardiac Catheterization/Vascular Study;  Surgeon: Kam Sosa MD;  Location: [facility] CATH INVASIVE LOCATION;  Service: Cardiology    CARDIAC CATHETERIZATION N/A 10/21/2018    Procedure: Left Heart Cath;  Surgeon: Kam Sosa MD;  Location: [facility] CATH INVASIVE LOCATION;  Service: Cardiology    CARDIAC CATHETERIZATION N/A 10/21/2018    Procedure: Coronary angiography;  Surgeon: Kam Sosa MD;   "Location:  [facility] CATH INVASIVE LOCATION;  Service: Cardiology    CARDIAC CATHETERIZATION N/A 10/21/2018    Procedure: Right Heart Cath;  Surgeon: Kam Sosa MD;  [facility]AS[facility];  Service: Cardiology    COLONOSCOPY      ELBOW ARTHROPLASTY Right     EXCISION MASS HEAD/NECK N/A 11/28/2017    Procedure: EXCISION ACTINIC KERATOSIS NASAL TIP;  Surgeon: Maurine Waterhouse, MD;  Location:  [facility] OR [facility];  Service:     HAND SURGERY Right     excision of tendon cyst; ganglion cyst removal    HYSTERECTOMY      s/p partial hysterectomy (one ovary intact - although she does not know which one).    MYRINGOTOMY Left     WITH TUBE PLACEMENT. SINCE REMOVED    TOTAL KNEE ARTHROPLASTY Right     TOTAL KNEE ARTHROPLASTY REVISION Right        Social History     Socioeconomic History    Marital status:    Tobacco Use    Smoking status: Never    Smokeless tobacco: Never   Vaping Use    Vaping Use: Never used   Substance and Sexual Activity    Alcohol use: No     Comment: no caffeine    Drug use: No    Sexual activity: Yes     Partners: Male       Family History   Problem Relation Age of Onset    Colon cancer Father     Heart failure Father     Heart disease Sister         s/p valve replacement (valve unknown)    Colon cancer Brother     Malig Hyperthermia Neg Hx        The following portions of the patient's history were reviewed and updated as appropriate: allergies, current medications, past family history, past medical history, past social history, past surgical history and problem list.       Objective:       Vitals:    08/23/23 1002   BP: 130/82   BP Location: Left arm   Patient Position: Sitting   Pulse: 83   Weight: 56.1 kg (123 lb 9.6 oz)   Height: 162.6 cm (64\")     Body mass index is 21.22 kg/mý.     Physical Exam:  Constitutional: Well appearing, Well-developed, No acute distress   HENT: Oropharynx clear and membrane moist  Eyes: Normal conjunctiva, no sclera icterus.  Neck: Supple, no carotid " bruit bilaterally.  Cardiovascular: Irregularly irregular rate and rhythm, Early peaking systolic murmur over the right upper sternal border, 1+ bilateral lower extremity edema.  Pulmonary: Normal respiratory effort, normal lung sounds, no wheezing.  Neurological: Alert and orient x 3.   Skin: Warm, dry, no ecchymosis, no rash.  Psych: Appropriate mood and affect. Normal judgment and insight.      Lab Results   Component Value Date    GLUCOSE 107 (H) 10/23/2019    BUN 12 10/23/2019    CREATININE 0.88 10/23/2019    EGFRIFNONA 62 10/23/2019    BCR 13.6 10/23/2019    K 4.3 10/23/2019    CO2 24.3 10/23/2019    CALCIUM 9.1 10/23/2019    ALBUMIN 4.30 01/02/2019    AST 19 01/02/2019    ALT 14 01/02/2019       Lab Results   Component Value Date    WBC 7.20 11/30/2021    HGB 12.3 11/30/2021    HCT 39.8 11/30/2021    MCV 95.2 11/30/2021     11/30/2021       Lab Results   Component Value Date    TROPONINT <0.010 10/07/2021       Lab Results   Component Value Date    TSH 2.070 11/30/2021         ECG 12 Lead    Date/Time: 8/23/2023 10:24 AM  Performed by: Agapito Menendez MD  Authorized by: Agapito Menendez MD   Comparison: compared with previous ECG from 2/13/2023  Similar to previous ECG  Rhythm: atrial fibrillation        Echocardiogram 3/27/2023 with images reviewed by myself:  Left ventricular systolic function is normal. Calculated left ventricular EF = 54.6%  Left ventricular diastolic function was indeterminate.  Left atrial volume is moderately increased.  The right atrial cavity is mild to moderately  dilated.  Moderate tricuspid valve regurgitation is present.  Estimated right ventricular systolic pressure from tricuspid regurgitation is mildly elevated (35-45 mmHg). Calculated right ventricular systolic pressure from tricuspid regurgitation is 38 mmHg.  Mild pulmonary hypertension is present.  Trace aortic valve regurgitation is present. Aortic valve area is 0.73 cm2. The peak aortic velocity was measured  in the right parasternal view. Peak velocity of the flow distal to the aortic valve is 254.5 cm/s. Aortic valve maximum pressure gradient is 25.9 mmHg. Aortic valve mean pressure gradient is 11.1 mmHg. Aortic valve dimensionless index is 0.30 . There is a 23 mm TAVR valve present. The aortic valve peak and mean gradients are within defined limits. The prosthetic aortic valve is normal. There may be slight thickening of the aortic valve leaflets. C/w prior study 2021, there is no change    Echocardiogram 10/23/2019:  Normal left ventricular systolic function  There is a well-seated 23 mm Wes 3 aortic bioprostheses which is functioning normally.  There is no perivalvular or valvular regurgitation.  Aortic valve area calculates to 1.7 cm2 with a dimensionless index of 0.6 and a mean gradient of 11 mm across the aortic valve.  No significant change when compared to her prior echocardiogram    TAVR 10/30/2018:  23 mm WES S3 placed via transfemoral approach    Cardiac catheterization 10/21/2018:  The left main is normal.   The proximal LAD has 10% stenosis. The mid LAD has 10% stenosis.   The circumflex has 30% proximal stenosis.   The RCA is normal.        Assessment:          Diagnosis Plan   1. S/P TAVR (transcatheter aortic valve replacement)        2. Permanent atrial fibrillation  ECG 12 Lead      3. Chronic diastolic (congestive) heart failure  proBNP    Comprehensive Metabolic Panel      4. Pulmonary hypertension        5. Leg swelling  Duplex Venous Lower Extremity - Left CAR             Plan:     Ms. Varags is a 88 y.o. woman with past medical history notable for permanent atrial fibrillation and aortic stenosis status post TAVR with 23 mm Wes 3 aortic valve bioprosthesis on 10/30/2018 who presents today for scheduled follow-up.  She is having more lower extremity swelling this could be related to pulmonary hypertension or diastolic dysfunction can add a low-dose spironolactone to see how this works  with regards to keeping some fluid off.  Low likelihood but given that she has asymmetric leg swelling I will check a lower extremity duplex of the left leg make sure she is does not have any clot forming she is on reduced dose Eliquis and may need to adjust if clot is found.  Like to also follow-up on repeat labs with a repeat CMP and proBNP level to help guide further therapy.    Nonrheumatic aortic valve stenosis status post TAVR:  Status post 23 mm becky 3 aortic valve bioprosthesis 10/30/2018  Normal functioning valve on echocardiogram 3/2023    Permanent Atrial Fibrillation:  Rate controlled  Continue anticoagulation  [id number] score 4     Chronic diastolic congestive heart failure/pulmonary hypertension:  Adding spironolactone today  Follow-up CMP and proBNP level  Continue low-dose diuretic and can adjust further based upon response to spironolactone and labs    Advance Care Planning   ACP discussion was held with the patient during this visit. Patient does not have an advance directive, information provided.       Follow-up:  3-months      Agapito Menendez MD  [facility]  08/23/23  10:56 EDT

## 2023-08-23 NOTE — PATIENT INSTRUCTIONS
Will check labs today  Will check leg doppler to rule our clot  Will start spirolactone 25 mg daily

## 2023-09-14 ENCOUNTER — HOSPITAL ENCOUNTER (OUTPATIENT)
Dept: CARDIOLOGY | Facility: HOSPITAL | Age: 88
Discharge: HOME | End: 2023-09-14
Admitting: INTERNAL MEDICINE
Payer: MEDICARE

## 2023-09-14 ENCOUNTER — TELEPHONE (OUTPATIENT)
Dept: CARDIOLOGY | Facility: CLINIC | Age: 88
End: 2023-09-14
Payer: MEDICARE

## 2023-09-14 DIAGNOSIS — M79.89 LEG SWELLING: ICD-10-CM

## 2023-09-14 LAB
BH CV LOWER VASCULAR LEFT COMMON FEMORAL AUGMENT: NORMAL
BH CV LOWER VASCULAR LEFT COMMON FEMORAL COMPETENT: NORMAL
BH CV LOWER VASCULAR LEFT COMMON FEMORAL COMPRESS: NORMAL
BH CV LOWER VASCULAR LEFT COMMON FEMORAL PHASIC: NORMAL
BH CV LOWER VASCULAR LEFT COMMON FEMORAL SPONT: NORMAL
BH CV LOWER VASCULAR LEFT DISTAL FEMORAL COMPRESS: NORMAL
BH CV LOWER VASCULAR LEFT GASTRONEMIUS COMPRESS: NORMAL
BH CV LOWER VASCULAR LEFT GREATER SAPH AK COMPRESS: NORMAL
BH CV LOWER VASCULAR LEFT GREATER SAPH BK COMPRESS: NORMAL
BH CV LOWER VASCULAR LEFT LESSER SAPH COMPRESS: NORMAL
BH CV LOWER VASCULAR LEFT MID FEMORAL AUGMENT: NORMAL
BH CV LOWER VASCULAR LEFT MID FEMORAL COMPETENT: NORMAL
BH CV LOWER VASCULAR LEFT MID FEMORAL COMPRESS: NORMAL
BH CV LOWER VASCULAR LEFT MID FEMORAL PHASIC: NORMAL
BH CV LOWER VASCULAR LEFT MID FEMORAL SPONT: NORMAL
BH CV LOWER VASCULAR LEFT PERONEAL COMPRESS: NORMAL
BH CV LOWER VASCULAR LEFT POPLITEAL AUGMENT: NORMAL
BH CV LOWER VASCULAR LEFT POPLITEAL COMPETENT: NORMAL
BH CV LOWER VASCULAR LEFT POPLITEAL COMPRESS: NORMAL
BH CV LOWER VASCULAR LEFT POPLITEAL PHASIC: NORMAL
BH CV LOWER VASCULAR LEFT POPLITEAL SPONT: NORMAL
BH CV LOWER VASCULAR LEFT POSTERIOR TIBIAL COMPRESS: NORMAL
BH CV LOWER VASCULAR LEFT PROFUNDA FEMORAL COMPRESS: NORMAL
BH CV LOWER VASCULAR LEFT PROXIMAL FEMORAL COMPRESS: NORMAL
BH CV LOWER VASCULAR LEFT SAPHENOFEMORAL JUNCTION COMPRESS: NORMAL
BH CV LOWER VASCULAR RIGHT COMMON FEMORAL AUGMENT: NORMAL
BH CV LOWER VASCULAR RIGHT COMMON FEMORAL COMPETENT: NORMAL
BH CV LOWER VASCULAR RIGHT COMMON FEMORAL COMPRESS: NORMAL
BH CV LOWER VASCULAR RIGHT COMMON FEMORAL PHASIC: NORMAL
BH CV LOWER VASCULAR RIGHT COMMON FEMORAL SPONT: NORMAL

## 2023-09-14 PROCEDURE — 93971 EXTREMITY STUDY: CPT

## 2023-09-14 NOTE — TELEPHONE ENCOUNTER
Called and left voicemail regarding normal venous duplex results.  Continue with current therapy we will see back as scheduled she will call us if she has any questions

## 2024-01-10 ENCOUNTER — TELEPHONE (OUTPATIENT)
Dept: CARDIOLOGY | Facility: CLINIC | Age: 89
End: 2024-01-10
Payer: MEDICARE

## 2024-01-10 NOTE — TELEPHONE ENCOUNTER
"Patient called stating that her PCP took her off of Eliquis. She has not been taking it for 2-3 weeks. She spelled out what was given to her \"Omeprazole DR 40 mg\". I explain to her that was not a blood thinner.     Then she proceeds to tell me she was given another medication, Xarelto 20 mg. Per patient, Her insurance does not want to pay for the Xarelto, because of her heart valve.    She still has Eliquis at home to take, but she would like to speak with you first.     She can be reached at [phone]      "

## 2024-01-11 NOTE — TELEPHONE ENCOUNTER
Called and talked with patient.  Looks like she had a superficial left leg clot but no DVT in early Dec.  She was changed from Eliquis 2.5 mg to Xarelto 20 mg but now is out of Xarelto and insurance now won't pay for Xalerto for some reason.  She does have Eliquis.  She will restart Elquis and we will get her into the office to evaluate her L leg which is again more swollen than right and noted on exam in Aug.  May need further evaluation and Lymphedema eval.  Also might be appropiate to increase to 5 mg of Eliquis had been on low dosing due to age and low body weight.    She is out of network with us now and she was just trying to wait until it was resolved.  I told her I am not sure when or even if this will be resolved.  For most patients their out of network cost are the same but she needs to check with [employer].  If not willing to go out of network we need to find her a new Cardiologist as we cannot manage her via telephone calls.  Her  is having surgery next week we can get her in the office in two weeks with Genny or myself.

## 2024-02-20 RX ORDER — APIXABAN 2.5 MG/1
2.5 TABLET, FILM COATED ORAL EVERY 12 HOURS
Qty: 60 TABLET | Refills: 1 | Status: SHIPPED | OUTPATIENT
Start: 2024-02-20

## 2024-02-21 RX ORDER — DIGOXIN 250 MCG
250 TABLET ORAL DAILY
Qty: 90 TABLET | Refills: 3 | Status: SHIPPED | OUTPATIENT
Start: 2024-02-21

## 2025-03-10 ENCOUNTER — TELEPHONE (OUTPATIENT)
Dept: CARDIOLOGY | Age: 89
End: 2025-03-10

## 2025-03-20 ENCOUNTER — TELEPHONE (OUTPATIENT)
Age: 89
End: 2025-03-20
Payer: MEDICARE

## 2025-03-20 NOTE — TELEPHONE ENCOUNTER
Caller: AASHISH-[facility]    Relationship to patient:     Best call back number: [phone]    Patient is needing: AASHISH FROM [facility] CALLED TO CONFIRM IF A CARDIAC CLEARANCE FORM WAS RECEIVED. FORM WAS FAXED OVER ON 03.07.2025. AASHISH REFAXING FORM. PLEASE CALL HER BACK TO LET HER KNOW WHEN FAX IS RECEIVED AND FAXED BACK

## 2025-03-20 NOTE — TELEPHONE ENCOUNTER
Patient has not been seen since 2023. Last time we talked she was out of network. Tried calling patient twice, no answer. Left .

## 2025-03-20 NOTE — TELEPHONE ENCOUNTER
Cardiac clearance has been received from [facility] , Dr. Hamilton office, requesting clearance for mutual patient to have a EGD w/ dilation done TBD. Does pt need to hold any medications prior to being put under anesthesia?    Return clearance to Amaya at [phone]

## 2025-03-21 NOTE — TELEPHONE ENCOUNTER
She would need to be seen by me for clearance prior to any sort of procedure she does have cardiac issues but again if she has not been seen since 2023 unable to provide clearance.  If out of network would need to find a provider that is within network

## 2025-03-21 NOTE — TELEPHONE ENCOUNTER
Called and spoke with Amaya. I will fax our last office note, which was in 2023. Also, will be faxing this telephone encounter  as well. She informed me that patient is now in the hospital at [facility].

## (undated) DEVICE — GAUZE,SPONGE,4"X4",16PLY,XRAY,STRL,LF: Brand: MEDLINE

## (undated) DEVICE — SUT SILK 0 CT1 CR8 18IN C021D

## (undated) DEVICE — RADIFOCUS GLIDEWIRE: Brand: GLIDEWIRE

## (undated) DEVICE — PK PROC MAJ 40

## (undated) DEVICE — INTRO SHEATH ART/FEM ENGAGE .035 5F12CM

## (undated) DEVICE — 3M™ BAIR HUGGER® UNDERBODY BLANKET, FULL ACCESS, 10 PER CASE 63500: Brand: BAIR HUGGER™

## (undated) DEVICE — GLIDESHEATH BASIC HYDROPHILIC COATED INTRODUCER SHEATH: Brand: GLIDESHEATH

## (undated) DEVICE — APPL CHLORAPREP W/TINT 26ML ORNG

## (undated) DEVICE — CATH DIAG IMPULSE AL1 6F 100CM

## (undated) DEVICE — SKIN PREP TRAY W/CHG: Brand: MEDLINE INDUSTRIES, INC.

## (undated) DEVICE — GLV SURG BIOGEL LTX PF 6 1/2

## (undated) DEVICE — TBG PRESS HI FLX BR 96IN

## (undated) DEVICE — PK ENT 40

## (undated) DEVICE — CATH ELECTRD PACE TEMP BI NONHEP 5F110CM

## (undated) DEVICE — GLV SURG BIOGEL LTX PF 8

## (undated) DEVICE — Device

## (undated) DEVICE — DECANT BG O JET

## (undated) DEVICE — CARDIOVASCULAR SPLIT DRAPE II, OPTIMA: Brand: CONVERTORS

## (undated) DEVICE — SPNG GZ WOVN 4X4IN 12PLY 10/BX STRL

## (undated) DEVICE — HI-TORQUE SUPRA CORE .035 PERIPHERAL GUIDE WIRE .035 X 190 CM: Brand: HI-TORQUE SUPRA CORE

## (undated) DEVICE — TBG INJ CONTRL PVCCLR RIGD RA 1200PSI 10

## (undated) DEVICE — TRY INTRO PERC 6F

## (undated) DEVICE — KT MANIFLD CARDIAC

## (undated) DEVICE — SOL NACL 0.9PCT 1000ML

## (undated) DEVICE — ANGIO-SEAL VIP VASCULAR CLOSURE DEVICE: Brand: ANGIO-SEAL

## (undated) DEVICE — TRY SKINPREP DRYPREP

## (undated) DEVICE — MARKR SKIN W/RULR ULTRAFINETP

## (undated) DEVICE — PK CATH CARD 40

## (undated) DEVICE — FR5 INFINITI MULTIPAC: Brand: INFINITI

## (undated) DEVICE — GW EMR FIX EXCHG J STD .035 3MM 260CM

## (undated) DEVICE — 3M™ IOBAN™ 2 ANTIMICROBIAL INCISE DRAPE 6651EZ: Brand: IOBAN™ 2

## (undated) DEVICE — SPK CONTRST MISER

## (undated) DEVICE — CVR PROB 96IN LF STRL

## (undated) DEVICE — GOWN ,SIRUS,NONREINFORCED 3XL: Brand: MEDLINE

## (undated) DEVICE — PERCLOSE PROGLIDE™ SUTURE-MEDIATED CLOSURE SYSTEM: Brand: PERCLOSE PROGLIDE™

## (undated) DEVICE — SUT SILK 2 SUTUPAK TIE 60IN SA8H 2STRAND

## (undated) DEVICE — INTRO SHEATH ART/FEM ENGAGE .035 6F12CM

## (undated) DEVICE — DRSNG SURESITE WNDW 2.38X2.75

## (undated) DEVICE — GW XCHG AMPLTZ XSTIF PTFE CRV .035 3X260

## (undated) DEVICE — COVER,TABLE,HEAVY DUTY,79"X110",STRL: Brand: MEDLINE

## (undated) DEVICE — CATH DIAG IMPULSE FR4 6F 100CM

## (undated) DEVICE — SUT VIC 5/0 P3 18IN J493G

## (undated) DEVICE — TRANSD PRESS STOPCOCK1WAY CABL48IN

## (undated) DEVICE — TOWEL,OR,DSP,ST,BLUE,STD,4/PK,20PK/CS: Brand: MEDLINE

## (undated) DEVICE — INTRO SHEATH ART/FEM ENGAGE .035 8F12CM

## (undated) DEVICE — SUTURE REMOVAL TRAY: Brand: MEDLINE INDUSTRIES, INC.

## (undated) DEVICE — 3M™ IOBAN™ 2 ANTIMICROBIAL INCISE DRAPE 6650EZ: Brand: IOBAN™ 2

## (undated) DEVICE — CVR TABL BACK STND

## (undated) DEVICE — PK ATS CUST W CARDIOTOMY RESEVOIR

## (undated) DEVICE — CATH DIAG IMPULSE FR4 5F 100CM

## (undated) DEVICE — MARKR SKIN W/RULR AND LBL

## (undated) DEVICE — BALN PRESS WEDGE 5F 110CM

## (undated) DEVICE — SENSR CERBRL O2 PK/2

## (undated) DEVICE — TBG ART PRESS 24 IN

## (undated) DEVICE — STPCK 1WY STD/PRESS SWIVELNUT

## (undated) DEVICE — DRAPE,REIN 53X77,STERILE: Brand: MEDLINE

## (undated) DEVICE — CATH DIAG IMPULSE FL3.5 5F 100CM

## (undated) DEVICE — STANDARD HYPODERMIC NEEDLE,POLYPROPYLENE HUB: Brand: MONOJECT

## (undated) DEVICE — ZINC CALCIUM ALGINATE DRESSING: Brand: KENDALL

## (undated) DEVICE — EACH LANGSTON DUAL LUMEN CATHETER IS INDICATED FOR DELIVERY OF CONTRAST MEDIUM IN ANGIOGRAPHIC STUDIES AND FOR SIMULTANEOUS PRESSURE MEASUREMENT FROM TWO SITES. THIS TYPE OF PRESSURE MEASUREMENT IS USEFUL IN DETERMINING TRANSVALVULAR, INTRAVASCULAR AND INTRAVENTRICULAR PRESSURE GRADIENTS.: Brand: LANGSTON® DUAL LUMEN CATHETER

## (undated) DEVICE — COVER,MAYO STAND,STERILE: Brand: MEDLINE

## (undated) DEVICE — PK PERFUS CUST W/CARDIOPLEGIA

## (undated) DEVICE — SYR LUERLOK 50ML

## (undated) DEVICE — ELECTRD NDL TUNG/MICRO STR 2CM

## (undated) DEVICE — DRSNG SURESITE123 2.4X2.8IN

## (undated) DEVICE — HI-TORQUE SUPRA CORE .035 PERIPHERAL GUIDE WIRE .035 X 300 CM: Brand: HI-TORQUE SUPRA CORE

## (undated) DEVICE — SUT ETHLN 6/0 P1 18IN 697G

## (undated) DEVICE — SOL ISO/ALC RUB 70PCT 4OZ

## (undated) DEVICE — HEMOCONCENTRATOR PERFUS LPS06

## (undated) DEVICE — TBG ART PRESS 60 IN

## (undated) DEVICE — BIOPATCH™ ANTIMICROBIAL DRESSING WITH CHLORHEXIDINE GLUCONATE IS A HYDROPHILLIC POLYURETHANE ABSORPTIVE FOAM WITH CHLORHEXIDINE GLUCONATE (CHG) WHICH INHIBITS BACTERIAL GROWTH UNDER THE DRESSING. THE DRESSING IS INTENDED TO BE USED TO ABSORB EXUDATE, COVER A WOUND CAUSED BY VASCULAR AND NONVASCULAR PERCUTANEOUS MEDICAL DEVICES DURING SURGERY, AS WELL AS REDUCE LOCAL INFECTION AND COLONIZATION OF MICROORGANISMS.: Brand: BIOPATCH

## (undated) DEVICE — CVR PROB GEN PURP W ISOSILK 6X48

## (undated) DEVICE — GW INQWIRE FC PTFE STR .035IN 150

## (undated) DEVICE — SOL IRR H2O BTL 1000ML STRL

## (undated) DEVICE — SYR LL TP 10ML STRL

## (undated) DEVICE — INTENDED FOR TISSUE SEPARATION, AND OTHER PROCEDURES THAT REQUIRE A SHARP SURGICAL BLADE TO PUNCTURE OR CUT.: Brand: BARD-PARKER ® CARBON RIB-BACK BLADES